# Patient Record
Sex: MALE | Race: ASIAN | Employment: OTHER | URBAN - METROPOLITAN AREA
[De-identification: names, ages, dates, MRNs, and addresses within clinical notes are randomized per-mention and may not be internally consistent; named-entity substitution may affect disease eponyms.]

---

## 2017-06-02 ENCOUNTER — OP HISTORICAL/CONVERTED ENCOUNTER (OUTPATIENT)
Dept: OTHER | Age: 52
End: 2017-06-02

## 2018-01-15 ENCOUNTER — OP HISTORICAL/CONVERTED ENCOUNTER (OUTPATIENT)
Dept: OTHER | Age: 53
End: 2018-01-15

## 2018-02-09 ENCOUNTER — OP HISTORICAL/CONVERTED ENCOUNTER (OUTPATIENT)
Dept: OTHER | Age: 53
End: 2018-02-09

## 2019-02-06 LAB — MICROALBUMIN UR TEST STR-MCNC: NORMAL MG/DL

## 2019-08-21 LAB — LDL-C, EXTERNAL: 104

## 2020-01-30 LAB
CREATININE, EXTERNAL: 0.98
HBA1C MFR BLD HPLC: 5.1 %

## 2020-02-10 ENCOUNTER — OP HISTORICAL/CONVERTED ENCOUNTER (OUTPATIENT)
Dept: OTHER | Age: 55
End: 2020-02-10

## 2020-05-27 ENCOUNTER — OFFICE VISIT (OUTPATIENT)
Dept: NEUROLOGY | Age: 55
End: 2020-05-27

## 2020-05-27 VITALS — TEMPERATURE: 99 F

## 2020-07-22 ENCOUNTER — CLINICAL SUPPORT (OUTPATIENT)
Dept: FAMILY MEDICINE CLINIC | Age: 55
End: 2020-07-22
Payer: MEDICARE

## 2020-07-22 ENCOUNTER — TELEPHONE (OUTPATIENT)
Dept: FAMILY MEDICINE CLINIC | Age: 55
End: 2020-07-22

## 2020-07-22 VITALS
WEIGHT: 147 LBS | DIASTOLIC BLOOD PRESSURE: 74 MMHG | OXYGEN SATURATION: 95 % | SYSTOLIC BLOOD PRESSURE: 121 MMHG | TEMPERATURE: 99.6 F | HEART RATE: 79 BPM

## 2020-07-22 DIAGNOSIS — F20.9 SCHIZOPHRENIA, UNSPECIFIED TYPE (HCC): Primary | ICD-10-CM

## 2020-07-22 PROCEDURE — 96372 THER/PROPH/DIAG INJ SC/IM: CPT | Performed by: FAMILY MEDICINE

## 2020-07-22 RX ORDER — HALOPERIDOL DECANOATE 100 MG/ML
100 INJECTION INTRAMUSCULAR
Qty: 1 ML | Refills: 0 | Status: SHIPPED | COMMUNITY
Start: 2020-07-22 | End: 2021-03-31 | Stop reason: SDUPTHER

## 2020-07-31 VITALS
WEIGHT: 149 LBS | TEMPERATURE: 98.9 F | BODY MASS INDEX: 24.83 KG/M2 | RESPIRATION RATE: 16 BRPM | OXYGEN SATURATION: 96 % | SYSTOLIC BLOOD PRESSURE: 117 MMHG | HEIGHT: 65 IN | HEART RATE: 85 BPM | DIASTOLIC BLOOD PRESSURE: 75 MMHG

## 2020-07-31 PROBLEM — R25.1 TREMOR: Status: ACTIVE | Noted: 2020-07-31

## 2020-07-31 PROBLEM — R41.3 MEMORY IMPAIRMENT: Status: ACTIVE | Noted: 2020-07-31

## 2020-07-31 PROBLEM — E11.9 TYPE 2 DIABETES MELLITUS WITHOUT COMPLICATION (HCC): Status: ACTIVE | Noted: 2020-07-31

## 2020-07-31 PROBLEM — F20.9 SCHIZOPHRENIA (HCC): Status: ACTIVE | Noted: 2020-07-31

## 2020-08-11 ENCOUNTER — OFFICE VISIT (OUTPATIENT)
Dept: NEUROLOGY | Age: 55
End: 2020-08-11
Payer: MEDICARE

## 2020-08-11 VITALS
HEART RATE: 72 BPM | DIASTOLIC BLOOD PRESSURE: 78 MMHG | WEIGHT: 145 LBS | SYSTOLIC BLOOD PRESSURE: 118 MMHG | BODY MASS INDEX: 22.76 KG/M2 | RESPIRATION RATE: 14 BRPM | TEMPERATURE: 98.2 F | HEIGHT: 67 IN | OXYGEN SATURATION: 98 %

## 2020-08-11 DIAGNOSIS — R25.9 ABNORMAL INVOLUNTARY MOVEMENT: Primary | ICD-10-CM

## 2020-08-11 DIAGNOSIS — R29.6 FALLS FREQUENTLY: ICD-10-CM

## 2020-08-11 DIAGNOSIS — R25.9 ABNORMAL INVOLUNTARY MOVEMENT: ICD-10-CM

## 2020-08-11 PROCEDURE — G8420 CALC BMI NORM PARAMETERS: HCPCS | Performed by: PSYCHIATRY & NEUROLOGY

## 2020-08-11 PROCEDURE — 95816 EEG AWAKE AND DROWSY: CPT | Performed by: PSYCHIATRY & NEUROLOGY

## 2020-08-11 PROCEDURE — 3017F COLORECTAL CA SCREEN DOC REV: CPT | Performed by: PSYCHIATRY & NEUROLOGY

## 2020-08-11 PROCEDURE — 99204 OFFICE O/P NEW MOD 45 MIN: CPT | Performed by: PSYCHIATRY & NEUROLOGY

## 2020-08-11 PROCEDURE — G8427 DOCREV CUR MEDS BY ELIG CLIN: HCPCS | Performed by: PSYCHIATRY & NEUROLOGY

## 2020-08-11 PROCEDURE — G8510 SCR DEP NEG, NO PLAN REQD: HCPCS | Performed by: PSYCHIATRY & NEUROLOGY

## 2020-08-11 RX ORDER — HALOPERIDOL 5 MG/1
1 TABLET ORAL DAILY
COMMUNITY
Start: 2020-05-27 | End: 2021-07-13 | Stop reason: ALTCHOICE

## 2020-08-11 RX ORDER — LITHIUM CARBONATE 300 MG
1 TABLET ORAL 2 TIMES DAILY
COMMUNITY
Start: 2020-05-27 | End: 2021-07-13 | Stop reason: SDUPTHER

## 2020-08-11 RX ORDER — BUPROPION HYDROCHLORIDE 150 MG/1
1 TABLET ORAL DAILY
COMMUNITY
Start: 2020-05-27 | End: 2021-09-10

## 2020-08-11 RX ORDER — TEMAZEPAM 30 MG/1
1 CAPSULE ORAL
COMMUNITY
Start: 2020-07-24 | End: 2021-09-10

## 2020-08-11 RX ORDER — BENZTROPINE MESYLATE 1 MG/1
1 TABLET ORAL 2 TIMES DAILY
Status: ON HOLD | COMMUNITY
Start: 2020-05-21 | End: 2021-09-10 | Stop reason: SDUPTHER

## 2020-08-11 NOTE — PROGRESS NOTES
Chief Complaint   Patient presents with    New Patient     abnl foot movement and body seems to lean to left. particularly noticable when walking     Fall     at least 1-2 per mo, no injuries     Brother here with pt today giving medical hx.

## 2020-08-11 NOTE — PROGRESS NOTES
[facility] and [address] at [facility] at [facility]1 [address], [address] [facility] [address] [facility] [address]  [phone] Office  [phone]           Referring: Johnnie Emerson DO      Chief Complaint   Patient presents with   Noreene Peat New Patient     abnl foot movement and body seems to lean to left. particularly noticable when walking     Fall     at least 1-2 per mo, no injuries     61-year-old right-handed gentleman who is accompanied by his brother today and his brother provides history for complaints of foot jerks and occasionally results in falling and his body seems to lean to the left. His brother says he noticed this  Sometime ago and is gotten a bit progressively worse. The patient will be walking and he will start having a jumping movement of his right foot and this will cause him to fall. He is fallen about 1-2 times per month. He also has started to lean to the left and that is gotten progressively worse. His brother thought maybe he was drinking too much orange juice or soda or it was sugar related so he started restricting the patient's diet in terms of the amount of sugar intake. That made no difference. He has not had any loss in consciousness. No recent illness. No fever. No chills. No complaints of chest pain or shortness of breath. Patient says that his brother was evaluated a couple years ago and was told there was no abnormality but it only sounds like he had an examination. It also sounds as though his symptoms have been progressive since that time. He has not had any change to his antipsychotic medications and psychiatric drugs and his brother notes he has been on the same medicine for about 30 years. He does not seem to drag a leg or an arm. He does not have his consciousness altered with these movements.   He had a head CT performed several years ago. He has not had any recent imaging. Review of the electronic medical records finds no documentation in our system was no imaging in our system. Past Medical History:   Diagnosis Date    Anxiety     Depression    He also carries psychiatric diagnoses of schizophrenia unknown type as well as bipolar    History reviewed. No pertinent surgical history. Current Outpatient Medications   Medication Sig Dispense Refill    haloperidol decanoate (HALDOL DECANOATE) 100 mg/mL injection 1 mL by IntraMUSCular route every twenty-eight (28) days. (Patient taking differently: 100 mg by IntraMUSCular route every fourteen (14) days. ) 1 mL 0    benztropine (COGENTIN) 1 mg tablet Take 1 mg by mouth two (2) times a day.  buPROPion XL (WELLBUTRIN XL) 150 mg tablet Take 1 Tab by mouth daily.  haloperidoL (HALDOL) 5 mg tablet Take 1 Tab by mouth daily.  lithium carbonate 300 mg tablet Take 1 Tab by mouth three (3) times daily.  temazepam (RESTORIL) 30 mg capsule Take 1 Cap by mouth nightly. He is also on lithium 300 mg 3 times daily    No Known Allergies    Social History     Tobacco Use    Smoking status: Former Smoker     Packs/day: 1.00     Last attempt to quit:      Years since quittin.6    Smokeless tobacco: Never Used   Substance Use Topics    Alcohol use: Not Currently    Drug use: Never       Family History   Problem Relation Age of Onset    Cancer Father         bone       Review of Systems  Pertinent positives and negatives as noted with remainder of comprehensive review negative      Examination  Visit Vitals  Temp 98.2 °F (36.8 °C)   Ht 5' 7\" (1.702 m)   Wt 65.8 kg (145 lb)   BMI 22.71 kg/m²     Pleasant, well appearing. Dress and grooming are appropriate. No scleral icterus is present. Oropharynx is clear. Supple neck without bruit appreciated. Heart regular. Pulses are symmetrical.  No edema in the lower extremities. He is awake and alert.   He has no spontaneous speech. He will answer some yes and no questions. He tells me the month is August.  He does not know the president. Follows commands in the for examination. He is hypophonic. He has intact cranial nerves II-XII. No nystagmus. He has no pronation or drift. He has a variable postural tremor but no rest tremor. He is with cogwheeling at the wrists bilaterally. Hand opening and closing is good. He resists fully in the upper and lower extremities in all muscle groups today testing. Reflexes are more brisk on the right than the left and he has nonsustained clonus at the right ankle. No ataxia. CT scan [facility] dated February 10, 2010 reviewed personally on disc is normal to my review    Impression/Plan  52-year-old gentleman with longstanding psychopathology with complaints of abnormal jerking movements in the lower extremity causing him to fall and what it actually sounds like is happening is if he steps he starts to have clonus and then that will throw him off balance. That at least is my interpretation from the story and getting from his brother. In addition he is leaning to the left and while he does attempt to straighten himself he continues to lean. He also has some parkinsonian features on his examination. The parkinsonian features are likely secondary to his neuroleptic use. This could cause some imbalance as well but certainly would not sway the reflexes. At this juncture we are going to start with an MRI of the brain Doppler EEG and an EMG of the lower extremities. If those are unrevealing I think we should start to investigate a spinal cord with a cervical and thoracic MRI. We will also let the EMG guide us in terms of anything peripheral but again he is hyperreflexic and this is going to be brain or cord. Return after his studies and if again they are unrevealing then he should have MRI of the cervical and thoracic spine.   We will also send CMP, lithium level, copper and ceruloplasmin. Essie Sal MD    This note was created using voice recognition software. Despite editing, there may be syntax errors. This note will not be viewable in [address]

## 2020-08-11 NOTE — LETTER
8/11/20 Patient: Crystal Jolley YOB: 1965 Date of Visit: 8/11/2020 Jai Malagon DO 
[address] [address] VIA In Basket Dear Jai Malagon DO, Thank you for referring Mr. Shirley Christopher to [facility] for evaluation. My notes for this consultation are attached. If you have questions, please do not hesitate to call me. I look forward to following your patient along with you. Sincerely, Lucas Mccormack MD

## 2020-08-11 NOTE — PATIENT INSTRUCTIONS
RESULT POLICY      If we have ordered testing for you, know that; \"NO NEWS IS GOOD NEWS! \"     It is our policy that we know longer call patients with results, nor do we  give test results over the phone. We schedule follow up appointments so that your results can be discussed in person. This allows you to address any questions you have regarding the results. If you choose to go to an imaging center outside of [facility], it is your responsibility to bring imaging report and disc to follow up appointment. If something of concern is revealed on your test, we will contact you to discuss the matter and if needed schedule a sooner follow up appointment. Additionally, results may be found by using the My Chart feature and one of our patient service representatives at the  can give you instructions on how to access this feature to utilize our electronic medical record system. Thank you for your understanding. [facility]   Statement to Patients  April 1, 2014      In an effort to ensure the large volume of patient prescription refills is processed in the most efficient and expeditious manner, we are asking our patients to assist us by calling your Pharmacy for all prescription refills, this will include also your  Mail Order Pharmacy. The pharmacy will contact our office electronically to continue the refill process. Please do not wait until the last minute to call your pharmacy. We need at least 48 hours (2days) to fill prescriptions. We also encourage you to call your pharmacy before going to  your prescription to make sure it is ready. With regard to controlled substance prescription refill requests (narcotic refills) that need to be picked up at our office, we ask your cooperation by providing us with at least 72 hours (3days) notice that you will need a refill.     We will not refill narcotic prescription refill requests after 4:00pm on any weekday, Monday through Thursday, or after 2:00pm on Fridays, or on the weekends. We encourage everyone to explore another way of getting your prescription refill request processed using [employer], our patient web portal through our electronic medical record system. [employer] is an efficient and effective way to communicate your medication request directly to the office and  downloadable as an lor on your smart phone . [employer] also features a review functionality that allows you to view your medication list as well as leave messages for your physician. Are you ready to get connected? If so please review the attatched instructions or speak to any of our staff to get you set up right away! Thank you so much for your cooperation. Should you have any questions please contact our Practice Administrator.

## 2020-08-13 LAB
ALBUMIN SERPL-MCNC: 4.3 G/DL (ref 3.8–4.9)
ALBUMIN/GLOB SERPL: 2 {RATIO} (ref 1.2–2.2)
ALP SERPL-CCNC: 68 IU/L (ref 39–117)
ALT SERPL-CCNC: 17 IU/L (ref 0–44)
AST SERPL-CCNC: 16 IU/L (ref 0–40)
BILIRUB SERPL-MCNC: 0.3 MG/DL (ref 0–1.2)
BUN SERPL-MCNC: 15 MG/DL (ref 6–24)
BUN/CREAT SERPL: 13 (ref 9–20)
CALCIUM SERPL-MCNC: 9.3 MG/DL (ref 8.7–10.2)
CERULOPLASMIN SERPL-MCNC: 23.6 MG/DL (ref 16–31)
CHLORIDE SERPL-SCNC: 105 MMOL/L (ref 96–106)
CO2 SERPL-SCNC: 24 MMOL/L (ref 20–29)
COPPER SERPL-MCNC: 99 UG/DL (ref 72–166)
CREAT SERPL-MCNC: 1.16 MG/DL (ref 0.76–1.27)
GLOBULIN SER CALC-MCNC: 2.1 G/DL (ref 1.5–4.5)
GLUCOSE SERPL-MCNC: 82 MG/DL (ref 65–99)
LITHIUM SERPL-SCNC: 1.1 MMOL/L (ref 0.6–1.2)
POTASSIUM SERPL-SCNC: 3.9 MMOL/L (ref 3.5–5.2)
PROT SERPL-MCNC: 6.4 G/DL (ref 6–8.5)
SODIUM SERPL-SCNC: 142 MMOL/L (ref 134–144)

## 2020-08-20 ENCOUNTER — OFFICE VISIT (OUTPATIENT)
Dept: FAMILY MEDICINE CLINIC | Age: 55
End: 2020-08-20

## 2020-08-20 ENCOUNTER — OFFICE VISIT (OUTPATIENT)
Dept: NEUROLOGY | Age: 55
End: 2020-08-20

## 2020-08-20 VITALS
DIASTOLIC BLOOD PRESSURE: 74 MMHG | OXYGEN SATURATION: 97 % | SYSTOLIC BLOOD PRESSURE: 112 MMHG | BODY MASS INDEX: 22.96 KG/M2 | HEART RATE: 80 BPM | RESPIRATION RATE: 18 BRPM | WEIGHT: 146.6 LBS | TEMPERATURE: 99 F

## 2020-08-20 DIAGNOSIS — R25.9 ABNORMAL INVOLUNTARY MOVEMENT: Primary | ICD-10-CM

## 2020-08-20 DIAGNOSIS — F20.9 SCHIZOPHRENIA, UNSPECIFIED TYPE (HCC): Primary | ICD-10-CM

## 2020-08-20 DIAGNOSIS — R53.1 WEAKNESS: ICD-10-CM

## 2020-08-20 NOTE — PROCEDURES
EMG/ NCS Report  [facility] - INPATIENT  [address] [facility], [address] [place], [address]   Ph: [phone]   FAX: [phone]  Test Date:  2020    Patient: QUANG NARVAEZ : 1965 Physician: Reilly Ventura MD   Sex: Male Height: ' \" Ref Phys: Melia Landau, MD   ID#: [id number] Weight:  lbs. Technician: Sadia Nicholas     Patient History / Exam:  Patient is coming for weakness and abnormal movement of both lower extremity when he is walking (-) numbness (-) lower back pain . Patient is coming for neuropathy evaluation. EMG & NCV Findings:  Evaluation of the left Fibular motor and the right Fibular motor nerves showed normal distal onset latency (L4.9, R4.6 ms), reduced amplitude (L1.5, R1.2 mV), decreased conduction velocity (B Fib-Ankle, L33, R34 m/s), and decreased conduction velocity (Poplt-B Fib, L34, R37 m/s). The left Fibular TA motor and the right Fibular TA motor nerves showed normal distal onset latency (L3.7, R3.1 ms), normal amplitude (L4.7, R6.3 mV), and normal conduction velocity (Poplit-Fib Head, L53, R45 m/s). The right median motor nerve showed normal distal onset latency (3.8 ms), normal amplitude (8.5 mV), and normal conduction velocity (Elbow-Wrist, 50 m/s). The left tibial motor nerve showed normal distal onset latency (5.2 ms), reduced amplitude (4.7 mV), and decreased conduction velocity (Knee-Ankle, 35 m/s). The right tibial motor nerve showed normal distal onset latency (5.9 ms), normal amplitude (9.3 mV), and decreased conduction velocity (Knee-Ankle, 35 m/s). The right ulnar motor nerve showed normal distal onset latency (3.0 ms), normal amplitude (8.5 mV), normal conduction velocity (B Elbow-Wrist, 50 m/s), and normal conduction velocity (A Elbow-B Elbow, 50 m/s).   The right median sensory, the right radial sensory, and the right ulnar sensory nerves showed normal distal peak latency (R3.6, R2.6, R3.4 ms) and normal amplitude (R17.0, R16.7, R11.1 µV). The left Sup Fibular sensory and the right Sup Fibular sensory nerves showed no response (Lower leg). The left sural sensory nerve showed no response (Calf) and no response (Site 2). The right sural sensory nerve showed no response (Calf). F Wave studies indicate that the left tibial F wave has prolonged latency (62.97 ms). The right tibial F wave has prolonged latency (65.80 ms). All F Wave left vs. right side latency differences were within normal limits. All H Reflex left vs. right side latency differences were within normal limits. Needle evaluation of the right extensor digitorum brevis and the left abductor hallucis muscles showed diminished recruitment. The right abductor hallucis, the right anterior tibialis, the left anterior tibialis, the left vastus lateralis, and the left gluteus medius muscles showed recruitment. The left extensor digitorum brevis muscle showed slightly increased spontaneous activity, recruitment, Incr Duration, and increased motor unit amplitude. All remaining muscles (as indicated in the following table) showed no evidence of electrical instability. 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         John Zurita MD  Diplomate, American Board of Psychiatry and Neurology  Diplomate, Neuromuscular Medicine  Diplomate, American Board of Electrodiagnostic Medicine  Director, [address] [place] [address] Accredited Laboratory with Exemplary Status          Nerve Conduction Studies  Anti Sensory Summary Table     Stim Site NR Peak (ms) Norm Peak (ms) P-T Amp (µV) Norm P-T Amp Site1 Site2 Dist (cm)   Right Median Anti Sensory (2nd Digit)  30.3°C   Wrist    3.6 <4 17.0 >13 Wrist 2nd Digit 14.0   Right Radial Anti Sensory (Base 1st Digit)  31.9°C   Wrist    2.6 <2.8 16.7 >11 Wrist Base 1st Digit 10.0   Left Sup Fibular Anti Sensory (Lat ankle)  30.2°C   Lower leg NR  <4.5  >5 Lower leg Lat ankle 10.0   Right Sup Fibular Anti Sensory (Lat ankle)  31.1°C   Lower leg NR  <4.5  >5 Lower leg Lat ankle 10.0   Left Sural Anti Sensory (Lat Mall)  30.5°C   Calf NR  <4.5  >4.0 Calf Lat Mall 14.0   Site 2 NR          Right Sural Anti Sensory (Lat Mall)  31.1°C   Calf NR  <4.5  >4.0 Calf Lat Mall 14.0   Right Ulnar Anti Sensory (5th Digit)  31.7°C   Wrist    3.4 <4.0 11.1 >9 Wrist 5th Digit 14.0     Motor Summary Table     Stim Site NR Onset (ms) Norm Onset (ms) O-P Amp (mV) Norm O-P Amp Amp (Prev) (%) Site1 Site2 Dist (cm) Jesús (m/s) Norm Jesús (m/s)   Left Fibular Motor (Ext Dig Brev)  33.7°C   Ankle    4.9 <6.5 1.5 >2.6 100.0 Ankle Ext Dig Brev 8.0     B Fib    14.1  1.5  100.0 B Fib Ankle 30.0 33 >38   Poplt    17.0  1.3  86.7 Poplt B Fib 10.0 34 >42   Right Fibular Motor (Ext Dig Brev)  29.7°C   Ankle    4.6 <6.5 1.2 >2.6 100.0 Ankle Ext Dig Brev 8.0     B Fib    13.5  1.2  100.0 B Fib Ankle 30.0 34 >38   Poplt    16.2  1.1  91.7 Poplt B Fib 10.0 37 >42   Left Fibular TA Motor (Tib Ant)  30.2°C   Fib Head    3.7 <4.0 4.7 >4.0 100.0 Fib Head Tib Ant 10.0     Poplit    5.6  4.8  102.1 Poplit Fib Head 10.0 53 >40   Right Fibular TA Motor (Tib Ant)  31°C   Fib Head    3.1 <4.0 6.3 >4.0 100.0 Fib Head Tib Ant 10.0     Poplit    5.3  6.3  100.0 Poplit Fib Head 10.0 45 >40   Right Median Motor (Abd Poll Brev)  32.1°C   Wrist    3.8 <4.5 8.5 >4.1 100.0 Wrist Abd Poll Brev 8.0     Elbow    8.4  8.3  97.6 Elbow Wrist 23.0 50 >49   Left Tibial Motor (Abd Jaimes Brev)  30.2°C   Ankle    5.2 <6.1 4.7 >5.3 100.0 Ankle Abd Jaimes Brev 8.0     Knee    15.0  3.2  68.1 Knee Ankle 34.0 35 >39   Right Tibial Motor (Abd Jaimes Brev)  30.1°C   Ankle    5.9 <6.1 9.3 >5.3 100.0 Ankle Abd Jaimes Brev 8.0     Knee    16.7  4.9  52.7 Knee Ankle 38.0 35 >39   Right Ulnar Motor (Abd Dig Minimi)  31.1°C   Wrist    3.0 <3.1 8.5 >7.0 100.0 Wrist Abd Dig Minimi 8.0  >50   B Elbow    6.8  8.0  94.1 B Elbow Wrist 19.0 50 >50   A Elbow    8.8  7.6  95.0 A Elbow B Elbow 10.0 50 >50     F Wave Studies     NR F-Lat (ms) Lat Norm (ms) L-R F-Lat (ms) L-R Lat Norm   Left Tibial (Mrkrs) (Abd Hallucis)  30.2°C      62.97 <56 2.83 <5.7   Right Tibial (Mrkrs) (Abd Hallucis)  31°C      65.80 <56 2.83 <5.7     H Reflex Studies     NR H-Lat (ms) L-R H-Lat (ms) L-R Lat Norm   Left Tibial (Gastroc)  30.2°C      37.11 1.44 <2.0   Right Tibial (Gastroc)  31°C      35.67 1.44 <2.0     EMG     Side Muscle Nerve Root Ins Act Fibs Psw Recrt Duration Amp Poly Comment   Right Ext Dig Brev Dp Br Peron L5, S1 Nml Nml Nml Reduced Nml Nml Nml    Right AbdHallucis MedPlantar S1-2 Nml Nml Nml Mod Nml Nml Nml    Right AntTibialis Dp Br Peron L4-5 Nml Nml Nml Mod Nml Nml Nml    Right MedGastroc Tibial S1-2 Nml Nml Nml Nml Nml Nml Nml    Right VastusLat Femoral L2-4 Nml Nml Nml Nml Nml Nml Nml    Left Ext Dig Brev Dp Br Peron L5, S1 Nml 1+ 1+ SM[facility] Incr Incr Nml    Left AbdHallucis MedPlantar S1-2 Nml Nml Nml Reduced Nml Nml Nml    Left AntTibialis Dp Br Peron L4-5 Nml Nml Nml Mod Nml Nml Nml    Left MedGastroc Tibial S1-2 Nml Nml Nml Nml Nml Nml Nml    Left VastusLat Femoral L2-4 Nml Nml Nml Mod Nml Nml Nml    Left GluteusMed SupGluteal L4-S1 Nml Nml Nml Mod Nml Nml Nml    Left Lower Lumb Parasp Rami L5,S1 Nml Nml Nml Nml Nml Nml Nml                Nerve Conduction Studies  Anti Sensory Left/Right Comparison     Stim Site L Lat (ms) R Lat (ms) L-R Lat (ms) L Amp (µV) R Amp (µV) L-R Amp (%) Site1 Site2 L Jesús (m/s) R Jesús (m/s) L-R Jesús (m/s)   Median Anti Sensory (2nd Digit)  30.3°C   Wrist  2.7   17.0  Wrist 2nd Digit  52    Radial Anti Sensory (Base 1st Digit)  31.9°C   Wrist  2.0   16.7  Wrist Base 1st Digit  50    Sup Fibular Anti Sensory (Lat ankle)  30.2°C   Lower leg       Lower leg Lat ankle      Sural Anti Sensory (Lat Mall)  30.5°C   Calf       Calf Lat Mall      Site 2              Ulnar Anti Sensory (5th Digit)  31.7°C   Wrist  2.4   11.1  Wrist 5th Digit  58      Motor Left/Right Comparison     Stim Site L Lat (ms) R Lat (ms) L-R Lat (ms) L Amp (mV) R Amp (mV) L-R Amp (%) Site1 Site2 L Jesús (m/s) R Jesús (m/s) L-R Jesús (m/s)   Fibular Motor (Ext Dig Brev)  33.7°C   Ankle 4.9 4.6 0.3 1.5 1.2 20.0 Ankle Ext Dig Brev      B Fib 14.1 13.5 0.6 1.5 1.2 20.0 B Fib Ankle 33 34 1   Poplt 17.0 16.2 0.8 1.3 1.1 15.4 Poplt B Fib 34 37 3   Fibular TA Motor (Tib Ant)  30.2°C   Fib Head 3.7 3.1 0.6 4.7 6.3 25.4 Fib Head Tib Ant      Poplit 5.6 5.3 0.3 4.8 6.3 23.8 Poplit Fib Head 53 45 8   Median Motor (Abd Poll Brev)  32.1°C   Wrist  3.8   8.5  Wrist Abd Poll Brev      Elbow  8.4   8.3  Elbow Wrist  50    Tibial Motor (Abd Jaimes Brev)  30.2°C   Ankle 5.2 5.9 0.7 4.7 9.3 49.5 Ankle Abd Jaimes Brev      Knee 15.0 16.7 1.7 3.2 4.9 34.7 Knee Ankle 35 35 0   Ulnar Motor (Abd Dig Minimi)  31.1°C   Wrist  3.0   8.5  Wrist Abd Dig Minimi      B Elbow  6.8   8.0  B Elbow Wrist  50    A Elbow  8.8   7.6  A Elbow B Elbow  50          Waveforms:

## 2020-08-21 NOTE — PROCEDURES
EEG:      Date:  08/20/20    Requesting Physician:  Cem Oden. MD Juliane    An EEG is requested in this 66-year-old with involuntary movements and confusion to evaluate for epileptiform abnormality. Medications:  Medications are listed as Cogentin, Wellbutrin, Haldol, Lithium and Restoril. This tracing is obtained during the awake state. During this state there is no definitive alpha rhythm. Instead, the background consists of diffuse alpha range frequencies. The tracing is reactive. Hyperventilation is not performed secondary to COVID-19 precautions. Intermittent photic stimulation little alters the tracing. Sleep is not attained. Interpretation: This EEG recorded during the awake state is abnormal secondary to mild disorganization of the background rhythms, indicative of a mild degree of bihemispheric dysfunction as is commonly seen with an encephalopathy, which may have contributions from toxic, metabolic, diffuse structural and/or pharmacologic effects, and clinical correlation is recommended. This pattern is also seen in chronic neurodegenerative disorders and clinical correlation is recommended.

## 2020-08-24 ENCOUNTER — HOSPITAL ENCOUNTER (OUTPATIENT)
Dept: MRI IMAGING | Age: 55
Discharge: HOME | End: 2020-08-24
Attending: PSYCHIATRY & NEUROLOGY
Payer: MEDICARE

## 2020-08-24 PROCEDURE — 74011250636 HC RX REV CODE- 250/636: Performed by: PSYCHIATRY & NEUROLOGY

## 2020-08-24 PROCEDURE — 70553 MRI BRAIN STEM W/O & W/DYE: CPT

## 2020-08-24 PROCEDURE — A9575 INJ GADOTERATE MEGLUMI 0.1ML: HCPCS | Performed by: PSYCHIATRY & NEUROLOGY

## 2020-08-24 RX ORDER — GADOTERATE MEGLUMINE 376.9 MG/ML
15 INJECTION INTRAVENOUS
Status: COMPLETED | OUTPATIENT
Start: 2020-08-24 | End: 2020-08-24

## 2020-08-24 RX ADMIN — GADOTERATE MEGLUMINE 15 ML: 376.9 INJECTION INTRAVENOUS at 11:41

## 2020-09-14 RX ORDER — HALOPERIDOL DECANOATE 100 MG/ML
100 INJECTION INTRAMUSCULAR
Status: DISCONTINUED | OUTPATIENT
Start: 2020-09-14 | End: 2021-09-10

## 2020-09-16 ENCOUNTER — CLINICAL SUPPORT (OUTPATIENT)
Dept: FAMILY MEDICINE CLINIC | Age: 55
End: 2020-09-16

## 2020-09-16 VITALS
WEIGHT: 150 LBS | SYSTOLIC BLOOD PRESSURE: 127 MMHG | OXYGEN SATURATION: 96 % | HEART RATE: 77 BPM | BODY MASS INDEX: 23.49 KG/M2 | RESPIRATION RATE: 18 BRPM | DIASTOLIC BLOOD PRESSURE: 77 MMHG

## 2020-09-16 DIAGNOSIS — F20.9 SCHIZOPHRENIA, UNSPECIFIED TYPE (HCC): Primary | ICD-10-CM

## 2020-09-21 ENCOUNTER — OFFICE VISIT (OUTPATIENT)
Dept: NEUROLOGY | Age: 55
End: 2020-09-21
Payer: MEDICARE

## 2020-09-21 VITALS
DIASTOLIC BLOOD PRESSURE: 76 MMHG | OXYGEN SATURATION: 99 % | HEIGHT: 67 IN | RESPIRATION RATE: 18 BRPM | SYSTOLIC BLOOD PRESSURE: 125 MMHG | WEIGHT: 148 LBS | HEART RATE: 51 BPM | TEMPERATURE: 97 F | BODY MASS INDEX: 23.23 KG/M2

## 2020-09-21 DIAGNOSIS — R26.9 ABNORMALITY OF GAIT: Primary | ICD-10-CM

## 2020-09-21 DIAGNOSIS — G60.9 IDIOPATHIC PERIPHERAL NEUROPATHY: ICD-10-CM

## 2020-09-21 PROCEDURE — G8420 CALC BMI NORM PARAMETERS: HCPCS | Performed by: NURSE PRACTITIONER

## 2020-09-21 PROCEDURE — G8432 DEP SCR NOT DOC, RNG: HCPCS | Performed by: NURSE PRACTITIONER

## 2020-09-21 PROCEDURE — 3017F COLORECTAL CA SCREEN DOC REV: CPT | Performed by: NURSE PRACTITIONER

## 2020-09-21 PROCEDURE — 99214 OFFICE O/P EST MOD 30 MIN: CPT | Performed by: NURSE PRACTITIONER

## 2020-09-21 PROCEDURE — G8427 DOCREV CUR MEDS BY ELIG CLIN: HCPCS | Performed by: NURSE PRACTITIONER

## 2020-09-21 NOTE — PROGRESS NOTES
[address] [facility],[address]  [address]   [address] [place] [address] [place] [address] [address]   [phone] Office   [phone] Fax           Date:  20     Name:  QUANG NARVAEZ  :  1965  MRN:  [id number]     PCP:  Mehdi Banda DO    Chief Complaint   Patient presents with    Results       HISTORY OF PRESENT ILLNESS: Follow up for falls, abnormal movement, and gait instability. He was seen initially by Dr. Little Quevedo. At that visit, he was reported to have abnormal jerking movement in the lower extremity which is causing him to fall. He was leaning off to the left a lot as well. These issues are unchanged at this point. For further evaluation, he was sent for a carotid Doppler, EEG, EMG of the lower extremities, MRI of the brain, and laboratory studies. The vast majority of this was normal or at least normal for his present medication use as the abnormalities noted on EEG could be secondary to his use of lithium and Haldol. On EMG, he does appear to have evidence of neuropathy. Recap from initial visit with Dr. Little Quevedo,  210:  78-year-old gentleman with longstanding psychopathology with complaints of abnormal jerking movements in the lower extremity causing him to fall and what it actually sounds like is happening is if he steps he starts to have clonus and then that will throw him off balance. That at least is my interpretation from the story and getting from his brother. In addition he is leaning to the left and while he does attempt to straighten himself he continues to lean. He also has some parkinsonian features on his examination. The parkinsonian features are likely secondary to his neuroleptic use. This could cause some imbalance as well but certainly would not sway the reflexes. At this juncture we are going to start with an MRI of the brain Doppler EEG and an EMG of the lower extremities.   If those are unrevealing I think we should start to investigate a spinal cord with a cervical and thoracic MRI. We will also let the EMG guide us in terms of anything peripheral but again he is hyperreflexic and this is going to be brain or cord. Return after his studies and if again they are unrevealing then he should have MRI of the cervical and thoracic spine. We will also send CMP, lithium level, copper and ceruloplasmin. BRAIN MRI WITH AND WITHOUT CONTRAST: 8/24/2020 11:41 AM     INDICATION: Abnormal involuntary movements, falls, leans left.     COMPARISON: None.     TECHNIQUE: Images were obtained before and after IV contrast in multiple planes  and sequences to emphasize T1, T2, and T2* information. Diffusion-weighted  images and ADC maps were also obtained. 15 cc IV Dotarem was administered.     FINDINGS: Scattered white matter signal abnormalities are predominantly  subcortical. They are concentrated in the frontal and parietal lobes, with a few  foci scattered in the temporal and occipital lobes, and left cerebellum. This  likely represents chronic small vessel ischemic disease. Demyelinating disease  is possible. No radiating, pericallosal, white matter signal abnormalities to  definitely suggest multiple sclerosis. No enhancement to suggest active  demyelinating disease.     The ventricles and sulci are appropriate for age. The main intracranial  flow-voids are normal. No restricted diffusion to suggest acute infarction. No  hemorrhage. Maxillary and ethmoid paranasal sinus mucosal disease is mild. IMPRESSION:   1. Scattered subcortical white matter signal abnormalities. Chronic small vessel  ischemic disease favored over demyelinating disease. 2. No radiating, pericallosal, white matter lesions to definitely suggest  demyelinating disease. No enhancement to suggest active demyelinating disease.     Carotid Doppler Interpretation Summary  1-15% bilateral ICA      Cerebrovascular Findings   Right Carotid   The right CCA is patent. There is minimal stenosis in the right ICA. There is intimal thickening present in the right ICA. The right ICA has heterogeneous plaque. The right ECA is patent. The right vertebral is antegrade. Left Carotid   The left CCA is patent. There is minimal stenosis in the left ICA. There is intimal thickening present in the left ICA. The left ECA is patent. The left vertebral is antegrade. 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 EEG Interpretation: This EEG recorded during the awake state is abnormal secondary to mild disorganization of the background rhythms, indicative of a mild degree of bihemispheric dysfunction as is commonly seen with an encephalopathy, which may have contributions from toxic, metabolic, diffuse structural and/or pharmacologic effects, and clinical correlation is recommended. This pattern is also seen in chronic neurodegenerative disorders and clinical correlation is recommended.     LITHIUM   Order: [id number]   Collected:  2020 13:34   View Full Report   Ref Range & Units  1mo ago   Lithium level  0.6 - 1.2 mmol/L  1.1     Comment:                                  Detection Limit = 0.1                             <0.1 indicates None Detected       Narrative     Performed at: [address]  [id number]   : Krystyna Pete MD, Phone:  [phone] COPPER   Order: [id number]   Collected:  8/11/2020 13:34   View Full Report   Ref Range & Units  1mo ago   Copper, serum  72 - 166 ug/dL  99     Comment:                                 Detection Limit = 5       Narrative     Test(s) 179218-Cujkqj, Serum   was developed and its performance characteristics determined   by [employer] [employer]. It has not been cleared or approved by the Food   and Drug Administration. Performed at: [address]  [id number]   : Alissa Avilez MD, Phone:  [phone]             CERULOPLASMIN   Order: [id number]   Collected:  8/11/2020 13:34   View Full Report   Ref Range & Units  1mo ago   Ceruloplasmin  16.0 - 31.0 mg/dL  23.6        Narrative     Performed at: [address]  [id number]   : Alissa Avilez MD, Phone:  [phone]             METABOLIC PANEL, COMPREHENSIVE   Order: [id number]   Collected:  8/11/2020 13:34   View Full Report   Ref Range & Units  1mo ago   Glucose  65 - 99 mg/dL  82     BUN  6 - 24 mg/dL  15     Creatinine  0.76 - 1.27 mg/dL  1.16     GFR est non-AA  >59 mL/min/1.73  70     GFR est AA  >59 mL/min/1.73  81     BUN/Creatinine ratio  9 - 20  13     Sodium  134 - 144 mmol/L  142     Potassium  3.5 - 5.2 mmol/L  3.9     Chloride  96 - 106 mmol/L  105     CO2  20 - 29 mmol/L  24     Calcium  8.7 - 10.2 mg/dL  9.3     Protein, total  6.0 - 8.5 g/dL  6.4     Albumin  3.8 - 4.9 g/dL  4.3     GLOBULIN, TOTAL  1.5 - 4.5 g/dL  2.1     A-G Ratio  1.2 - 2.2  2.0     Bilirubin, total  0.0 - 1.2 mg/dL  0.3     Alk. phosphatase  39 - 117 IU/L  68     AST (SGOT)  0 - 40 IU/L  16     ALT (SGPT)  0 - 44 IU/L  17        Narrative     Performed at: [address]  [id number]   : Alissa Avilez MD, Phone:  [phone]              Except as noted above, denies  fever, chills, cough. No CP or SOB.   No dysuria, loss of bowel or bladder control. No Weight loss. Appetite good. Sleeping well. No sweats. No edema. No bruising or bleeding. No nausea or vomit. No diarrhea. No frequency, urgency, No depressive sxs. No anxiety. Denies sore throat, nasal congestion, nasal discharge, epistaxis, tinnitus, hearing loss, back pain, muscle pain, or joint pain. Current Outpatient Medications   Medication Sig    benztropine (COGENTIN) 1 mg tablet Take 1 mg by mouth two (2) times a day.  buPROPion XL (WELLBUTRIN XL) 150 mg tablet Take 1 Tab by mouth daily.  haloperidoL (HALDOL) 5 mg tablet Take 1 Tab by mouth daily.  lithium carbonate 300 mg tablet Take 1 Tab by mouth three (3) times daily.  temazepam (RESTORIL) 30 mg capsule Take 1 Cap by mouth nightly.  haloperidol decanoate (HALDOL DECANOATE) 100 mg/mL injection 1 mL by IntraMUSCular route every twenty-eight (28) days. (Patient taking differently: 100 mg by IntraMUSCular route every fourteen (14) days.)     Current Facility-Administered Medications   Medication Dose Route Frequency    haloperidol decanoate (HALDOL DECANOATE) 100 mg/mL LA injection 100 mg  100 mg IntraMUSCular Q28D     No Known Allergies  Past Medical History:   Diagnosis Date    Anxiety     Depression     Schizoaffective disorder, bipolar type ([facility] [address]      History reviewed. No pertinent surgical history.   Social History     Socioeconomic History    Marital status: SINGLE     Spouse name: Not on file    Number of children: Not on file    Years of education: Not on file    Highest education level: Not on file   Occupational History    Not on file   Social Needs    Financial resource strain: Not on file    Food insecurity     Worry: Not on file     Inability: Not on file    Transportation needs     Medical: Not on file     Non-medical: Not on file   Tobacco Use    Smoking status: Former Smoker     Packs/day: 1.00     Last attempt to quit:      Years since quittin.7    Smokeless tobacco: Never Used   Substance and Sexual Activity    Alcohol use: Not Currently    Drug use: Never    Sexual activity: Not on file   Lifestyle    Physical activity     Days per week: Not on file     Minutes per session: Not on file    Stress: Not on file   Relationships    Social connections     Talks on phone: Not on file     Gets together: Not on file     Attends Latter day service: Not on file     Active member of club or organization: Not on file     Attends meetings of clubs or organizations: Not on file     Relationship status: Not on file    Intimate partner violence     Fear of current or ex partner: Not on file     Emotionally abused: Not on file     Physically abused: Not on file     Forced sexual activity: Not on file   Other Topics Concern    Not on file   Social History Narrative    Not on file     Family History   Problem Relation Age of Onset    Cancer Father         bone    Pancreatic Cancer Father     Dementia Mother        PHYSICAL EXAMINATION:    Visit Vitals  /76   Pulse (!) 51   Temp 97 °F (36.1 °C)   Resp 18   Ht 5' 7\" (1.702 m)   Wt 67.1 kg (148 lb)   SpO2 99%   BMI 23.18 kg/m²     General:  Well defined, nourished, and groomed individual in no acute distress. Neck: Supple, nontender, no bruits, no pain with resistance to active range of motion. Heart: Regular rate and rhythm, no murmurs, rub, or gallop. Normal S1S2. Lungs:  Clear to auscultation bilaterally with equal chest expansion, no cough, no wheeze  Musculoskeletal:  Extremities revealed no edema and had full range of motion of joints. Psych:  Good mood and bright affect    NEUROLOGICAL EXAMINATION:     Mental Status:   Alert and oriented to person, place, and time with recent and remote memory intact. Attention span and concentration are normal. Speech is fluent with a full fund of knowledge.       Cranial Nerves:  I: smell Not tested   II: visual fields Full to confrontation   II: pupils Equal, round, reactive to light   II: optic disc No papilledema   III,VII: ptosis none   III,IV,VI: extraocular muscles  Full ROM   V: mastication normal   V: facial light touch sensation  normal   VII: facial muscle function   symmetric   VIII: hearing symmetric   IX: soft palate elevation  normal   XI: trapezius strength  5/5   XI: sternocleidomastoid strength 5/5   XI: neck flexion strength  5/5   XII: tongue  midline     Motor Examination: Normal tone, bulk, and strength, 5/5 muscle strength throughout. No cogwheel rigidity or clonus present. Coordination:  Finger to nose and rapid arm movement testing was normal with only a trace intention tremor which is likely secondary to his neuroleptic use. Gait and Station:  Steady while walking without a bilateral arm swing. In looking at his posture, his left shoulder does appear lower both with sitting and standing. No pronator drift. No muscle wasting or fasiculations noted. Reflexes:  DTRs 2+ throughout. ASSESSMENT AND PLAN    ICD-10-CM ICD-9-CM    1. Abnormality of gait  R26.9 781.2 MRI CERV SPINE WO CONT      MRI [facility] SPINE WO CONT   2. Idiopathic peripheral neuropathy  G60.9 356.9 TSH 3RD GENERATION      T4, FREE      VITAMIN B12     Ongoing abnormality of gait with abnormal movements and falls. The only abnormal test thus far is EMG testing which demonstrates an idiopathic peripheral neuropathy. While this certainly could cause him to have some sensory ataxia, it certainly would not cause a change in his posture. I question whether or not he may actually have a decrease in curvature of the spine such as what would be seen in scoliosis. To further evaluate for underlying causes of the neuropathy, he will be sent for thyroid function testing and B12 level. Random blood glucose level was only 82 so I think underlying diabetic issues are unlikely.   To ensure there is no cord abnormality or stenosis, he will be sent for MRI of the cervical and thoracic spine. Certainly his use of neuroleptic medication, specifically the lithium could cause some gait abnormality. They will discuss use of lithium with their psychiatrist.    Andrew Lui.  France Harris

## 2020-09-30 ENCOUNTER — OFFICE VISIT (OUTPATIENT)
Dept: FAMILY MEDICINE CLINIC | Age: 55
End: 2020-09-30

## 2020-09-30 VITALS
HEART RATE: 85 BPM | DIASTOLIC BLOOD PRESSURE: 74 MMHG | OXYGEN SATURATION: 95 % | BODY MASS INDEX: 23.23 KG/M2 | SYSTOLIC BLOOD PRESSURE: 117 MMHG | WEIGHT: 148 LBS | TEMPERATURE: 96.6 F | HEIGHT: 67 IN

## 2020-09-30 DIAGNOSIS — F20.9 SCHIZOPHRENIA, UNSPECIFIED TYPE (HCC): Primary | ICD-10-CM

## 2020-09-30 RX ORDER — HALOPERIDOL DECANOATE 100 MG/ML
100 INJECTION INTRAMUSCULAR
Status: CANCELLED | OUTPATIENT
Start: 2020-09-30

## 2020-10-02 ENCOUNTER — HOSPITAL ENCOUNTER (OUTPATIENT)
Dept: MRI IMAGING | Age: 55
Discharge: HOME | End: 2020-10-02
Attending: NURSE PRACTITIONER
Payer: MEDICARE

## 2020-10-02 VITALS — WEIGHT: 145 LBS | HEIGHT: 65 IN | BODY MASS INDEX: 24.16 KG/M2

## 2020-10-02 DIAGNOSIS — R26.9 ABNORMALITY OF GAIT: ICD-10-CM

## 2020-10-02 PROCEDURE — 72141 MRI NECK SPINE W/O DYE: CPT

## 2020-10-02 PROCEDURE — 72146 MRI CHEST SPINE W/O DYE: CPT

## 2020-10-06 LAB
T4 FREE SERPL-MCNC: 1.21 NG/DL (ref 0.82–1.77)
TSH SERPL DL<=0.005 MIU/L-ACNC: 2.21 UIU/ML (ref 0.45–4.5)
VIT B12 SERPL-MCNC: 314 PG/ML (ref 232–1245)

## 2020-10-20 ENCOUNTER — OFFICE VISIT (OUTPATIENT)
Dept: NEUROLOGY | Age: 55
End: 2020-10-20
Payer: MEDICARE

## 2020-10-20 VITALS
HEIGHT: 65 IN | HEART RATE: 70 BPM | OXYGEN SATURATION: 98 % | SYSTOLIC BLOOD PRESSURE: 100 MMHG | TEMPERATURE: 98.1 F | RESPIRATION RATE: 16 BRPM | WEIGHT: 148 LBS | BODY MASS INDEX: 24.66 KG/M2 | DIASTOLIC BLOOD PRESSURE: 70 MMHG

## 2020-10-20 DIAGNOSIS — G60.9 IDIOPATHIC PERIPHERAL NEUROPATHY: ICD-10-CM

## 2020-10-20 DIAGNOSIS — R26.9 ABNORMALITY OF GAIT: Primary | ICD-10-CM

## 2020-10-20 DIAGNOSIS — R25.9 ABNORMAL INVOLUNTARY MOVEMENT: ICD-10-CM

## 2020-10-20 PROCEDURE — 3017F COLORECTAL CA SCREEN DOC REV: CPT | Performed by: NURSE PRACTITIONER

## 2020-10-20 PROCEDURE — 99214 OFFICE O/P EST MOD 30 MIN: CPT | Performed by: NURSE PRACTITIONER

## 2020-10-20 PROCEDURE — G8420 CALC BMI NORM PARAMETERS: HCPCS | Performed by: NURSE PRACTITIONER

## 2020-10-20 PROCEDURE — G8432 DEP SCR NOT DOC, RNG: HCPCS | Performed by: NURSE PRACTITIONER

## 2020-10-20 PROCEDURE — G8427 DOCREV CUR MEDS BY ELIG CLIN: HCPCS | Performed by: NURSE PRACTITIONER

## 2020-10-20 NOTE — PROGRESS NOTES
Chief Complaint   Patient presents with    Follow-up     Discuss MRI and blood test results.      Visit Vitals  /70 (BP 1 Location: Right arm, BP Patient Position: Sitting)   Pulse 70   Temp 98.1 °F (36.7 °C)   Resp 16   Ht 5' 5\" (1.651 m)   Wt 67.1 kg (148 lb)   SpO2 98%   BMI 24.63 kg/m²

## 2020-10-20 NOTE — PROGRESS NOTES
[address] [facility],[address]  [address]   [place] [address] [place] [address] [address]   [phone] Office   [phone] Fax           Date:  10/20/20     Name:  QUANG ANRVAEZ  :  1965  MRN:  [id number]     PCP:  Theodore Dumont DO    Chief Complaint   Patient presents with    Follow-up     Discuss MRI and blood test results. HISTORY OF PRESENT ILLNESS: Follow up for falls, abnormal movement, and gait instability. He was seen initially by Dr. Kiley Vogel. At that visit, he was reported to have abnormal jerking movement in the lower extremity which is causing him to fall. He was leaning off to the left a lot as well. These issues are unchanged at this point. For further evaluation, he was sent for a carotid Doppler, EEG, EMG of the lower extremities, MRI of the brain, and laboratory studies. The vast majority of this was normal or at least normal for his present medication use as the abnormalities noted on EEG could be secondary to his use of lithium and Haldol. On EMG, he does appear to have evidence of neuropathy. Due to ongoing issues with jerking movements in the lower extremity and mild gait instability, he was sent for additional evaluation with MRI of the cervical spine and MRI of the thoracic spine. The thoracic spine MRI was normal.  He does have some degenerative and congenital issues noted on MRI of the cervical spine with some canal stenosis without compression of the spinal cord. Recap from initial visit:  Ongoing abnormality of gait with abnormal movements and falls. The only abnormal test thus far is EMG testing which demonstrates an idiopathic peripheral neuropathy. While this certainly could cause him to have some sensory ataxia, it certainly would not cause a change in his posture. I question whether or not he may actually have a decrease in curvature of the spine such as what would be seen in scoliosis.   To further evaluate for underlying causes of the neuropathy, he will be sent for thyroid function testing and B12 level. Random blood glucose level was only 82 so I think underlying diabetic issues are unlikely. To ensure there is no cord abnormality or stenosis, he will be sent for MRI of the cervical and thoracic spine. Certainly his use of neuroleptic medication, specifically the lithium could cause some gait abnormality. They will discuss use of lithium with their psychiatrist.    Elizabeth Fletcher: MRI CERV SPINE WO CONT: October 2, 2020     INDICATION: . Unspecified abnormalities of gait and mobility     COMPARISON: None     TECHNIQUE: MR imaging of the cervical spine was performed using the following  sequences: sagittal T1, T2, STIR;  axial T2, T1.      CONTRAST:  None.     FINDINGS:     Grade 1 retrolisthesis of C3 on C4, C4 on C5, and C5 on C6. The vertebral bodies  of C4, C5, and C6 are lower than expected, although there is no definite  deformity or abnormal signal to suggest acute fracture. Possibly congenital  deformity. There is partial fusion of C6 and C7. Altogether, these cause  exaggerated slightly cervical lordosis centered about C5-6.     Marrow signal is normal.     The craniocervical junction is intact. The course, caliber, and signal intensity  of the spinal cord are normal.       The paraspinal soft tissues are within normal limits.     C2-C3: No herniation or stenosis.     C3-C4: Small central disc protrusion which causes minimal indentation but no  compression of the spinal cord. No neural foraminal narrowing.     C4-C5: Disc osteophyte complex centered about the right foraminal/paracentral  region which causes mild canal narrowing, indenting the thecal sac but without  cord compression.  There is moderate to severe right-sided neural foraminal  narrowing, only mild narrowing on the left.     C5-C6: Mild disc osteophyte complex centered about the left  foraminal/paracentral region which causes mild left-sided neural foraminal  narrowing but no spinal canal narrowing.     C6-C7: There is partial fusion of C6 and C7. No associated spinal canal or  neural foraminal narrowing. C7-T1: No herniation or stenosis.     IMPRESSION:     1. There is grade 1 retrolisthesis of C3 on C4, C4 on C5, and C5 on C6, as well  as partial fusion of C6 and C7. The heights of the vertebral bodies of C4, C5,  and C6 are less than expected although there is no evidence of acute deformity. Altogether, there is slightly exaggerated cervical lordosis centered about C5-6. These findings may be congenital, or very chronic in nature. 2. Disc osteophyte complexes at C4-5 and C5-6 which causes moderate to severe  right neural foraminal narrowing at C4-5, and mild narrowing at left C4-5 and  left C5-6.  3. Other degenerative findings as detailed above. EXAM: MRI [facility] SPINE WO CONT: October 2, 2020     INDICATION: . Unspecified abnormalities of gait and mobility     COMPARISON: None     TECHNIQUE: MR imaging of the thoracic spine was performed using the following  sequences: sagittal T1, T2, stir; axial T1, T2.      CONTRAST: None.     FINDINGS:     There is normal alignment of the thoracic spine. Vertebral body heights are  maintained. Marrow signal is normal.     The course, caliber, and signal intensity of the spinal cord are normal.      Incidentally noted bilateral thyroid nodules, the largest measuring 1.1 cm on  the left.      There is no herniation or stenosis.     IMPRESSION: Normal examination of the thoracic spine.     BRAIN MRI WITH AND WITHOUT CONTRAST: 8/24/2020 11:41 AM     INDICATION: Abnormal involuntary movements, falls, leans left.     COMPARISON: None.     TECHNIQUE: Images were obtained before and after IV contrast in multiple planes  and sequences to emphasize T1, T2, and T2* information. Diffusion-weighted  images and ADC maps were also obtained.  15 cc IV Dotarem was administered.     FINDINGS: Scattered white matter signal abnormalities are predominantly  subcortical. They are concentrated in the frontal and parietal lobes, with a few  foci scattered in the temporal and occipital lobes, and left cerebellum. This  likely represents chronic small vessel ischemic disease. Demyelinating disease  is possible. No radiating, pericallosal, white matter signal abnormalities to  definitely suggest multiple sclerosis. No enhancement to suggest active  demyelinating disease.     The ventricles and sulci are appropriate for age. The main intracranial  flow-voids are normal. No restricted diffusion to suggest acute infarction. No  hemorrhage. Maxillary and ethmoid paranasal sinus mucosal disease is mild. IMPRESSION:   1. Scattered subcortical white matter signal abnormalities. Chronic small vessel  ischemic disease favored over demyelinating disease. 2. No radiating, pericallosal, white matter lesions to definitely suggest  demyelinating disease. No enhancement to suggest active demyelinating disease. Carotid Doppler Interpretation Summary  1-15% bilateral ICA      Cerebrovascular Findings   Right Carotid   The right CCA is patent. There is minimal stenosis in the right ICA. There is intimal thickening present in the right ICA. The right ICA has heterogeneous plaque. The right ECA is patent. The right vertebral is antegrade. Left Carotid   The left CCA is patent. There is minimal stenosis in the left ICA. There is intimal thickening present in the left ICA. The left ECA is patent. The left vertebral is antegrade.      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 EEG Interpretation: This EEG recorded during the awake state is abnormal secondary to mild disorganization of the background rhythms, indicative of a mild degree of bihemispheric dysfunction as is commonly seen with an encephalopathy, which may have contributions from toxic, metabolic, diffuse structural and/or pharmacologic effects, and clinical correlation is recommended. This pattern is also seen in chronic neurodegenerative disorders and clinical correlation is recommended.     VITAMIN B12   Order: [id number]   Collected:  10/5/2020 09:46   View Full Report   Ref Range & Units  2wk ago   Vitamin B12  232 - 1,245 pg/mL  314        Narrative     Performed at: [address]  [id number]   : Lexy Payne MD, Phone:  [phone]             TSH 3RD GENERATION   Order: [id number]   Collected:  10/5/2020 09:46   View Full Report   Ref Range & Units  2wk ago   TSH  0.450 - 4.500 uIU/mL  2.210        Narrative     Performed at: [address]  [id number]   : Lexy Payne MD, Phone:  [phone]             T4, FREE   Order: [id number]   Collected:  10/5/2020 09:46   View Full Report   Ref Range & Units  2wk ago   T4, Free  0.82 - 1.77 ng/dL  1.21        Narrative     Performed at: [address]  [id number]   : Lexy Payne MD, Phone:  [phone]              LITHIUM   Order: [id number]   Collected:  8/11/2020 13:34   View Full Report   Ref Range & Units  1mo ago   Lithium level  0.6 - 1.2 mmol/L  1.1     Comment:                                  Detection Limit = 0.1                             <0.1 indicates None Detected       Narrative     Performed at: [address]  [id number]   : Jammie Rome MD, Phone:  [phone]             COPPER   Order: [id number]   Collected:  8/11/2020 13:34   View Full Report   Ref Range & Units  1mo ago   Copper, serum  72 - 166 ug/dL  99     Comment:                                 Detection Limit = 5       Narrative     Test(s) 757401-[id number], Serum   was developed and its performance characteristics determined   by [facility]. It has not been cleared or approved by the Food   and Drug Administration. Performed at: [address]  [id number]   : Jammie Rome MD, Phone:  [phone]             CERULOPLASMIN   Order: [id number]   Collected:  8/11/2020 13:34   View Full Report   Ref Range & Units  1mo ago   Ceruloplasmin  16.0 - 31.0 mg/dL  23.6        Narrative     Performed at: [address]  [id number]   : Jammie Rome MD, Phone:  [phone]             METABOLIC PANEL, COMPREHENSIVE   Order: [id number]   Collected:  8/11/2020 13:34   View Full Report   Ref Range & Units  1mo ago   Glucose  65 - 99 mg/dL  82     BUN  6 - 24 mg/dL  15     Creatinine  0.76 - 1.27 mg/dL  1.16     GFR est non-AA  >59 mL/min/1.73  70     GFR est AA  >59 mL/min/1.73  81     BUN/Creatinine ratio  9 - 20  13     Sodium  134 - 144 mmol/L  142     Potassium  3.5 - 5.2 mmol/L  3.9     Chloride  96 - 106 mmol/L  105     CO2  20 - 29 mmol/L  24     Calcium  8.7 - 10.2 mg/dL  9.3     Protein, total  6.0 - 8.5 g/dL  6.4     Albumin  3.8 - 4.9 g/dL  4.3     GLOBULIN, TOTAL  1.5 - 4.5 g/dL  2.1     A-G Ratio  1.2 - 2.2  2.0     Bilirubin, total  0.0 - 1.2 mg/dL  0.3     Alk.  phosphatase  39 - 117 IU/L  68     AST (SGOT)  0 - 40 IU/L  16     ALT (SGPT)  0 - 44 IU/L  17        Narrative     Performed at: [address]  [id number]   : Cruz Lock Fanny Craven MD, Phone:  [phone]              Except as noted above, denies  fever, chills, cough. No CP or SOB. No dysuria, loss of bowel or bladder control. No Weight loss. Appetite good. Sleeping well. No sweats. No edema. No bruising or bleeding. No nausea or vomit. No diarrhea. No frequency, urgency, No depressive sxs. No anxiety. Denies sore throat, nasal congestion, nasal discharge, epistaxis, tinnitus, hearing loss, back pain, muscle pain, or joint pain. Current Outpatient Medications   Medication Sig    benztropine (COGENTIN) 1 mg tablet Take 1 mg by mouth two (2) times a day.  buPROPion XL (WELLBUTRIN XL) 150 mg tablet Take 1 Tab by mouth daily.  haloperidoL (HALDOL) 5 mg tablet Take 1 Tab by mouth daily.  lithium carbonate 300 mg tablet Take 1 Tab by mouth three (3) times daily.  temazepam (RESTORIL) 30 mg capsule Take 1 Cap by mouth nightly.  haloperidol decanoate (HALDOL DECANOATE) 100 mg/mL injection 1 mL by IntraMUSCular route every twenty-eight (28) days. (Patient taking differently: 100 mg by IntraMUSCular route every fourteen (14) days.)     Current Facility-Administered Medications   Medication Dose Route Frequency    haloperidol decanoate (HALDOL DECANOATE) 100 mg/mL LA injection 100 mg  100 mg IntraMUSCular Q28D     No Known Allergies  Past Medical History:   Diagnosis Date    Anxiety     Depression     Schizoaffective disorder, bipolar type ([facility] [address]      History reviewed. No pertinent surgical history.   Social History     Socioeconomic History    Marital status: SINGLE     Spouse name: Not on file    Number of children: Not on file    Years of education: Not on file    Highest education level: Not on file   Occupational History    Not on file   Social Needs    Financial resource strain: Not on file    Food insecurity     Worry: Not on file     Inability: Not on file    Transportation needs     Medical: Not on file     Non-medical: Not on file   Tobacco Use    Smoking status: Former Smoker     Packs/day: 1.00     Last attempt to quit:      Years since quittin.8    Smokeless tobacco: Never Used   Substance and Sexual Activity    Alcohol use: Not Currently    Drug use: Never    Sexual activity: Not on file   Lifestyle    Physical activity     Days per week: Not on file     Minutes per session: Not on file    Stress: Not on file   Relationships    Social connections     Talks on phone: Not on file     Gets together: Not on file     Attends Shinto service: Not on file     Active member of club or organization: Not on file     Attends meetings of clubs or organizations: Not on file     Relationship status: Not on file    Intimate partner violence     Fear of current or ex partner: Not on file     Emotionally abused: Not on file     Physically abused: Not on file     Forced sexual activity: Not on file   Other Topics Concern    Not on file   Social History Narrative    Not on file     Family History   Problem Relation Age of Onset    Cancer Father         bone    Pancreatic Cancer Father     Dementia Mother        PHYSICAL EXAMINATION:    Visit Vitals  /70 (BP 1 Location: Right arm, BP Patient Position: Sitting)   Pulse 70   Temp 98.1 °F (36.7 °C)   Resp 16   Ht 5' 5\" (1.651 m)   Wt 67.1 kg (148 lb)   SpO2 98%   BMI 24.63 kg/m²     General:  Well defined, nourished, and groomed individual in no acute distress. Neck: Supple, nontender, no bruits, no pain with resistance to active range of motion. Heart: Regular rate and rhythm, no murmurs, rub, or gallop. Normal S1S2. Lungs:  Clear to auscultation bilaterally with equal chest expansion, no cough, no wheeze  Musculoskeletal:  Extremities revealed no edema and had full range of motion of joints. Psych:  Good mood and bright affect    NEUROLOGICAL EXAMINATION:     Mental Status:   Alert and oriented to person, place, and time with recent and remote memory intact.   Attention span and concentration are normal. Speech is fluent with a full fund of knowledge. Cranial Nerves:  I: smell Not tested   II: visual fields Full to confrontation   II: pupils Equal, round, reactive to light   II: optic disc No papilledema   III,VII: ptosis none   III,IV,VI: extraocular muscles  Full ROM   V: mastication normal   V: facial light touch sensation  normal   VII: facial muscle function   symmetric   VIII: hearing symmetric   IX: soft palate elevation  normal   XI: trapezius strength  5/5   XI: sternocleidomastoid strength 5/5   XI: neck flexion strength  5/5   XII: tongue  midline     Motor Examination: Normal tone, bulk, and strength, 5/5 muscle strength throughout. No cogwheel rigidity or clonus present. Coordination:  Finger to nose and rapid arm movement testing was normal with only a trace intention tremor which is likely secondary to his neuroleptic use. Gait and Station:  Steady while walking without a bilateral arm swing. In looking at his posture, his left shoulder does appear lower both with sitting and standing. No pronator drift. No muscle wasting or fasiculations noted. Reflexes:  DTRs 2+ throughout. ASSESSMENT AND PLAN    ICD-10-CM ICD-9-CM    1. Abnormality of gait  R26.9 781.2    2. Idiopathic peripheral neuropathy  G60.9 356.9    3. Abnormal involuntary movement  R25.9 781.0      At this point, his neurological work-up has been largely normal without any objective findings to explain abnormality of his gait or involuntary movement. Recommended that he continue to follow-up with psychiatry and discussed lithium use with them as this could be partly responsible for some of the gait issues. I will plan to see him back sometime in December after he is seeing psychiatry. With or without any adjustments to the lithium, if the abnormal gait is still occurring, he may actually benefit from physical therapy. Leticia Owusu

## 2020-11-12 ENCOUNTER — TELEPHONE (OUTPATIENT)
Dept: FAMILY MEDICINE CLINIC | Age: 55
End: 2020-11-12

## 2020-11-12 NOTE — TELEPHONE ENCOUNTER
Patients brother left a message inquiring if we received patients lab results from the neurologist in October.  Please call him back at [phone]

## 2020-11-16 NOTE — TELEPHONE ENCOUNTER
Spoke with pts brother. Let him know that we have not received any records from the Neuro. States that he will call them.

## 2020-12-09 ENCOUNTER — CLINICAL SUPPORT (OUTPATIENT)
Dept: FAMILY MEDICINE CLINIC | Age: 55
End: 2020-12-09
Payer: MEDICARE

## 2020-12-09 VITALS
RESPIRATION RATE: 18 BRPM | DIASTOLIC BLOOD PRESSURE: 78 MMHG | BODY MASS INDEX: 24.63 KG/M2 | HEART RATE: 71 BPM | SYSTOLIC BLOOD PRESSURE: 119 MMHG | OXYGEN SATURATION: 97 % | TEMPERATURE: 97.9 F | WEIGHT: 148 LBS

## 2020-12-09 DIAGNOSIS — F20.89 OTHER SCHIZOPHRENIA (HCC): Primary | ICD-10-CM

## 2020-12-09 PROCEDURE — 96372 THER/PROPH/DIAG INJ SC/IM: CPT | Performed by: FAMILY MEDICINE

## 2020-12-09 RX ORDER — HALOPERIDOL DECANOATE 100 MG/ML
100 INJECTION INTRAMUSCULAR ONCE
Status: COMPLETED | OUTPATIENT
Start: 2020-12-09 | End: 2020-12-09

## 2020-12-09 RX ADMIN — HALOPERIDOL DECANOATE 100 MG: 100 INJECTION INTRAMUSCULAR at 15:43

## 2020-12-23 ENCOUNTER — CLINICAL SUPPORT (OUTPATIENT)
Dept: FAMILY MEDICINE CLINIC | Age: 55
End: 2020-12-23
Payer: MEDICARE

## 2020-12-23 VITALS
SYSTOLIC BLOOD PRESSURE: 125 MMHG | HEART RATE: 82 BPM | TEMPERATURE: 97.8 F | DIASTOLIC BLOOD PRESSURE: 84 MMHG | WEIGHT: 149.6 LBS | HEIGHT: 65 IN | BODY MASS INDEX: 24.93 KG/M2 | OXYGEN SATURATION: 97 %

## 2020-12-23 DIAGNOSIS — F20.9 SCHIZOPHRENIA, UNSPECIFIED TYPE (HCC): Primary | ICD-10-CM

## 2020-12-23 PROCEDURE — 96372 THER/PROPH/DIAG INJ SC/IM: CPT | Performed by: FAMILY MEDICINE

## 2020-12-23 RX ORDER — HALOPERIDOL DECANOATE 100 MG/ML
100 INJECTION INTRAMUSCULAR ONCE
Status: COMPLETED | OUTPATIENT
Start: 2020-12-23 | End: 2020-12-23

## 2020-12-23 RX ADMIN — HALOPERIDOL DECANOATE 100 MG: 100 INJECTION INTRAMUSCULAR at 15:38

## 2021-01-06 ENCOUNTER — CLINICAL SUPPORT (OUTPATIENT)
Dept: FAMILY MEDICINE CLINIC | Age: 56
End: 2021-01-06
Payer: MEDICARE

## 2021-01-06 VITALS
HEART RATE: 71 BPM | WEIGHT: 148 LBS | SYSTOLIC BLOOD PRESSURE: 121 MMHG | OXYGEN SATURATION: 98 % | TEMPERATURE: 97.9 F | DIASTOLIC BLOOD PRESSURE: 79 MMHG | BODY MASS INDEX: 24.63 KG/M2 | RESPIRATION RATE: 18 BRPM

## 2021-01-06 DIAGNOSIS — F25.9 SCHIZO AFFECTIVE SCHIZOPHRENIA (HCC): Primary | ICD-10-CM

## 2021-01-06 PROCEDURE — 96372 THER/PROPH/DIAG INJ SC/IM: CPT | Performed by: NURSE PRACTITIONER

## 2021-01-06 RX ORDER — HALOPERIDOL DECANOATE 100 MG/ML
100 INJECTION INTRAMUSCULAR
Status: DISCONTINUED | OUTPATIENT
Start: 2021-01-06 | End: 2021-09-10

## 2021-01-06 RX ADMIN — HALOPERIDOL DECANOATE 100 MG: 100 INJECTION INTRAMUSCULAR at 15:07

## 2021-01-20 ENCOUNTER — CLINICAL SUPPORT (OUTPATIENT)
Dept: FAMILY MEDICINE CLINIC | Age: 56
End: 2021-01-20
Payer: MEDICARE

## 2021-01-20 VITALS
HEART RATE: 78 BPM | BODY MASS INDEX: 24.46 KG/M2 | DIASTOLIC BLOOD PRESSURE: 78 MMHG | OXYGEN SATURATION: 96 % | HEIGHT: 65 IN | TEMPERATURE: 98.4 F | SYSTOLIC BLOOD PRESSURE: 116 MMHG | WEIGHT: 146.8 LBS

## 2021-01-20 DIAGNOSIS — F20.9 SCHIZOPHRENIA, UNSPECIFIED TYPE (HCC): Primary | ICD-10-CM

## 2021-01-20 PROCEDURE — 96372 THER/PROPH/DIAG INJ SC/IM: CPT | Performed by: FAMILY MEDICINE

## 2021-01-20 RX ORDER — HALOPERIDOL DECANOATE 100 MG/ML
100 INJECTION INTRAMUSCULAR ONCE
Status: COMPLETED | OUTPATIENT
Start: 2021-01-20 | End: 2021-01-20

## 2021-01-20 RX ADMIN — HALOPERIDOL DECANOATE 100 MG: 100 INJECTION INTRAMUSCULAR at 15:06

## 2021-02-03 ENCOUNTER — CLINICAL SUPPORT (OUTPATIENT)
Dept: FAMILY MEDICINE CLINIC | Age: 56
End: 2021-02-03
Payer: MEDICARE

## 2021-02-03 VITALS
TEMPERATURE: 98.6 F | BODY MASS INDEX: 24.66 KG/M2 | WEIGHT: 148 LBS | RESPIRATION RATE: 18 BRPM | HEART RATE: 74 BPM | OXYGEN SATURATION: 96 % | HEIGHT: 65 IN | DIASTOLIC BLOOD PRESSURE: 76 MMHG | SYSTOLIC BLOOD PRESSURE: 109 MMHG

## 2021-02-03 DIAGNOSIS — F20.9 SCHIZOPHRENIA, UNSPECIFIED TYPE (HCC): Primary | ICD-10-CM

## 2021-02-03 RX ORDER — HALOPERIDOL DECANOATE 100 MG/ML
100 INJECTION INTRAMUSCULAR
Status: DISCONTINUED | OUTPATIENT
Start: 2021-02-03 | End: 2021-09-10

## 2021-02-15 RX ORDER — HALOPERIDOL DECANOATE 100 MG/ML
100 INJECTION INTRAMUSCULAR
Status: DISCONTINUED | OUTPATIENT
Start: 2021-02-15 | End: 2021-09-10

## 2021-02-17 ENCOUNTER — CLINICAL SUPPORT (OUTPATIENT)
Dept: FAMILY MEDICINE CLINIC | Age: 56
End: 2021-02-17
Payer: MEDICARE

## 2021-02-17 VITALS
HEIGHT: 65 IN | RESPIRATION RATE: 18 BRPM | BODY MASS INDEX: 24.83 KG/M2 | TEMPERATURE: 97.8 F | OXYGEN SATURATION: 96 % | WEIGHT: 149 LBS | SYSTOLIC BLOOD PRESSURE: 114 MMHG | DIASTOLIC BLOOD PRESSURE: 72 MMHG | HEART RATE: 80 BPM

## 2021-02-17 DIAGNOSIS — F20.9 SCHIZOPHRENIA, UNSPECIFIED TYPE (HCC): Primary | ICD-10-CM

## 2021-02-17 PROCEDURE — 96372 THER/PROPH/DIAG INJ SC/IM: CPT | Performed by: FAMILY MEDICINE

## 2021-02-17 RX ORDER — HALOPERIDOL DECANOATE 100 MG/ML
100 INJECTION INTRAMUSCULAR
Status: DISCONTINUED | OUTPATIENT
Start: 2021-02-17 | End: 2021-09-10

## 2021-02-17 RX ADMIN — HALOPERIDOL DECANOATE 100 MG: 100 INJECTION INTRAMUSCULAR at 16:36

## 2021-03-03 ENCOUNTER — CLINICAL SUPPORT (OUTPATIENT)
Dept: FAMILY MEDICINE CLINIC | Age: 56
End: 2021-03-03
Payer: MEDICARE

## 2021-03-03 VITALS
HEIGHT: 65 IN | DIASTOLIC BLOOD PRESSURE: 77 MMHG | SYSTOLIC BLOOD PRESSURE: 121 MMHG | OXYGEN SATURATION: 93 % | BODY MASS INDEX: 24.66 KG/M2 | WEIGHT: 148 LBS | HEART RATE: 81 BPM | TEMPERATURE: 98.7 F

## 2021-03-03 DIAGNOSIS — F20.9 SCHIZOPHRENIA, UNSPECIFIED TYPE (HCC): Primary | ICD-10-CM

## 2021-03-03 PROCEDURE — 96372 THER/PROPH/DIAG INJ SC/IM: CPT | Performed by: FAMILY MEDICINE

## 2021-03-03 RX ORDER — HALOPERIDOL DECANOATE 100 MG/ML
100 INJECTION INTRAMUSCULAR
Status: DISCONTINUED | OUTPATIENT
Start: 2021-03-03 | End: 2021-09-10

## 2021-03-03 RX ADMIN — HALOPERIDOL DECANOATE 100 MG: 100 INJECTION INTRAMUSCULAR at 15:34

## 2021-03-17 ENCOUNTER — CLINICAL SUPPORT (OUTPATIENT)
Dept: FAMILY MEDICINE CLINIC | Age: 56
End: 2021-03-17
Payer: MEDICARE

## 2021-03-17 VITALS
RESPIRATION RATE: 18 BRPM | BODY MASS INDEX: 25.16 KG/M2 | WEIGHT: 151 LBS | DIASTOLIC BLOOD PRESSURE: 82 MMHG | SYSTOLIC BLOOD PRESSURE: 127 MMHG | HEIGHT: 65 IN | TEMPERATURE: 97.9 F | HEART RATE: 81 BPM | OXYGEN SATURATION: 96 %

## 2021-03-17 DIAGNOSIS — F20.9 SCHIZOPHRENIA, UNSPECIFIED TYPE (HCC): Primary | ICD-10-CM

## 2021-03-17 PROCEDURE — 96372 THER/PROPH/DIAG INJ SC/IM: CPT | Performed by: NURSE PRACTITIONER

## 2021-03-17 RX ORDER — HALOPERIDOL DECANOATE 100 MG/ML
100 INJECTION INTRAMUSCULAR
Status: DISCONTINUED | OUTPATIENT
Start: 2021-03-17 | End: 2021-09-10

## 2021-03-18 RX ADMIN — HALOPERIDOL DECANOATE 100 MG: 100 INJECTION INTRAMUSCULAR at 09:01

## 2021-03-19 RX ADMIN — HALOPERIDOL DECANOATE 100 MG: 100 INJECTION INTRAMUSCULAR at 07:49

## 2021-03-31 ENCOUNTER — CLINICAL SUPPORT (OUTPATIENT)
Dept: FAMILY MEDICINE CLINIC | Age: 56
End: 2021-03-31
Payer: MEDICARE

## 2021-03-31 VITALS
OXYGEN SATURATION: 96 % | SYSTOLIC BLOOD PRESSURE: 111 MMHG | WEIGHT: 150.6 LBS | HEART RATE: 79 BPM | BODY MASS INDEX: 25.09 KG/M2 | HEIGHT: 65 IN | TEMPERATURE: 98.8 F | DIASTOLIC BLOOD PRESSURE: 73 MMHG

## 2021-03-31 DIAGNOSIS — F20.9 SCHIZOPHRENIA, UNSPECIFIED TYPE (HCC): Primary | ICD-10-CM

## 2021-03-31 PROCEDURE — 96372 THER/PROPH/DIAG INJ SC/IM: CPT | Performed by: FAMILY MEDICINE

## 2021-03-31 RX ORDER — HALOPERIDOL DECANOATE 100 MG/ML
100 INJECTION INTRAMUSCULAR ONCE
Status: COMPLETED | OUTPATIENT
Start: 2021-03-31 | End: 2021-03-31

## 2021-03-31 RX ORDER — HALOPERIDOL DECANOATE 100 MG/ML
100 INJECTION INTRAMUSCULAR
Qty: 1 ML | Refills: 0 | Status: ON HOLD | OUTPATIENT
Start: 2021-03-31 | End: 2021-09-05

## 2021-03-31 RX ADMIN — HALOPERIDOL DECANOATE 100 MG: 100 INJECTION INTRAMUSCULAR at 15:59

## 2021-04-14 ENCOUNTER — CLINICAL SUPPORT (OUTPATIENT)
Dept: FAMILY MEDICINE CLINIC | Age: 56
End: 2021-04-14

## 2021-04-14 VITALS
RESPIRATION RATE: 18 BRPM | DIASTOLIC BLOOD PRESSURE: 76 MMHG | TEMPERATURE: 98.4 F | OXYGEN SATURATION: 97 % | HEIGHT: 65 IN | BODY MASS INDEX: 24.99 KG/M2 | HEART RATE: 75 BPM | SYSTOLIC BLOOD PRESSURE: 121 MMHG | WEIGHT: 150 LBS

## 2021-04-14 DIAGNOSIS — F20.9 SCHIZOPHRENIA, UNSPECIFIED TYPE (HCC): Primary | ICD-10-CM

## 2021-04-28 ENCOUNTER — CLINICAL SUPPORT (OUTPATIENT)
Dept: FAMILY MEDICINE CLINIC | Age: 56
End: 2021-04-28
Payer: MEDICARE

## 2021-04-28 VITALS
HEIGHT: 65 IN | DIASTOLIC BLOOD PRESSURE: 74 MMHG | TEMPERATURE: 97.1 F | RESPIRATION RATE: 16 BRPM | WEIGHT: 147 LBS | HEART RATE: 77 BPM | OXYGEN SATURATION: 97 % | SYSTOLIC BLOOD PRESSURE: 110 MMHG | BODY MASS INDEX: 24.49 KG/M2

## 2021-04-28 DIAGNOSIS — F25.9 SCHIZO AFFECTIVE SCHIZOPHRENIA (HCC): Primary | ICD-10-CM

## 2021-04-28 PROCEDURE — 96372 THER/PROPH/DIAG INJ SC/IM: CPT | Performed by: FAMILY MEDICINE

## 2021-04-28 RX ADMIN — HALOPERIDOL DECANOATE 100 MG: 100 INJECTION INTRAMUSCULAR at 15:17

## 2021-04-28 NOTE — PROGRESS NOTES
Chief Complaint   Patient presents with    Injection     Visit Vitals  /74 (BP 1 Location: Left upper arm, BP Patient Position: Sitting, BP Cuff Size: Adult)   Pulse 77   Temp 97.1 °F (36.2 °C) (Skin)   Resp 16   Ht 5' 5\" (1.651 m)   Wt 147 lb (66.7 kg)   SpO2 97%   BMI 24.46 kg/m²     Patient came to clinic for Haldol injection, patient tolerated injection well.

## 2021-05-12 ENCOUNTER — CLINICAL SUPPORT (OUTPATIENT)
Dept: FAMILY MEDICINE CLINIC | Age: 56
End: 2021-05-12
Payer: MEDICARE

## 2021-05-12 VITALS
BODY MASS INDEX: 25.33 KG/M2 | DIASTOLIC BLOOD PRESSURE: 80 MMHG | RESPIRATION RATE: 16 BRPM | SYSTOLIC BLOOD PRESSURE: 128 MMHG | HEART RATE: 83 BPM | HEIGHT: 65 IN | WEIGHT: 152 LBS | OXYGEN SATURATION: 97 % | TEMPERATURE: 98.7 F

## 2021-05-12 DIAGNOSIS — F20.9 SCHIZOPHRENIA, UNSPECIFIED TYPE (HCC): Primary | ICD-10-CM

## 2021-05-12 DIAGNOSIS — R41.3 MEMORY IMPAIRMENT: ICD-10-CM

## 2021-05-12 DIAGNOSIS — E11.9 TYPE 2 DIABETES MELLITUS WITHOUT COMPLICATION, WITHOUT LONG-TERM CURRENT USE OF INSULIN (HCC): ICD-10-CM

## 2021-05-12 DIAGNOSIS — R25.1 TREMOR: ICD-10-CM

## 2021-05-12 PROCEDURE — 99213 OFFICE O/P EST LOW 20 MIN: CPT | Performed by: FAMILY MEDICINE

## 2021-05-12 PROCEDURE — 96372 THER/PROPH/DIAG INJ SC/IM: CPT | Performed by: FAMILY MEDICINE

## 2021-05-12 RX ADMIN — HALOPERIDOL DECANOATE 100 MG: 100 INJECTION INTRAMUSCULAR at 12:18

## 2021-05-12 RX ADMIN — HALOPERIDOL DECANOATE 100 MG: 100 INJECTION INTRAMUSCULAR at 14:53

## 2021-05-12 NOTE — PATIENT INSTRUCTIONS
General Health and concerns:  HEART HEALTHY DIET:  A heart healthy diet is one that is low in cholesterol (less than 300 mg daily), fat (less than 80 g daily) . You should also minimize carbohydrates / sugars (less amounts of breads, pastas, potato and potato products and sugary foods/snacks, cookies, cakes, etc) . Try to eat whole wheat/multigrain breads and pastas and eat more vegetables. Cook with olive oil (or no oil) and grill, bake, broil or boil foods. Less red meat and more chicken , fish and lean cuts of beef (limited). 2629-5671 calories per day is sufficient [id number] is acceptable for weight loss. EXERCISE:  You should do exercise 3-5 days per week (minimum) to include increasing your heart rate for 30 to 45 minutes. At least a pace of a brisk walk should do that. This build up your heart and lung endurance and muscles and helps many function of the body. OTHER:        Routine Health maintenance: You need to get a yearly follow up/physical exam to review, discuss age and gender appropriate exams, labs, vaccines and screening tests. This includes cardiovascular health risk, cancer screens and other clive related topics. Medications-Take all medications as directed. Please do not stop unless you talk to your doctor or health care provider first. Report any problems immediately. Referrals: if you have been given a referral, please call the office if you do not hear from provider in one week. You may make the appointment yourself. Please keep all appointments with specialists and ask them to send their notes, thoughts, recommendations to us , as your PCP. Imaging/Labs:  Be sure to get these images in a timely manner. IF your test must be scheduled, let us know if you need help getting this done and if you do not hear from that provider in a week , call us or them.   BE SURE to call the office if you do not hear regarding the results in one week after the test is performed Image or lab). It is our intention to inform you of the results ALWAYS, even if normal you should get a notification (Call, portal message). PLEASE wilfrido if you do not get the results. PLEASE follow all recommendations and call/come in /ask questions if you do not understand of if problems develop after or in between visits. Failure to comply with recommended health care advise could result in serious health consequences. Thank you for choosing our practice and please let us know how we can help you feel better and stay well!

## 2021-05-12 NOTE — PROGRESS NOTES
Visit Vitals  /80 (BP 1 Location: Left upper arm, BP Patient Position: Sitting, BP Cuff Size: Adult)   Pulse 83   Temp 98.7 °F (37.1 °C) (Skin)   Resp 16   Ht 5' 5\" (1.651 m)   Wt 152 lb (68.9 kg)   SpO2 97%   BMI 25.29 kg/m²     Chief Complaint   Patient presents with    Injection     Haldol injection     1. Have you been to the ER, urgent care clinic since your last visit? Hospitalized since your last visit? No    2. Have you seen or consulted any other health care providers outside of the [address] [place] [address] since your last visit? Include any pap smears or colon screening. No      Patient came to clinic for Haldol injection, patient stumbled in exam room and went to his knees, PCP immediately notified, PCP examined patient, all vitals within normal limits, following examination PCP instructed me to give patient injection, following injection PCP monitored patient.

## 2021-05-12 NOTE — PROGRESS NOTES
IDENTIFYING INFORMATION:  Shirley Christopher , 60 y.o., male  [address] [place], [place] [address]         CHIEF COMPLAINT:     Chief Complaint   Patient presents with    Injection     Haldol injection         HISTORY OF PRESENT ILLNESS:    Shirley Christopher is a 60 y.o. male  has a past medical history of Anxiety, Depression, and Schizoaffective disorder, bipolar type ([address] .  he comes in for his injection of haloperidol. He gets every 2 weeks. Sometimes he is almost catatonic or catatonic like when he comes in he does not member say much. Nursing staff got me definite bulimia and he kind of fell forward onto his knees while moving to his sleep. He did not hit his head or hands. There is no erythema or bruising noted anywhere. His vital signs are stable. He does talk to me and answers questions appropriately makes good eye contact and seems to be his normal self. He denies any pain anywhere. He says he did not hurt himself. He says he was reaching for the seat and it was not as close as he thought. He has no visual disturbances per se. He still feels weak generally but has been going on for. He is even seen a neurologist. He continues to see psychiatry. He asked me if it is time to get some lab work. He denies any headache, neck pain, blurry or double vision no altered vision. He has no vertigo or tinnitus. He has no chest pain or palpitations. PAST MEDICAL HISTORY:     Past Medical History:   Diagnosis Date    Anxiety     Depression     Schizoaffective disorder, bipolar type ([address]        MEDICATIONS:     Current Outpatient Medications on File Prior to Visit   Medication Sig Dispense Refill    haloperidol decanoate (HALDOL DECANOATE) 100 mg/mL injection 1 mL by IntraMUSCular route every twenty-eight (28) days. 1 mL 0    benztropine (COGENTIN) 1 mg tablet Take 1 mg by mouth two (2) times a day.  buPROPion XL (WELLBUTRIN XL) 150 mg tablet Take 1 Tab by mouth daily.       haloperidoL (HALDOL) 5 mg tablet Take 1 Tab by mouth daily.  lithium carbonate 300 mg tablet Take 1 Tab by mouth three (3) times daily.  temazepam (RESTORIL) 30 mg capsule Take 1 Cap by mouth nightly. Current Facility-Administered Medications on File Prior to Visit   Medication Dose Route Frequency Provider Last Rate Last Admin    haloperidol decanoate (HALDOL DECANOATE) 100 mg/mL LA injection 100 mg  100 mg IntraMUSCular Q28D Jose Actis, NP   100 mg at 21 1453    haloperidol decanoate (HALDOL DECANOATE) 100 mg/mL LA injection 100 mg  100 mg IntraMUSCular Q28D Heddy Olivares, DO   100 mg at 21 1517    haloperidol decanoate (HALDOL DECANOATE) 100 mg/mL LA injection 100 mg  100 mg IntraMUSCular Q28D Heddy Olivares, DO   100 mg at 21 1636    haloperidol decanoate (HALDOL DECANOATE) 100 mg/mL LA injection 100 mg  100 mg IntraMUSCular Q28D Heddy Olivares, DO        haloperidol decanoate (HALDOL DECANOATE) 100 mg/mL LA injection 100 mg  100 mg IntraMUSCular Q28D Heddy Olivares, DO   100 mg at 21 1218    haloperidol decanoate (HALDOL DECANOATE) 100 mg/mL LA injection 100 mg  100 mg IntraMUSCular Q28D Jose Actis, NP   100 mg at 21 1507    haloperidol decanoate (HALDOL DECANOATE) 100 mg/mL LA injection 100 mg  100 mg IntraMUSCular Q28D Heddy Olivares, DO           ALLERGIES:    No Known Allergies      SOCIAL HISTORY:     Social History     Tobacco Use    Smoking status: Former Smoker     Packs/day: 1.00     Quit date:      Years since quittin.3    Smokeless tobacco: Never Used   Substance Use Topics    Alcohol use: Not Currently    Drug use: Never       SURGICAL HISTORY:    No past surgical history on file.      FAMILY HISTORY:    Family History   Problem Relation Age of Onset    Cancer Father         bone    Pancreatic Cancer Father     Dementia Mother          REVIEW OF SYSTEMS:    I personally collected this information from all available source present (patient/others in room and records available) Harini Double    Review of Systems   Constitutional: Negative for chills, diaphoresis, fever and weight loss. HENT: Negative for congestion, ear pain, hearing loss, nosebleeds, sinus pain, sore throat and tinnitus. Eyes: Negative for blurred vision, double vision, photophobia and pain. Respiratory: Negative for cough, sputum production and shortness of breath. Cardiovascular: Negative for chest pain, palpitations, orthopnea, claudication, leg swelling and PND. Gastrointestinal: Negative for abdominal pain, blood in stool, constipation, diarrhea, heartburn, melena, nausea and vomiting. Genitourinary: Negative for dysuria, frequency and urgency. Musculoskeletal: Positive for falls and myalgias. Negative for back pain, joint pain and neck pain. Skin: Negative for itching and rash. Neurological: Positive for weakness. Negative for dizziness, tingling, tremors, sensory change, focal weakness and headaches. Psychiatric/Behavioral: Positive for depression, hallucinations and memory loss. Negative for suicidal ideas. The patient is nervous/anxious. The patient does not have insomnia. PHYSICAL EXAMINATION:    Vital Signs:    Visit Vitals  /80 (BP 1 Location: Left upper arm, BP Patient Position: Sitting, BP Cuff Size: Adult)   Pulse 83   Temp 98.7 °F (37.1 °C) (Skin)   Resp 16   Ht 5' 5\" (1.651 m)   Wt 152 lb (68.9 kg)   SpO2 97%   BMI 25.29 kg/m²         Wt Readings from Last 3 Encounters:   05/12/21 152 lb (68.9 kg)   04/28/21 147 lb (66.7 kg)   04/14/21 150 lb (68 kg)     BP Readings from Last 3 Encounters:   05/12/21 128/80   04/28/21 110/74   04/14/21 121/76         Physical Exam  Vitals signs reviewed. Constitutional:       General: He is not in acute distress. Appearance: He is not ill-appearing. Eyes:      General: No scleral icterus. Extraocular Movements: Extraocular movements intact.       Conjunctiva/sclera: Conjunctivae normal.      Pupils: Pupils are equal, round, and reactive to light. Neck:      Musculoskeletal: No neck rigidity or muscular tenderness. Vascular: No carotid bruit. Cardiovascular:      Rate and Rhythm: Normal rate and regular rhythm. Pulses: Normal pulses. Heart sounds: No murmur. No friction rub. No gallop. Pulmonary:      Effort: Pulmonary effort is normal.      Breath sounds: Normal breath sounds. No wheezing, rhonchi or rales. Abdominal:      General: There is no distension. Palpations: Abdomen is soft. There is no mass. Tenderness: There is no abdominal tenderness. There is no guarding. Musculoskeletal:         General: No swelling, tenderness, deformity or signs of injury. Right lower leg: No edema. Left lower leg: No edema. Lymphadenopathy:      Cervical: No cervical adenopathy. Skin:     General: Skin is warm and dry. Capillary Refill: Capillary refill takes less than 2 seconds. Coloration: Skin is not jaundiced. Findings: No bruising, erythema or rash. Neurological:      General: No focal deficit present. Mental Status: He is alert and oriented to person, place, and time. Mental status is at baseline. Cranial Nerves: No cranial nerve deficit. Sensory: No sensory deficit. Motor: No weakness. Coordination: Coordination normal.      Gait: Gait normal.      Deep Tendon Reflexes: Reflexes normal.      Comments: Upper and lower motor strength are symmetrical plus 5 out of 5 bilaterally. Little bit of a tremor in his hands bilaterally left is worse. No rigidity or atrophy. Gait is short based and narrow but he is stable on his feet. He can stand up and go less than 10 seconds. Actually less than 5 seconds. He is able to walk turned a corner exit exam room turned left in and down the forbes and then turned right without any difficulty. Psychiatric:         Mood and Affect: Mood normal.         Behavior: Behavior normal.         Thought Content:  Thought content normal. Judgment: Judgment normal.           ASSESSMENT/PLAN:      Discussion (regarding today's visit with Shirley RYAN Christopher); Patient fell today during regular nurse visit. I examined him and discussed his medical problems per his request.  He seems to be okay note no injuries. Nursing staff indicates he just went down to his knees. After he shot a second prep up  He will continue psychiatry care. Lab work is ordered. Follow-up x-ray as indicated. ICD-10-CM ICD-9-CM    1. Schizophrenia, unspecified type ([facility]  F20.9 295.90    2. Type 2 diabetes mellitus without complication, without long-term current use of insulin ([facility])  E05.5 480.72 METABOLIC PANEL, COMPREHENSIVE      LIPID PANEL      HEMOGLOBIN A1C WITH EAG   3. Tremor  R25.1 781.0 CBC WITH AUTOMATED DIFF      TSH 3RD GENERATION   4. Memory impairment  R41.3 780.93 CBC WITH AUTOMATED DIFF      TSH 3RD GENERATION       WE reviewed each diagnosis listed for today's visit including medications, treatment, testing such as labs, imagine, referrals and when to call regarding results and appointments. Reminded patient to keep any and all appointments with specialists, labs, imaging. Reminded patient to make sure we get copies of any specialists care, labs and imaging. Reminded patient to call of come by the office if there are any concerns, questions , comments or problems. The patient verbalized understanding of the care plan and all questions were answered to the patient's satisfaction prior to leaving the office. The patient was told that failure to comply with recommended testing could result in abnormal health consequences. The patient was instructed to have yearly routine health maintenance including but not limited to age appropriate vaccines, testing, screening exams. ALL questions were answered to his satisfaction before leaving the office. The patient actively participated in medical decision making.         FOLLOW UP:     Patient knows to keep any and all future visits scheduled unless told otherwise. Patient knows to call, come back if any concerns, questions, comments or problems arise. Follow-up and Dispositions    · Return in about 3 months (around 8/12/2021) for follow up with DR Cristy Marshall for diabetes, schizophrenia and tremor. Follow-up and Disposition History                         This visit may have been completed , in part, with voice recognition software as well as typing and may have syntax errors despite editing.       Addy Lopez, DO

## 2021-05-20 ENCOUNTER — TELEPHONE (OUTPATIENT)
Dept: FAMILY MEDICINE CLINIC | Age: 56
End: 2021-05-20

## 2021-05-20 DIAGNOSIS — F20.9 SCHIZOPHRENIA, UNSPECIFIED TYPE (HCC): Primary | ICD-10-CM

## 2021-05-20 NOTE — TELEPHONE ENCOUNTER
Pts brother left message. States that you had ordered lab work for pt, but there was no [place] lab done. Asking if you can add that to his orders? I don't see that any orders were put in.

## 2021-05-20 NOTE — TELEPHONE ENCOUNTER
Apparently with the upgrade yesterday it shows different. You did order some blood work when he was here for his Haldol injection on 5/12/21. (Have to scroll down to under the \"Unknown date\". But there is not a Lithium order done.

## 2021-05-21 NOTE — TELEPHONE ENCOUNTER
Identifying Data:  60 y.o. , [facility] T Candi , male     HISTORICAL DATA (REVIEWED TODAY):  ALLERGIES-    No Known Allergies    MEDICATION AS OF LAST RECONCILIATION (NOT INCLUDING CHANGES MADE TODAY):    Current Outpatient Medications on File Prior to Visit   Medication Sig Dispense Refill    haloperidol decanoate (HALDOL DECANOATE) 100 mg/mL injection 1 mL by IntraMUSCular route every twenty-eight (28) days. 1 mL 0    benztropine (COGENTIN) 1 mg tablet Take 1 mg by mouth two (2) times a day.  buPROPion XL (WELLBUTRIN XL) 150 mg tablet Take 1 Tab by mouth daily.  haloperidoL (HALDOL) 5 mg tablet Take 1 Tab by mouth daily.  lithium carbonate 300 mg tablet Take 1 Tab by mouth three (3) times daily.  temazepam (RESTORIL) 30 mg capsule Take 1 Cap by mouth nightly.        Current Facility-Administered Medications on File Prior to Visit   Medication Dose Route Frequency Provider Last Rate Last Admin    haloperidol decanoate (HALDOL DECANOATE) 100 mg/mL LA injection 100 mg  100 mg IntraMUSCular Q28D Dinh Charles, NP   100 mg at 05/12/21 1453    haloperidol decanoate (HALDOL DECANOATE) 100 mg/mL LA injection 100 mg  100 mg IntraMUSCular Q28D Aaliyah Goes, DO   100 mg at 04/28/21 1517    haloperidol decanoate (HALDOL DECANOATE) 100 mg/mL LA injection 100 mg  100 mg IntraMUSCular Q28D Aaliyah Goes, DO   100 mg at 02/17/21 1636    haloperidol decanoate (HALDOL DECANOATE) 100 mg/mL LA injection 100 mg  100 mg IntraMUSCular Q28D Aaliyah Goes, DO        haloperidol decanoate (HALDOL DECANOATE) 100 mg/mL LA injection 100 mg  100 mg IntraMUSCular Q28D Aaliyah Goes, DO   100 mg at 05/12/21 1218    haloperidol decanoate (HALDOL DECANOATE) 100 mg/mL LA injection 100 mg  100 mg IntraMUSCular Q28D Tao Tripp, NP   100 mg at 01/06/21 1507    haloperidol decanoate (HALDOL DECANOATE) 100 mg/mL LA injection 100 mg  100 mg IntraMUSCular Q28D Aaliyah Goes, DO           PAST MEDICAL HISTORY:    Patient Active Problem List Diagnosis Code    Schizophrenia ([facility] F20.9    Tremor R25.1    Type 2 diabetes mellitus without complication ([facility] M34.4    Memory impairment R41.3       ASSESSMENT AND PLAN:      ICD-10-CM ICD-9-CM    1. Schizophrenia, unspecified type ([facility]  F20.9 295.90 LITHIUM             Edgar Luna DO

## 2021-05-25 LAB
ALBUMIN SERPL-MCNC: 4.1 G/DL (ref 3.8–4.9)
ALBUMIN/GLOB SERPL: 2 {RATIO} (ref 1.2–2.2)
ALP SERPL-CCNC: 70 IU/L (ref 48–121)
ALT SERPL-CCNC: 13 IU/L (ref 0–44)
AST SERPL-CCNC: 15 IU/L (ref 0–40)
BASOPHILS # BLD AUTO: 0 X10E3/UL (ref 0–0.2)
BASOPHILS NFR BLD AUTO: 1 %
BILIRUB SERPL-MCNC: 0.3 MG/DL (ref 0–1.2)
BUN SERPL-MCNC: 15 MG/DL (ref 6–24)
BUN/CREAT SERPL: 15 (ref 9–20)
CALCIUM SERPL-MCNC: 9.2 MG/DL (ref 8.7–10.2)
CHLORIDE SERPL-SCNC: 108 MMOL/L (ref 96–106)
CHOLEST SERPL-MCNC: 146 MG/DL (ref 100–199)
CO2 SERPL-SCNC: 22 MMOL/L (ref 20–29)
CREAT SERPL-MCNC: 1 MG/DL (ref 0.76–1.27)
EOSINOPHIL # BLD AUTO: 0.2 X10E3/UL (ref 0–0.4)
EOSINOPHIL NFR BLD AUTO: 3 %
ERYTHROCYTE [DISTWIDTH] IN BLOOD BY AUTOMATED COUNT: 12.8 % (ref 11.6–15.4)
EST. AVERAGE GLUCOSE BLD GHB EST-MCNC: 103 MG/DL
GLOBULIN SER CALC-MCNC: 2.1 G/DL (ref 1.5–4.5)
GLUCOSE SERPL-MCNC: 138 MG/DL (ref 65–99)
HBA1C MFR BLD: 5.2 % (ref 4.8–5.6)
HCT VFR BLD AUTO: 40.8 % (ref 37.5–51)
HDLC SERPL-MCNC: 44 MG/DL
HGB BLD-MCNC: 13.6 G/DL (ref 13–17.7)
IMM GRANULOCYTES # BLD AUTO: 0 X10E3/UL (ref 0–0.1)
IMM GRANULOCYTES NFR BLD AUTO: 1 %
LDLC SERPL CALC-MCNC: 87 MG/DL (ref 0–99)
LITHIUM SERPL-SCNC: 1.1 MMOL/L (ref 0.6–1.2)
LYMPHOCYTES # BLD AUTO: 1.1 X10E3/UL (ref 0.7–3.1)
LYMPHOCYTES NFR BLD AUTO: 21 %
MCH RBC QN AUTO: 30.1 PG (ref 26.6–33)
MCHC RBC AUTO-ENTMCNC: 33.3 G/DL (ref 31.5–35.7)
MCV RBC AUTO: 90 FL (ref 79–97)
MONOCYTES # BLD AUTO: 0.5 X10E3/UL (ref 0.1–0.9)
MONOCYTES NFR BLD AUTO: 8 %
NEUTROPHILS # BLD AUTO: 3.6 X10E3/UL (ref 1.4–7)
NEUTROPHILS NFR BLD AUTO: 66 %
PLATELET # BLD AUTO: 208 X10E3/UL (ref 150–450)
POTASSIUM SERPL-SCNC: 4.3 MMOL/L (ref 3.5–5.2)
PROT SERPL-MCNC: 6.2 G/DL (ref 6–8.5)
RBC # BLD AUTO: 4.52 X10E6/UL (ref 4.14–5.8)
SODIUM SERPL-SCNC: 142 MMOL/L (ref 134–144)
TRIGL SERPL-MCNC: 75 MG/DL (ref 0–149)
TSH SERPL DL<=0.005 MIU/L-ACNC: 1.44 UIU/ML (ref 0.45–4.5)
VLDLC SERPL CALC-MCNC: 15 MG/DL (ref 5–40)
WBC # BLD AUTO: 5.4 X10E3/UL (ref 3.4–10.8)

## 2021-05-26 ENCOUNTER — CLINICAL SUPPORT (OUTPATIENT)
Dept: FAMILY MEDICINE CLINIC | Age: 56
End: 2021-05-26
Payer: MEDICARE

## 2021-05-26 VITALS
OXYGEN SATURATION: 96 % | TEMPERATURE: 98 F | HEART RATE: 80 BPM | DIASTOLIC BLOOD PRESSURE: 78 MMHG | WEIGHT: 151 LBS | RESPIRATION RATE: 18 BRPM | HEIGHT: 65 IN | SYSTOLIC BLOOD PRESSURE: 123 MMHG | BODY MASS INDEX: 25.16 KG/M2

## 2021-05-26 DIAGNOSIS — F20.9 SCHIZOPHRENIA, UNSPECIFIED TYPE (HCC): Primary | ICD-10-CM

## 2021-05-26 RX ORDER — HALOPERIDOL DECANOATE 100 MG/ML
100 INJECTION INTRAMUSCULAR
Status: DISCONTINUED | OUTPATIENT
Start: 2021-05-26 | End: 2021-09-10

## 2021-05-26 RX ADMIN — HALOPERIDOL DECANOATE 100 MG: 100 INJECTION INTRAMUSCULAR at 15:33

## 2021-05-26 NOTE — PROGRESS NOTES
Chief Complaint   Patient presents with    Immunization/Injection     haldol injection      1. Have you been to the ER, urgent care clinic since your last visit? Hospitalized since your last visit? No    2. Have you seen or consulted any other health care providers outside of the [address] [place] [address] since your last visit? Include any pap smears or colon screening.  No     Visit Vitals  /78 (BP 1 Location: Right arm, BP Patient Position: Sitting, BP Cuff Size: Adult)   Pulse 80   Temp 98 °F (36.7 °C) (Temporal)   Resp 18   Ht 5' 5\" (1.651 m)   Wt 151 lb (68.5 kg)   SpO2 96%   BMI 25.13 kg/m²

## 2021-05-28 ENCOUNTER — OFFICE VISIT (OUTPATIENT)
Dept: FAMILY MEDICINE CLINIC | Age: 56
End: 2021-05-28
Payer: MEDICARE

## 2021-05-28 VITALS
OXYGEN SATURATION: 97 % | HEART RATE: 67 BPM | DIASTOLIC BLOOD PRESSURE: 72 MMHG | WEIGHT: 148 LBS | SYSTOLIC BLOOD PRESSURE: 102 MMHG | BODY MASS INDEX: 23.23 KG/M2 | HEIGHT: 67 IN | TEMPERATURE: 99.1 F | RESPIRATION RATE: 16 BRPM

## 2021-05-28 DIAGNOSIS — E11.9 TYPE 2 DIABETES MELLITUS WITHOUT COMPLICATION, WITHOUT LONG-TERM CURRENT USE OF INSULIN (HCC): ICD-10-CM

## 2021-05-28 DIAGNOSIS — M10.9 ACUTE GOUT OF FOOT, UNSPECIFIED CAUSE, UNSPECIFIED LATERALITY: Primary | ICD-10-CM

## 2021-05-28 DIAGNOSIS — F20.9 SCHIZOPHRENIA, UNSPECIFIED TYPE (HCC): ICD-10-CM

## 2021-05-28 PROCEDURE — 3044F HG A1C LEVEL LT 7.0%: CPT | Performed by: NURSE PRACTITIONER

## 2021-05-28 PROCEDURE — 2022F DILAT RTA XM EVC RTNOPTHY: CPT | Performed by: NURSE PRACTITIONER

## 2021-05-28 PROCEDURE — G8420 CALC BMI NORM PARAMETERS: HCPCS | Performed by: NURSE PRACTITIONER

## 2021-05-28 PROCEDURE — 3017F COLORECTAL CA SCREEN DOC REV: CPT | Performed by: NURSE PRACTITIONER

## 2021-05-28 PROCEDURE — G8432 DEP SCR NOT DOC, RNG: HCPCS | Performed by: NURSE PRACTITIONER

## 2021-05-28 PROCEDURE — 99213 OFFICE O/P EST LOW 20 MIN: CPT | Performed by: NURSE PRACTITIONER

## 2021-05-28 PROCEDURE — G8427 DOCREV CUR MEDS BY ELIG CLIN: HCPCS | Performed by: NURSE PRACTITIONER

## 2021-05-28 RX ORDER — RISPERIDONE 3 MG/1
3 TABLET, FILM COATED ORAL 2 TIMES DAILY
COMMUNITY
End: 2021-07-13 | Stop reason: SDUPTHER

## 2021-05-28 RX ORDER — FLUOXETINE HYDROCHLORIDE 20 MG/1
40 CAPSULE ORAL DAILY
COMMUNITY
End: 2021-09-10

## 2021-05-28 RX ORDER — PREDNISONE 5 MG/1
5 TABLET ORAL 2 TIMES DAILY
Qty: 20 TABLET | Refills: 0 | Status: SHIPPED | OUTPATIENT
Start: 2021-05-28 | End: 2021-07-13 | Stop reason: ALTCHOICE

## 2021-05-28 RX ORDER — INDOMETHACIN 75 MG/1
75 CAPSULE, EXTENDED RELEASE ORAL DAILY
Qty: 30 CAPSULE | Refills: 2 | Status: SHIPPED | OUTPATIENT
Start: 2021-05-28 | End: 2021-07-13 | Stop reason: ALTCHOICE

## 2021-05-28 RX ORDER — COLCHICINE 0.6 MG/1
TABLET ORAL
Qty: 3 TABLET | Refills: 0 | Status: SHIPPED | OUTPATIENT
Start: 2021-05-28 | End: 2021-07-13 | Stop reason: ALTCHOICE

## 2021-05-28 NOTE — PROGRESS NOTES
Shirley Christopher (: 1965) is a 60 y.o. male, established patient, here for evaluation of the following chief complaint(s): Foot Swelling       ASSESSMENT/PLAN:  Below is the assessment and plan developed based on review of pertinent history, physical exam, labs, studies, and medications. 1. Acute gout of foot, unspecified cause, unspecified laterality  -     indomethacin SR (INDOCIN SR) 75 mg SR capsule; Take 1 Capsule by mouth daily for 90 days. , Normal, Disp-30 Capsule, R-2  -     predniSONE (DELTASONE) 5 mg tablet; Take 1 Tablet by mouth two (2) times a day., Normal, Disp-20 Tablet, R-0  -     colchicine 0.6 mg tablet; Take 2 tablets (1.2 mg) now and then 1 tablet (0.6 mg) in 1 hour by mouth, Normal, Disp-3 Tablet, R-0  -     URIC ACID  2. Schizophrenia, unspecified type ([facility]  3. Type 2 diabetes mellitus without complication, without long-term current use of insulin ([facility])      No follow-ups on file. SUBJECTIVE/OBJECTIVE:  HPI  Foot Swelling (x 2 days - no known injury - seems to be slightly better today ) caretaker reports difficulty walking and tenderness with exertion. Does report increased in dietary salty foods over the past few days. No history of gout prior to this episode. No weakness noted. Review of Systems   All other systems reviewed and are negative. Visit Vitals  /72 (BP 1 Location: Left arm, BP Patient Position: Sitting, BP Cuff Size: Adult)   Pulse 67   Temp 99.1 °F (37.3 °C) (Temporal)   Resp 16   Ht 5' 7\" (1.702 m)   Wt 148 lb (67.1 kg)   SpO2 97%   BMI 23.18 kg/m²         Physical Exam  Vitals and nursing note reviewed. Exam conducted with a chaperone present (Caretaker present during visit). Constitutional:       Appearance: Normal appearance. He is normal weight. Cardiovascular:      Pulses: Normal pulses. Musculoskeletal:         General: Swelling and tenderness present. Right lower leg: Edema present. Left lower leg: Edema present.    Skin:     General: Skin is warm and dry. Neurological:      Mental Status: He is alert. Mental status is at baseline. Psychiatric:         Behavior: Behavior normal.       On this date 05/28/2021 I have spent 21 minutes reviewing previous notes, test results and face to face with the patient discussing the diagnosis and importance of compliance with the treatment plan as well as documenting on the day of the visit. An electronic signature was used to authenticate this note.   -- Mima Osorio NP

## 2021-05-28 NOTE — PROGRESS NOTES
Chief Complaint   Patient presents with    Foot Swelling     x 2 days - no known injury - seems to be slightly better today      1. Have you been to the ER, urgent care clinic since your last visit? Hospitalized since your last visit? No    2. Have you seen or consulted any other health care providers outside of the [address] [place] [address] since your last visit? Include any pap smears or colon screening. No  Dr. Rubi Else were completed.      Visit Vitals  /72 (BP 1 Location: Left arm, BP Patient Position: Sitting, BP Cuff Size: Adult)   Pulse 67   Temp 99.1 °F (37.3 °C) (Temporal)   Resp 16   Ht 5' 7\" (1.702 m)   Wt 148 lb (67.1 kg)   SpO2 97%   BMI 23.18 kg/m²

## 2021-05-29 LAB — URATE SERPL-MCNC: 6.5 MG/DL (ref 3.8–8.4)

## 2021-05-29 NOTE — PROGRESS NOTES
Uric acid okay continue with medication the proximal prescribed on the visit to help alleviate the swelling of the feet

## 2021-06-02 NOTE — PROGRESS NOTES
Called  and spoke to patient's brother / care giver. Patient is doing better. He will continue his medication as prescribed and follow up as needed.

## 2021-06-03 NOTE — PROGRESS NOTES
Count is normal without anemia or infection. The sugar is 138. Cholesterol is great. A1c is 5.2%. That is good.   Thyroid is normal.

## 2021-06-09 ENCOUNTER — APPOINTMENT (OUTPATIENT)
Dept: GENERAL RADIOLOGY | Age: 56
DRG: 556 | End: 2021-06-09
Attending: EMERGENCY MEDICINE
Payer: MEDICARE

## 2021-06-09 ENCOUNTER — APPOINTMENT (OUTPATIENT)
Dept: CT IMAGING | Age: 56
DRG: 556 | End: 2021-06-09
Attending: EMERGENCY MEDICINE
Payer: MEDICARE

## 2021-06-09 ENCOUNTER — HOSPITAL ENCOUNTER (INPATIENT)
Age: 56
LOS: 6 days | Discharge: HOME HEALTH | DRG: 556 | End: 2021-06-16
Attending: EMERGENCY MEDICINE | Admitting: HOSPITALIST
Payer: MEDICARE

## 2021-06-09 DIAGNOSIS — R26.9 GAIT DISTURBANCE: Primary | ICD-10-CM

## 2021-06-09 DIAGNOSIS — Z86.59 HISTORY OF SCHIZOPHRENIA: ICD-10-CM

## 2021-06-09 PROCEDURE — 85025 COMPLETE CBC W/AUTO DIFF WBC: CPT

## 2021-06-09 PROCEDURE — 80143 DRUG ASSAY ACETAMINOPHEN: CPT

## 2021-06-09 PROCEDURE — 86140 C-REACTIVE PROTEIN: CPT

## 2021-06-09 PROCEDURE — 84550 ASSAY OF BLOOD/URIC ACID: CPT

## 2021-06-09 PROCEDURE — 82550 ASSAY OF CK (CPK): CPT

## 2021-06-09 PROCEDURE — 71045 X-RAY EXAM CHEST 1 VIEW: CPT

## 2021-06-09 PROCEDURE — 36415 COLL VENOUS BLD VENIPUNCTURE: CPT

## 2021-06-09 PROCEDURE — 93005 ELECTROCARDIOGRAM TRACING: CPT

## 2021-06-09 PROCEDURE — 99282 EMERGENCY DEPT VISIT SF MDM: CPT

## 2021-06-09 PROCEDURE — 80179 DRUG ASSAY SALICYLATE: CPT

## 2021-06-09 PROCEDURE — 83735 ASSAY OF MAGNESIUM: CPT

## 2021-06-09 PROCEDURE — 80053 COMPREHEN METABOLIC PANEL: CPT

## 2021-06-09 PROCEDURE — 84484 ASSAY OF TROPONIN QUANT: CPT

## 2021-06-09 PROCEDURE — 70450 CT HEAD/BRAIN W/O DYE: CPT

## 2021-06-10 PROBLEM — R26.9 GAIT DISTURBANCE: Status: ACTIVE | Noted: 2021-06-10

## 2021-06-10 LAB
25(OH)D3 SERPL-MCNC: 17.7 NG/ML (ref 30–100)
ALBUMIN SERPL-MCNC: 3.2 G/DL (ref 3.5–5)
ALBUMIN SERPL-MCNC: 3.7 G/DL (ref 3.5–5)
ALBUMIN/GLOB SERPL: 1.2 {RATIO} (ref 1.1–2.2)
ALP SERPL-CCNC: 74 U/L (ref 45–117)
ALT SERPL-CCNC: 24 U/L (ref 12–78)
AMMONIA PLAS-SCNC: 25 UMOL/L
ANION GAP SERPL CALC-SCNC: 5 MMOL/L (ref 5–15)
ANION GAP SERPL CALC-SCNC: 6 MMOL/L (ref 5–15)
APAP SERPL-MCNC: <10 UG/ML (ref 10–30)
AST SERPL W P-5'-P-CCNC: 15 U/L (ref 15–37)
ATRIAL RATE: 63 BPM
BASOPHILS # BLD: 0.1 K/UL (ref 0–0.1)
BASOPHILS NFR BLD: 1 % (ref 0–1)
BILIRUB SERPL-MCNC: 0.2 MG/DL (ref 0.2–1)
BUN SERPL-MCNC: 16 MG/DL (ref 6–20)
BUN SERPL-MCNC: 21 MG/DL (ref 6–20)
BUN/CREAT SERPL: 15 (ref 12–20)
BUN/CREAT SERPL: 19 (ref 12–20)
CA-I BLD-MCNC: 1.14 MMOL/L (ref 0.95–1.35)
CA-I BLD-MCNC: 8.5 MG/DL (ref 8.5–10.1)
CA-I BLD-MCNC: 8.6 MG/DL (ref 8.5–10.1)
CALCULATED P AXIS, ECG09: 41 DEGREES
CALCULATED R AXIS, ECG10: -4 DEGREES
CHLORIDE SERPL-SCNC: 107 MMOL/L (ref 97–108)
CHLORIDE SERPL-SCNC: 109 MMOL/L (ref 97–108)
CK SERPL-CCNC: 368 U/L (ref 39–308)
CO2 SERPL-SCNC: 24 MMOL/L (ref 21–32)
CO2 SERPL-SCNC: 25 MMOL/L (ref 21–32)
CREAT SERPL-MCNC: 1.04 MG/DL (ref 0.7–1.3)
CREAT SERPL-MCNC: 1.1 MG/DL (ref 0.7–1.3)
CRP SERPL-MCNC: <0.29 MG/DL (ref 0–0.6)
DATE LAST DOSE: ABNORMAL
DATE LAST DOSE: ABNORMAL
DATE LAST DOSE: NORMAL
DIAGNOSIS, 93000: NORMAL
DIFFERENTIAL METHOD BLD: ABNORMAL
EOSINOPHIL # BLD: 0.2 K/UL (ref 0–0.4)
EOSINOPHIL NFR BLD: 3 % (ref 0–7)
ERYTHROCYTE [DISTWIDTH] IN BLOOD BY AUTOMATED COUNT: 12.7 % (ref 11.5–14.5)
GLOBULIN SER CALC-MCNC: 3.1 G/DL (ref 2–4)
GLUCOSE SERPL-MCNC: 101 MG/DL (ref 65–100)
GLUCOSE SERPL-MCNC: 94 MG/DL (ref 65–100)
HCT VFR BLD AUTO: 42 % (ref 36.6–50.3)
HGB BLD-MCNC: 13.6 G/DL (ref 12.1–17)
IMM GRANULOCYTES # BLD AUTO: 0.1 K/UL (ref 0–0.04)
IMM GRANULOCYTES NFR BLD AUTO: 2 % (ref 0–0.5)
LITHIUM SERPL-SCNC: 1.2 MMOL/L (ref 0.6–1.2)
LYMPHOCYTES # BLD: 1.5 K/UL (ref 0.8–3.5)
LYMPHOCYTES NFR BLD: 16 % (ref 12–49)
MAGNESIUM SERPL-MCNC: 2 MG/DL (ref 1.6–2.4)
MAGNESIUM SERPL-MCNC: 2 MG/DL (ref 1.6–2.4)
MCH RBC QN AUTO: 30 PG (ref 26–34)
MCHC RBC AUTO-ENTMCNC: 32.4 G/DL (ref 30–36.5)
MCV RBC AUTO: 92.5 FL (ref 80–99)
MONOCYTES # BLD: 0.9 K/UL (ref 0–1)
MONOCYTES NFR BLD: 10 % (ref 5–13)
NEUTS SEG # BLD: 6.2 K/UL (ref 1.8–8)
NEUTS SEG NFR BLD: 68 % (ref 32–75)
NRBC # BLD: 0 K/UL (ref 0–0.01)
NRBC BLD-RTO: 0 PER 100 WBC
P-R INTERVAL, ECG05: 196 MS
PHOSPHATE SERPL-MCNC: 3.2 MG/DL (ref 2.6–4.7)
PLATELET # BLD AUTO: 224 K/UL (ref 150–400)
PMV BLD AUTO: 10.9 FL (ref 8.9–12.9)
POTASSIUM SERPL-SCNC: 3.6 MMOL/L (ref 3.5–5.1)
POTASSIUM SERPL-SCNC: 3.8 MMOL/L (ref 3.5–5.1)
PROT SERPL-MCNC: 6.8 G/DL (ref 6.4–8.2)
Q-T INTERVAL, ECG07: 432 MS
QRS DURATION, ECG06: 106 MS
QTC CALCULATION (BEZET), ECG08: 442 MS
RBC # BLD AUTO: 4.54 M/UL (ref 4.1–5.7)
REPORTED DOSE,DOSE: ABNORMAL UNITS
REPORTED DOSE,DOSE: ABNORMAL UNITS
REPORTED DOSE,DOSE: NORMAL UNITS
SALICYLATES SERPL-MCNC: <1.7 MG/DL (ref 2.8–20)
SODIUM SERPL-SCNC: 138 MMOL/L (ref 136–145)
SODIUM SERPL-SCNC: 138 MMOL/L (ref 136–145)
TROPONIN I SERPL-MCNC: <0.05 NG/ML
TSH SERPL DL<=0.05 MIU/L-ACNC: 3.4 UIU/ML (ref 0.36–3.74)
URATE SERPL-MCNC: 7.5 MG/DL (ref 3.5–7.2)
VENTRICULAR RATE, ECG03: 63 BPM
WBC # BLD AUTO: 8.9 K/UL (ref 4.1–11.1)

## 2021-06-10 PROCEDURE — 65270000029 HC RM PRIVATE

## 2021-06-10 PROCEDURE — 82140 ASSAY OF AMMONIA: CPT

## 2021-06-10 PROCEDURE — 80178 ASSAY OF LITHIUM: CPT

## 2021-06-10 PROCEDURE — 94760 N-INVAS EAR/PLS OXIMETRY 1: CPT

## 2021-06-10 PROCEDURE — 74011250636 HC RX REV CODE- 250/636: Performed by: HOSPITALIST

## 2021-06-10 PROCEDURE — 83735 ASSAY OF MAGNESIUM: CPT

## 2021-06-10 PROCEDURE — 80069 RENAL FUNCTION PANEL: CPT

## 2021-06-10 PROCEDURE — 80179 DRUG ASSAY SALICYLATE: CPT

## 2021-06-10 PROCEDURE — 82330 ASSAY OF CALCIUM: CPT

## 2021-06-10 PROCEDURE — 80143 DRUG ASSAY ACETAMINOPHEN: CPT

## 2021-06-10 PROCEDURE — 82306 VITAMIN D 25 HYDROXY: CPT

## 2021-06-10 PROCEDURE — 36415 COLL VENOUS BLD VENIPUNCTURE: CPT

## 2021-06-10 PROCEDURE — 74011250637 HC RX REV CODE- 250/637: Performed by: HOSPITALIST

## 2021-06-10 PROCEDURE — 84443 ASSAY THYROID STIM HORMONE: CPT

## 2021-06-10 RX ORDER — BUPROPION HYDROCHLORIDE 150 MG/1
150 TABLET ORAL EVERY EVENING
COMMUNITY
End: 2021-07-13 | Stop reason: SDUPTHER

## 2021-06-10 RX ORDER — BUPROPION HYDROCHLORIDE 150 MG/1
150 TABLET ORAL EVERY EVENING
Status: DISCONTINUED | OUTPATIENT
Start: 2021-06-10 | End: 2021-06-10

## 2021-06-10 RX ORDER — FLUOXETINE 10 MG/1
20 CAPSULE ORAL DAILY
Status: DISCONTINUED | OUTPATIENT
Start: 2021-06-10 | End: 2021-06-10

## 2021-06-10 RX ORDER — SODIUM CHLORIDE 9 MG/ML
100 INJECTION, SOLUTION INTRAVENOUS CONTINUOUS
Status: DISCONTINUED | OUTPATIENT
Start: 2021-06-10 | End: 2021-06-16 | Stop reason: HOSPADM

## 2021-06-10 RX ORDER — RISPERIDONE 3 MG/1
3 TABLET, FILM COATED ORAL 2 TIMES DAILY
COMMUNITY
End: 2021-06-16

## 2021-06-10 RX ORDER — FLUOXETINE HYDROCHLORIDE 20 MG/1
20 CAPSULE ORAL
Status: DISCONTINUED | OUTPATIENT
Start: 2021-06-10 | End: 2021-06-12

## 2021-06-10 RX ORDER — FLUOXETINE HYDROCHLORIDE 20 MG/1
40 CAPSULE ORAL EVERY MORNING
Status: DISCONTINUED | OUTPATIENT
Start: 2021-06-10 | End: 2021-06-12

## 2021-06-10 RX ORDER — ONDANSETRON 4 MG/1
4 TABLET, ORALLY DISINTEGRATING ORAL
Status: DISCONTINUED | OUTPATIENT
Start: 2021-06-10 | End: 2021-06-16 | Stop reason: HOSPADM

## 2021-06-10 RX ORDER — BENZTROPINE MESYLATE 1 MG/1
1 TABLET ORAL 2 TIMES DAILY
Status: DISCONTINUED | OUTPATIENT
Start: 2021-06-10 | End: 2021-06-16 | Stop reason: HOSPADM

## 2021-06-10 RX ORDER — SODIUM CHLORIDE 0.9 % (FLUSH) 0.9 %
5-40 SYRINGE (ML) INJECTION AS NEEDED
Status: DISCONTINUED | OUTPATIENT
Start: 2021-06-10 | End: 2021-06-16 | Stop reason: HOSPADM

## 2021-06-10 RX ORDER — BENZTROPINE MESYLATE 1 MG/1
1 TABLET ORAL 2 TIMES DAILY
Status: DISCONTINUED | OUTPATIENT
Start: 2021-06-10 | End: 2021-06-10

## 2021-06-10 RX ORDER — ACETAMINOPHEN 650 MG/1
650 SUPPOSITORY RECTAL
Status: DISCONTINUED | OUTPATIENT
Start: 2021-06-10 | End: 2021-06-16 | Stop reason: HOSPADM

## 2021-06-10 RX ORDER — RISPERIDONE 1 MG/1
3 TABLET, FILM COATED ORAL 2 TIMES DAILY
Status: DISCONTINUED | OUTPATIENT
Start: 2021-06-10 | End: 2021-06-10

## 2021-06-10 RX ORDER — BUPROPION HYDROCHLORIDE 150 MG/1
150 TABLET ORAL EVERY EVENING
Status: DISCONTINUED | OUTPATIENT
Start: 2021-06-10 | End: 2021-06-16 | Stop reason: HOSPADM

## 2021-06-10 RX ORDER — LITHIUM CARBONATE 300 MG
300 TABLET ORAL 3 TIMES DAILY
Status: DISCONTINUED | OUTPATIENT
Start: 2021-06-10 | End: 2021-06-10

## 2021-06-10 RX ORDER — ACETAMINOPHEN 325 MG/1
650 TABLET ORAL
Status: DISCONTINUED | OUTPATIENT
Start: 2021-06-10 | End: 2021-06-16 | Stop reason: HOSPADM

## 2021-06-10 RX ORDER — LITHIUM CARBONATE 300 MG
300 TABLET ORAL 3 TIMES DAILY
COMMUNITY
End: 2021-06-16

## 2021-06-10 RX ORDER — ONDANSETRON 2 MG/ML
4 INJECTION INTRAMUSCULAR; INTRAVENOUS
Status: DISCONTINUED | OUTPATIENT
Start: 2021-06-10 | End: 2021-06-16 | Stop reason: HOSPADM

## 2021-06-10 RX ORDER — SODIUM CHLORIDE 0.9 % (FLUSH) 0.9 %
5-40 SYRINGE (ML) INJECTION EVERY 8 HOURS
Status: DISCONTINUED | OUTPATIENT
Start: 2021-06-10 | End: 2021-06-13

## 2021-06-10 RX ORDER — LITHIUM CARBONATE 300 MG
300 TABLET ORAL 3 TIMES DAILY
Status: DISCONTINUED | OUTPATIENT
Start: 2021-06-10 | End: 2021-06-12

## 2021-06-10 RX ORDER — BENZTROPINE MESYLATE 1 MG/1
1 TABLET ORAL 2 TIMES DAILY
COMMUNITY
End: 2021-07-13 | Stop reason: SDUPTHER

## 2021-06-10 RX ORDER — FLUOXETINE HYDROCHLORIDE 20 MG/1
20 CAPSULE ORAL 3 TIMES DAILY
COMMUNITY
End: 2021-07-13 | Stop reason: ALTCHOICE

## 2021-06-10 RX ADMIN — SODIUM CHLORIDE 100 ML/HR: 9 INJECTION, SOLUTION INTRAVENOUS at 08:30

## 2021-06-10 RX ADMIN — Medication 10 ML: at 14:50

## 2021-06-10 RX ADMIN — Medication 10 ML: at 08:30

## 2021-06-10 RX ADMIN — RISPERIDONE 3 MG: 2 TABLET ORAL at 21:47

## 2021-06-10 RX ADMIN — BUPROPION HYDROCHLORIDE 150 MG: 150 TABLET, FILM COATED, EXTENDED RELEASE ORAL at 18:56

## 2021-06-10 RX ADMIN — BENZTROPINE MESYLATE 1 MG: 1 TABLET ORAL at 08:38

## 2021-06-10 RX ADMIN — LITHIUM CARBONATE 300 MG: 300 TABLET ORAL at 18:56

## 2021-06-10 RX ADMIN — Medication 10 ML: at 21:45

## 2021-06-10 RX ADMIN — FLUOXETINE 40 MG: 20 CAPSULE ORAL at 08:38

## 2021-06-10 RX ADMIN — LITHIUM CARBONATE 300 MG: 300 TABLET ORAL at 08:38

## 2021-06-10 RX ADMIN — RISPERIDONE 3 MG: 2 TABLET ORAL at 08:38

## 2021-06-10 RX ADMIN — FLUOXETINE 20 MG: 20 CAPSULE ORAL at 21:46

## 2021-06-10 RX ADMIN — BENZTROPINE MESYLATE 1 MG: 1 TABLET ORAL at 21:44

## 2021-06-10 NOTE — H&P
History and Physical              Subjective :   Chief Complaint : Difficulty ambulation    Source of information : Father at bedside. Patient unable to provide information due to intellectual deficits    History of present illness:   57-year-old male with history of schizophrenia and intellectual disability under supervised care of father is brought to the emergency room complaining of difficulty ambulation with muscle twitching some tremors of the extremities. Father states that he was doing fairly well until 2 weeks ago, recently developed swelling of both feet with erythema. Was evaluated outpatient and given indomethacin and prednisone. The swelling is improved but then he started having the tremors and twitchings of the muscles, he was unable to ambulate. Previously he was ambulating fine at home also climbing up to the stairs, but now he was unable to keep steady pace. Has significant tremors of both upper and lower extremities with stiffness. Denies any fever, chills. His appetite is fair eating well. No nausea or vomiting. He did not notice any trouble with breathing. Evaluation in the emergency room also found with patient not able to ambulate steadily, admitted for further evaluation. Past Medical History:   Diagnosis Date    Intellectual disability     Schizophrenia ([facility].)      History reviewed. No pertinent surgical history. Family History   Problem Relation Age of Onset    No Known Problems Mother     No Known Problems Father       Social History     Tobacco Use    Smoking status: Never Smoker    Smokeless tobacco: Never Used   Substance Use Topics    Alcohol use: Never       Prior to Admission medications    Medication Sig Start Date End Date Taking? Authorizing Provider   benztropine (COGENTIN) 1 mg tablet Take 1 mg by mouth two (2) times a day. Yes Provider, Historical   buPROPion XL (WELLBUTRIN XL) 150 mg tablet Take 150 mg by mouth every evening.    Yes Provider, Historical FLUoxetine (PROzac) 20 mg capsule Take 20 mg by mouth three (3) times daily. Yes Provider, Historical   risperiDONE (RisperDAL) 3 mg tablet Take 3 mg by mouth two (2) times a day. Yes Provider, Historical   lithium carbonate 300 mg tablet Take 300 mg by mouth three (3) times daily. Yes Provider, Historical     No Known Allergies          Review of Systems: As in history of present illness for the father, patient unable to provide any information. Vitals:     Patient Vitals for the past 12 hrs:   Temp Pulse Resp BP SpO2   06/09/21 2225 98.4 °F (36.9 °C) 70 18 127/80 97 %       Physical Exam:   General : Looks comfortable, no respiratory distress noted. HEENT : PERRLA, normal oral mucosa, atraumatic normocephalic, Normal ear and nose. Neck : Supple, no JVD, no masses noted, no carotid bruit. Lungs : Breath sounds with good air entry bilaterally, no wheezes or rales, no accessory muscle use. CVS : Rhythm rate regular, S1+, S2+, no murmur or gallop. Abdomen : Soft, nontender, no organomegaly, bowel sounds active. Extremities : No edema noted,  pedal pulses palpable. Musculoskeletal : Fair range of motion, noticed to have some stiffness in both upper and lower extremities, muscle twitching's are noted. He was not able to walk steadily from bed to the door, felt like he is going to fall. Skin : Moist, warm, no pathological rash. Lymphatic : No cervical lymphadenopathy. Neurological : Awake, alert, oriented to  place person. He has a staggering gait. Was wobbly when trying to walk. Notices significant tremors of both upper and lower extremities worse in the upper extremities. Psychiatric : He is childish and pleasant. No abnormal behavior noted.       Data Review:   Recent Results (from the past 24 hour(s))   CBC WITH AUTOMATED DIFF    Collection Time: 06/09/21 11:00 PM   Result Value Ref Range    WBC 8.9 4.1 - 11.1 K/uL    RBC 4.54 4.10 - 5.70 M/uL    HGB 13.6 12.1 - 17.0 g/dL    HCT 42.0 36.6 - 50.3 %    MCV 92.5 80.0 - 99.0 FL    MCH 30.0 26.0 - 34.0 PG    MCHC 32.4 30.0 - 36.5 g/dL    RDW 12.7 11.5 - 14.5 %    PLATELET 372 523 - 420 K/uL    MPV 10.9 8.9 - 12.9 FL    NRBC 0.0 0.0  WBC    ABSOLUTE NRBC 0.00 0.00 - 0.01 K/uL    NEUTROPHILS 68 32 - 75 %    LYMPHOCYTES 16 12 - 49 %    MONOCYTES 10 5 - 13 %    EOSINOPHILS 3 0 - 7 %    BASOPHILS 1 0 - 1 %    IMMATURE GRANULOCYTES 2 (H) 0 - 0.5 %    ABS. NEUTROPHILS 6.2 1.8 - 8.0 K/UL    ABS. LYMPHOCYTES 1.5 0.8 - 3.5 K/UL    ABS. MONOCYTES 0.9 0.0 - 1.0 K/UL    ABS. EOSINOPHILS 0.2 0.0 - 0.4 K/UL    ABS. BASOPHILS 0.1 0.0 - 0.1 K/UL    ABS. IMM. GRANS. 0.1 (H) 0.00 - 0.04 K/UL    DF AUTOMATED     METABOLIC PANEL, COMPREHENSIVE    Collection Time: 06/09/21 11:00 PM   Result Value Ref Range    Sodium 138 136 - 145 mmol/L    Potassium 3.6 3.5 - 5.1 mmol/L    Chloride 107 97 - 108 mmol/L    CO2 25 21 - 32 mmol/L    Anion gap 6 5 - 15 mmol/L    Glucose 94 65 - 100 mg/dL    BUN 21 (H) 6 - 20 mg/dL    Creatinine 1.10 0.70 - 1.30 mg/dL    BUN/Creatinine ratio 19 12 - 20      GFR est AA >60 >60 ml/min/1.73m2    GFR est non-AA >60 >60 ml/min/1.73m2    Calcium 8.5 8.5 - 10.1 mg/dL    Bilirubin, total 0.2 0.2 - 1.0 mg/dL    AST (SGOT) 15 15 - 37 U/L    ALT (SGPT) 24 12 - 78 U/L    Alk.  phosphatase 74 45 - 117 U/L    Protein, total 6.8 6.4 - 8.2 g/dL    Albumin 3.7 3.5 - 5.0 g/dL    Globulin 3.1 2.0 - 4.0 g/dL    A-G Ratio 1.2 1.1 - 2.2     TROPONIN I    Collection Time: 06/09/21 11:00 PM   Result Value Ref Range    Troponin-I, Qt. <0.05 <0.05 ng/mL   MAGNESIUM    Collection Time: 06/09/21 11:00 PM   Result Value Ref Range    Magnesium 2.0 1.6 - 2.4 mg/dL   URIC ACID    Collection Time: 06/09/21 11:00 PM   Result Value Ref Range    Uric acid 7.5 (H) 3.5 - 7.2 mg/dL   CK    Collection Time: 06/09/21 11:00 PM   Result Value Ref Range     (H) 39 - 308 U/L   C REACTIVE PROTEIN, QT    Collection Time: 06/09/21 11:00 PM   Result Value Ref Range    C-Reactive protein <0.29 0.00 - 0.60 mg/dL   ACETAMINOPHEN    Collection Time: 06/10/21 12:28 AM   Result Value Ref Range    Acetaminophen level <10 (L) 10 - 30 ug/mL    Reported dose date Not provided      Reported dose: Not provided Units   SALICYLATE    Collection Time: 06/10/21 12:28 AM   Result Value Ref Range    Salicylate level <8.0 (L) 2.8 - 20.0 mg/dL    Reported dose date Not provided      Reported dose: Not provided Units       Radiologic Studies :   CT Results  (Last 48 hours)               06/09/21 2314  CT HEAD WO CONT Final result    Impression:  Mild senescent changes consistent with the patient's age. Stable. No acute or active intracranial process. Narrative:  PROCEDURE: CT HEAD WO CONT       HISTORY:Weakness       COMPARISON:Head CT 10 February 2020       Department policy stipulates all CT scans at this facility are performed using   dose reduction optimization techniques as appropriate to the performed exam,   including the following: Automated exposure control, adjustments of the mA   and/or KVP according to the patient size, and the use of iterative   reconstruction technique. TECHNIQUE: Without IV contrast       Bones/extracranial soft tissues:  Within normal limits   Extra-axial spaces:  No hemorrhage, mass or focal fluid collection. Ventricles/sulci:  There is mild dilatation of the ventricles. Sulci are   prominent. Brain parenchyma:   No mass effect. There is good gray-white differentiation. CXR Results  (Last 48 hours)               06/09/21 2245  XR CHEST PORT Final result    Impression:  No evidence of an acute cardiopulmonary process. Narrative:  XR CHEST PORT       Comparison:  None available       Single view: The lungs are clear. No pneumothorax or pleural effusion apparent. The heart size is normal. Calcified mediastinal lymph nodes are noted. There is   no evidence of acute cardiac decompensation.                    Assessment and Plan : New onset gait disturbance with muscle stiffness and tremors: Etiology unclear. A possibility of medication side effects versus progression of his condition from schizophrenia. But this acute onset is bothering the father and also both having evaluation. Will consult neurology and psychiatry for further management    Schizoaffective disorder: On multiple medications. I took liberty decrease her Prozac to once a day instead of 3 times a day. I think he may need to be adjusted in lithium dose also ordered for levels. Will follow with psychiatry recommendations    Recent history of pedal swelling and redness, improving, no signs of inflammation noted on evaluation. Mild elevation of the creatinine kinase suggestive of some mild musculoskeletal issues is a possibility. Will follow up on this. Admitted to medical floor, full CODE STATUS, patient cannot make any decisions with understanding, his father who is the power of  will make decisions. Home medications is reviewed by the patient's father who gave the information. CC : Davon Parikh, DO  Signed By: Lali Monsalve MD     [place] 10, 2021      This dictation was done by dragon, computer voice recognition software. Often unanticipated grammatical, syntax, [place] phones and other interpretive errors are inadvertently transcribed. Please excuse errors that have escaped final proofreading.

## 2021-06-10 NOTE — PROGRESS NOTES
6/10/21. CM attempted to interview pt. Pt responsive & understandable but unable to recall any information & possible some confusion. Pt stated he lives with  His brother, but could not recall name/number. Per nsg pt lives with his father. . Number listed called ( [phone]) vm full - unable to leave message. CM to f/u.

## 2021-06-10 NOTE — INTERDISCIPLINARY ROUNDS
Two person skin assessment done by Sophia Amaral RN and Bella Bourgeois LPN. Pts. Skin is clean, dry and intact. Patient has large bruise on left upper arm.

## 2021-06-10 NOTE — ROUTINE PROCESS
TRANSFER - OUT REPORT:    Verbal report given to Trudi(name) on [facility] Candi  being transferred to [facility](unit) for routine progression of care       Report consisted of patients Situation, Background, Assessment and   Recommendations(SBAR). Information from the following report(s) SBAR, ED Summary, [facility] and Recent Results was reviewed with the receiving nurse. Lines:   Peripheral IV 06/10/21 Anterior; Left Forearm (Active)        Opportunity for questions and clarification was provided.       Patient transported with:   Registered Nurse  Tech

## 2021-06-10 NOTE — PROGRESS NOTES
Hospitalist Progress Note               Daily Progress Note: 6/10/2021  60 m hx schizophrenia, psychosis, intellectual disability, anxiety & depressionpresents with gait difficulty. Tremors chronic, weakness relatively acute. Seen by neuro, doesn't feel central cns, suspect psych rx adverse effect. Psych cx'd. Subjective:   Seen at bedside in ED. He appears very tremulous, remains alert, denies pain. Problem List:  Problem List as of 6/10/2021 Date Reviewed: 6/10/2021        Codes Class Noted - Resolved    Gait disturbance ICD-10-CM: R26.9  ICD-9-CM: 781.2  6/10/2021 - Present              Medications reviewed  Current Facility-Administered Medications   Medication Dose Route Frequency    sodium chloride (NS) flush 5-40 mL  5-40 mL IntraVENous Q8H    sodium chloride (NS) flush 5-40 mL  5-40 mL IntraVENous PRN    acetaminophen (TYLENOL) tablet 650 mg  650 mg Oral Q6H PRN    Or    acetaminophen (TYLENOL) suppository 650 mg  650 mg Rectal Q6H PRN    ondansetron (ZOFRAN ODT) tablet 4 mg  4 mg Oral Q6H PRN    Or    ondansetron (ZOFRAN) injection 4 mg  4 mg IntraVENous Q6H PRN    0.9% sodium chloride infusion  100 mL/hr IntraVENous CONTINUOUS    benztropine (COGENTIN) tablet 1 mg  1 mg Oral BID    buPROPion XL (WELLBUTRIN XL) tablet 150 mg  150 mg Oral QPM    FLUoxetine (PROzac) capsule 40 mg  40 mg Oral QAM    FLUoxetine (PROzac) capsule 20 mg  20 mg Oral QHS    lithium carbonate tablet 300 mg  300 mg Oral TID    risperiDONE (RisperDAL) tablet 3 mg  3 mg Oral BID     Current Outpatient Medications   Medication Sig    benztropine (COGENTIN) 1 mg tablet Take 1 mg by mouth two (2) times a day.  buPROPion XL (WELLBUTRIN XL) 150 mg tablet Take 150 mg by mouth every evening.  FLUoxetine (PROzac) 20 mg capsule Take 20 mg by mouth three (3) times daily.  risperiDONE (RisperDAL) 3 mg tablet Take 3 mg by mouth two (2) times a day.     lithium carbonate 300 mg tablet Take 300 mg by mouth three (3) times daily. Review of Systems:   Review of Systems   Constitutional: Negative for chills, fever and malaise/fatigue. HENT: Negative. Eyes: Negative. Respiratory: Negative. Cardiovascular: Negative. Musculoskeletal: Negative for back pain, falls, joint pain, myalgias and neck pain. Skin: Negative. Neurological: Positive for tremors and weakness. Negative for dizziness, sensory change, speech change and focal weakness. Psychiatric/Behavioral: The patient is nervous/anxious. Objective:   Physical Exam:     Visit Vitals  /83 (BP Patient Position: At rest)   Pulse 68   Temp 98.2 °F (36.8 °C)   Resp 18   Ht 5' 5\" (1.651 m)   Wt 68 kg (150 lb)   SpO2 96%   BMI 24.96 kg/m²      O2 Device: None    Temp (24hrs), Av.3 °F (36.8 °C), Min:98.2 °F (36.8 °C), Max:98.4 °F (36.9 °C)    No intake/output data recorded. No intake/output data recorded. Data Review:       Recent Days:  Recent Labs     21  2300   WBC 8.9   HGB 13.6   HCT 42.0        Recent Labs     06/10/21  0845 06/10/21  0725 21  2300   NA  --  138 138   K  --  3.8 3.6   CL  --  109* 107   CO2  --  24 25   GLU  --  101* 94   BUN  --  16 21*   CREA  --  1.04 1.10   CA  --  8.6 8.5   MG 2.0  --  2.0   PHOS  --  3.2  --    ALB  --  3.2* 3.7   TBILI  --   --  0.2   ALT  --   --  24     No results for input(s): PH, PCO2, PO2, HCO3, FIO2 in the last 72 hours.     24 Hour Results:  Recent Results (from the past 24 hour(s))   EKG, 12 LEAD, INITIAL    Collection Time: 21 10:45 PM   Result Value Ref Range    Ventricular Rate 63 BPM    Atrial Rate 63 BPM    P-R Interval 196 ms    QRS Duration 106 ms    Q-T Interval 432 ms    QTC Calculation (Bezet) 442 ms    Calculated P Axis 41 degrees    Calculated R Axis -4 degrees    Diagnosis       Normal sinus rhythm  Nonspecific T wave abnormality  Abnormal ECG  No previous ECGs available  Confirmed by SACHA GARCIA ([id number]) on 6/10/2021 9:04:23 AM     CBC WITH AUTOMATED DIFF    Collection Time: 06/09/21 11:00 PM   Result Value Ref Range    WBC 8.9 4.1 - 11.1 K/uL    RBC 4.54 4.10 - 5.70 M/uL    HGB 13.6 12.1 - 17.0 g/dL    HCT 42.0 36.6 - 50.3 %    MCV 92.5 80.0 - 99.0 FL    MCH 30.0 26.0 - 34.0 PG    MCHC 32.4 30.0 - 36.5 g/dL    RDW 12.7 11.5 - 14.5 %    PLATELET 053 813 - 041 K/uL    MPV 10.9 8.9 - 12.9 FL    NRBC 0.0 0.0  WBC    ABSOLUTE NRBC 0.00 0.00 - 0.01 K/uL    NEUTROPHILS 68 32 - 75 %    LYMPHOCYTES 16 12 - 49 %    MONOCYTES 10 5 - 13 %    EOSINOPHILS 3 0 - 7 %    BASOPHILS 1 0 - 1 %    IMMATURE GRANULOCYTES 2 (H) 0 - 0.5 %    ABS. NEUTROPHILS 6.2 1.8 - 8.0 K/UL    ABS. LYMPHOCYTES 1.5 0.8 - 3.5 K/UL    ABS. MONOCYTES 0.9 0.0 - 1.0 K/UL    ABS. EOSINOPHILS 0.2 0.0 - 0.4 K/UL    ABS. BASOPHILS 0.1 0.0 - 0.1 K/UL    ABS. IMM. GRANS. 0.1 (H) 0.00 - 0.04 K/UL    DF AUTOMATED     METABOLIC PANEL, COMPREHENSIVE    Collection Time: 06/09/21 11:00 PM   Result Value Ref Range    Sodium 138 136 - 145 mmol/L    Potassium 3.6 3.5 - 5.1 mmol/L    Chloride 107 97 - 108 mmol/L    CO2 25 21 - 32 mmol/L    Anion gap 6 5 - 15 mmol/L    Glucose 94 65 - 100 mg/dL    BUN 21 (H) 6 - 20 mg/dL    Creatinine 1.10 0.70 - 1.30 mg/dL    BUN/Creatinine ratio 19 12 - 20      GFR est AA >60 >60 ml/min/1.73m2    GFR est non-AA >60 >60 ml/min/1.73m2    Calcium 8.5 8.5 - 10.1 mg/dL    Bilirubin, total 0.2 0.2 - 1.0 mg/dL    AST (SGOT) 15 15 - 37 U/L    ALT (SGPT) 24 12 - 78 U/L    Alk.  phosphatase 74 45 - 117 U/L    Protein, total 6.8 6.4 - 8.2 g/dL    Albumin 3.7 3.5 - 5.0 g/dL    Globulin 3.1 2.0 - 4.0 g/dL    A-G Ratio 1.2 1.1 - 2.2     TROPONIN I    Collection Time: 06/09/21 11:00 PM   Result Value Ref Range    Troponin-I, Qt. <0.05 <0.05 ng/mL   MAGNESIUM    Collection Time: 06/09/21 11:00 PM   Result Value Ref Range    Magnesium 2.0 1.6 - 2.4 mg/dL   URIC ACID    Collection Time: 06/09/21 11:00 PM   Result Value Ref Range    Uric acid 7.5 (H) 3.5 - 7.2 mg/dL   CK    Collection Time: 06/09/21 11:00 PM   Result Value Ref Range     (H) 39 - 308 U/L   C REACTIVE PROTEIN, QT    Collection Time: 06/09/21 11:00 PM   Result Value Ref Range    C-Reactive protein <0.29 0.00 - 0.60 mg/dL   ACETAMINOPHEN    Collection Time: 06/10/21 12:28 AM   Result Value Ref Range    Acetaminophen level <10 (L) 10 - 30 ug/mL    Reported dose date Not provided      Reported dose: Not provided Units   SALICYLATE    Collection Time: 06/10/21 12:28 AM   Result Value Ref Range    Salicylate level <5.7 (L) 2.8 - 20.0 mg/dL    Reported dose date Not provided      Reported dose: Not provided Units   LITHIUM    Collection Time: 06/10/21 12:28 AM   Result Value Ref Range    Lithium level 1.20 0.60 - 1.20 mmol/L    Reported dose date Not provided      Reported dose: Not provided Units   RENAL FUNCTION PANEL    Collection Time: 06/10/21  7:25 AM   Result Value Ref Range    Sodium 138 136 - 145 mmol/L    Potassium 3.8 3.5 - 5.1 mmol/L    Chloride 109 (H) 97 - 108 mmol/L    CO2 24 21 - 32 mmol/L    Anion gap 5 5 - 15 mmol/L    Glucose 101 (H) 65 - 100 mg/dL    BUN 16 6 - 20 mg/dL    Creatinine 1.04 0.70 - 1.30 mg/dL    BUN/Creatinine ratio 15 12 - 20      GFR est AA >60 >60 ml/min/1.73m2    GFR est non-AA >60 >60 ml/min/1.73m2    Calcium 8.6 8.5 - 10.1 mg/dL    Phosphorus 3.2 2.6 - 4.7 mg/dL    Albumin 3.2 (L) 3.5 - 5.0 g/dL   TSH 3RD GENERATION    Collection Time: 06/10/21  7:25 AM   Result Value Ref Range    TSH 3.40 0.36 - 3.74 uIU/mL   MAGNESIUM    Collection Time: 06/10/21  8:45 AM   Result Value Ref Range    Magnesium 2.0 1.6 - 2.4 mg/dL   CALCIUM, IONIZED    Collection Time: 06/10/21  8:45 AM   Result Value Ref Range    Calcium, ionized 1.14 0.95 - 1.35 mmol/L   AMMONIA    Collection Time: 06/10/21  8:45 AM   Result Value Ref Range    Ammonia 25 <32 umol/L           Assessment/     Patient Active Problem List   Diagnosis Code    Gait disturbance R26.9       Presents with difficulty walking/tremulousness(chronic) over the past several days-denies pain. Hx schizophrenia/psychosis, poor historian,  on multiple psych rx, lithium included with is high normal. Neuro appreciated and impression is that psychiatric meds and not to primary cns lesion contributing. Serotonin syndrome a possibility though not meeting clinical criteria. Plan:  Psychiatry consulted for further recs regarding potential side effects and/ot potential options. Hold lithium. Ativan prn. Neuro appreciated. Care Plan discussed with: patient, nurse, consultant. Total time spent with patient:30minutes. This dictation was done by dragon, computer voice recognition software. Often unanticipated grammatical, syntax, phones and other interpretive errors are inadvertently transcribed. Please excuse errors that have escaped final proofreading.     Oneyda Rdz MD

## 2021-06-10 NOTE — PROGRESS NOTES
6/10/21. CM attempted again to contact pt's brother ( Alvarez Christopher @ [phone]) no answer & vm remains full - unable to leave message.

## 2021-06-10 NOTE — PROGRESS NOTES
NEUROLOGY  PROGRESS NOTE    Admission History/Pertinent Events  Denise Christopher is a 60y.o. year old male who presented on 6/9/2021. Patient has a past medical history of Schizophrenia, Psychosis, Intellectual Disability, Anxiety/Depression who presented with difficulty with ambulation. Per chart review, patient reportedly has not been able to ambulate too well in the last few days prior to presentation to the ED. Patient reportedly developed leg pain 10 days prior to presentation and had some associated swelling. Patient saw his PCP who reportedly prescribed indomethacin and prednisone. His symptoms have reportedly not improved but in fact worsened since starting the medications. Emergent CT head was negative for any acute findings. Patient's lithium level was on the high end of normal.      ASSESSMENT/PLAN      Impression  No medical serum abnormality suggest an etiology for patient's generalized tremulousness. Stated previously, this may be potential side effect of patient's antidopaminergic medications or related to underlying psychiatric pathology. CTH WO  NAICA    Labs  Normal/WNL: Ammonia, Ionized Calcium, Magnesium       Plan      Gait Difficulties + Bilateral Lower Extremity Swelling + Tremulousness, Likely Related to Psychotropic Medications  -PT/OT/ST as needed  -Management of metabolic/infectious derangements to referring teams  -Psychiatry consulted to manage patient's psychotropic medications   -If tremulousness continues and psychiatry does not feel it is a side effect of patient's underlying psychiatric pathology or antidopaminergic medications then will need to consider holding serotonergic medications        SUBJECTIVE   No significant changes on patient's neurological examination. Physical/Neurological Exam  Patient awake, alert; following central and peripheral commands   No expressive or receptive aphasia;  No dysarthria   Pupils react to light bilaterally; EOM Intact   No visual field deficits on gross exam   Intact to light touch on face bilaterally   No facial droop   Motor: 5 -/5 throughout  High-frequency low amplitude kinetic and postural tremors in the bilateral lower extremities and bilateral upper extremities  Sensation to light touch intact grossly throughout   Reflexes: 1+ in bilateral ankles, 2+ throughout elsewhere  Toes equivocal bilaterally      OBJECTIVE  Vital Signs  Temp:  [98.4 °F (36.9 °C)]   Pulse (Heart Rate):  [57-93]   BP: (120-136)/(80-83)   Resp Rate:  [16-18]   O2 Sat (%):  [96 %-98 %]   Weight:  [68 kg (150 lb)]     MEDICATIONS    Current Facility-Administered Medications:     sodium chloride (NS) flush 5-40 mL, 5-40 mL, IntraVENous, Q8H, Vanessa Grace MD    sodium chloride (NS) flush 5-40 mL, 5-40 mL, IntraVENous, PRN, Lashell Ying MD    acetaminophen (TYLENOL) tablet 650 mg, 650 mg, Oral, Q6H PRN **OR** acetaminophen (TYLENOL) suppository 650 mg, 650 mg, Rectal, Q6H PRN, Lashell Ying MD    ondansetron (ZOFRAN ODT) tablet 4 mg, 4 mg, Oral, Q6H PRN **OR** ondansetron (ZOFRAN) injection 4 mg, 4 mg, IntraVENous, Q6H PRN, Lashell Ying MD    0.9% sodium chloride infusion, 100 mL/hr, IntraVENous, CONTINUOUS, Lashell Ying MD    benztropine (COGENTIN) tablet 1 mg, 1 mg, Oral, BID, Lashell Ying MD    buPROPion XL (WELLBUTRIN XL) tablet 150 mg, 150 mg, Oral, QPM, Lashell Ying MD    FLUoxetine (PROzac) capsule 40 mg, 40 mg, Oral, QAM, Lashell Ying MD    FLUoxetine (PROzac) capsule 20 mg, 20 mg, Oral, QHS, Lashell Ying MD    lithium carbonate tablet 300 mg, 300 mg, Oral, TID, Lashell Ying MD    risperiDONE (RisperDAL) tablet 3 mg, 3 mg, Oral, BID, Lashell Ying MD    Current Outpatient Medications:     benztropine (COGENTIN) 1 mg tablet, Take 1 mg by mouth two (2) times a day., Disp: , Rfl:     buPROPion XL (WELLBUTRIN XL) 150 mg tablet, Take 150 mg by mouth every evening., Disp: , Rfl:     FLUoxetine (PROzac) 20 mg capsule, Take 20 mg by mouth three (3) times daily. , Disp: , Rfl:     risperiDONE (RisperDAL) 3 mg tablet, Take 3 mg by mouth two (2) times a day., Disp: , Rfl:     lithium carbonate 300 mg tablet, Take 300 mg by mouth three (3) times daily. , Disp: , Rfl:       Labs: I've reviewed the labs for today     This document has been prepared by the [employer] voice recognition system, typographical errors may have occurred.  Attempts have been made to correct errors, however inadvertent errors may persist.

## 2021-06-10 NOTE — CONSULTS
Neurology Consult Note    HPI  Mr. Abi Christopher is a 60 y.o.  male with a history significant for Schizophrenia, Psychosis, Intellectual Disability, Anxiety/Depression who presented with difficulty with ambulation. Per chart review, patient reportedly has not been able to ambulate too well in the last few days prior to presentation to the ED. Patient reportedly developed leg pain 10 days prior to presentation and had some associated swelling. Patient saw his PCP who reportedly prescribed indomethacin and prednisone. His symptoms have reportedly not improved but in fact worsened since starting the medications. Emergent CT head was negative for any acute findings. Patient's lithium level was on the high end of normal.    Patient reports that he just had difficulty with his walking recently but does not really have any weakness or any numbness or tingling in his lower extremities. He reports that he does not have any pain but feels that there is some swelling especially in his bilateral knees and lower extremities. He has no other complaints at the current time. He reports he has no prior history of strokes, seizures or neoplastic disorders that he is aware of. Normally he ambulates without assistance or device. Leslie Rick  Mr. Abi Christopher is a 60 y.o.  male with the above history presented with worsening difficulty with ambulation and edema in the lower extremity along with some stiffness and tremulousness for 10 days prior to presentation. Emergent CT head was negative for any acute findings. Patient's lithium level was on the higher end of normal.  Patient CK was within acceptable limits. Etiology of patient's lower extremity swelling unlikely to be of a neurologic etiology. Patient has intact reflexes lower extremity and intact strength along with intact sensation. No evidence of upper motor neuron findings to suggest a spinal cord lesion.   No encephalopathy on examination or fevers or stiff neck to suggest meningeal irritation. Patient seems to have tremulousness at posture in the lower extremities and the bilateral upper extremities which may be a side effect of patient's lithium or potentially from antidopaminergic medications. Do not feel patient has a primary CNS lesion as to the etiology of symptoms at the current time. No further neuroimaging or neurophysiologic testing is necessary. RECOMMENDATIONS      Gait Difficulties + Bilateral Lower Extremity Swelling + Tremulousness, Likely Related to Psychotropic Medications  -PT/OT/ST as needed  -Management of metabolic/infectious derangements to referring teams  -Psychiatry consulted to manage patient's psychotropic medications  -F/U Magnesium, Ammonia, Ionized Calcium       Review of Systems  A 14 point review was done and pertinent positives & negative findings are listed as part of the history of present illness, all other systems were reviewed and are negative. Physical/Neurological Exam  Cardiovascular: tachycardic at times  Pulmonary: no increased work of breathing  Gastrointestinal/Abdomen: soft w/hypoactive bowel sounds   Skin: warm and dry      Patient awake, alert; following central and peripheral commands   No expressive or receptive aphasia; No dysarthria   Pupils react to light bilaterally; EOM Intact   No visual field deficits on gross exam   Intact to light touch on face bilaterally   No facial droop   No gross hearing loss   Tongue is midline   Motor: 5 -/5 throughout  High-frequency low amplitude kinetic and postural tremors in the bilateral lower extremities and bilateral upper extremities  Normal tone throughout   Sensation to light touch intact grossly throughout   Reflexes: 1+ in bilateral ankles, 2+ throughout elsewhere  Toes equivocal bilaterally    No Brudzinski's or Kernig signs      Past Medical History:   Diagnosis Date    Intellectual disability     Schizophrenia ([facility] [address]       History reviewed.  No pertinent surgical history. No Known Allergies  Family History   Problem Relation Age of Onset    No Known Problems Mother     No Known Problems Father         Social Connections:     Frequency of Communication with Friends and Family:     Frequency of Social Gatherings with Friends and Family:     Attends Shinto Services:     Active Member of Clubs or Organizations:     Attends Club or Organization Meetings:     Marital Status:          Medications  Current Outpatient Medications   Medication Instructions    benztropine (COGENTIN) 1 mg, Oral, 2 TIMES DAILY    buPROPion XL (WELLBUTRIN XL) 150 mg, Oral, EVERY EVENING    FLUoxetine (PROZAC) 20 mg, Oral, 3 TIMES DAILY    lithium carbonate 300 mg, Oral, 3 TIMES DAILY    risperiDONE (RISPERDAL) 3 mg, Oral, 2 TIMES DAILY       Current Facility-Administered Medications   Medication Dose Route Frequency    sodium chloride (NS) flush 5-40 mL  5-40 mL IntraVENous Q8H    sodium chloride (NS) flush 5-40 mL  5-40 mL IntraVENous PRN    acetaminophen (TYLENOL) tablet 650 mg  650 mg Oral Q6H PRN    Or    acetaminophen (TYLENOL) suppository 650 mg  650 mg Rectal Q6H PRN    ondansetron (ZOFRAN ODT) tablet 4 mg  4 mg Oral Q6H PRN    Or    ondansetron (ZOFRAN) injection 4 mg  4 mg IntraVENous Q6H PRN    0.9% sodium chloride infusion  100 mL/hr IntraVENous CONTINUOUS    benztropine (COGENTIN) tablet 1 mg  1 mg Oral BID    buPROPion XL (WELLBUTRIN XL) tablet 150 mg  150 mg Oral QPM    FLUoxetine (PROzac) capsule 40 mg  40 mg Oral QAM    FLUoxetine (PROzac) capsule 20 mg  20 mg Oral QHS    lithium carbonate tablet 300 mg  300 mg Oral TID    risperiDONE (RisperDAL) tablet 3 mg  3 mg Oral BID     Current Outpatient Medications   Medication Sig    benztropine (COGENTIN) 1 mg tablet Take 1 mg by mouth two (2) times a day.  buPROPion XL (WELLBUTRIN XL) 150 mg tablet Take 150 mg by mouth every evening.     FLUoxetine (PROzac) 20 mg capsule Take 20 mg by mouth three (3) times daily.  risperiDONE (RisperDAL) 3 mg tablet Take 3 mg by mouth two (2) times a day.  lithium carbonate 300 mg tablet Take 300 mg by mouth three (3) times daily. Objective  Temp:  [98.4 °F (36.9 °C)]   Pulse (Heart Rate):  [57-93]   BP: (120-136)/(80-83)   Resp Rate:  [16-18]   O2 Sat (%):  [96 %-98 %]   Weight:  [68 kg (150 lb)]   No intake or output data in the 24 hours ending 06/10/21 0713  Wt Readings from Last 3 Encounters:   06/09/21 68 kg (150 lb)        Labs  Recent Results (from the past 24 hour(s))   CBC WITH AUTOMATED DIFF    Collection Time: 06/09/21 11:00 PM   Result Value Ref Range    WBC 8.9 4.1 - 11.1 K/uL    RBC 4.54 4.10 - 5.70 M/uL    HGB 13.6 12.1 - 17.0 g/dL    HCT 42.0 36.6 - 50.3 %    MCV 92.5 80.0 - 99.0 FL    MCH 30.0 26.0 - 34.0 PG    MCHC 32.4 30.0 - 36.5 g/dL    RDW 12.7 11.5 - 14.5 %    PLATELET 481 004 - 235 K/uL    MPV 10.9 8.9 - 12.9 FL    NRBC 0.0 0.0  WBC    ABSOLUTE NRBC 0.00 0.00 - 0.01 K/uL    NEUTROPHILS 68 32 - 75 %    LYMPHOCYTES 16 12 - 49 %    MONOCYTES 10 5 - 13 %    EOSINOPHILS 3 0 - 7 %    BASOPHILS 1 0 - 1 %    IMMATURE GRANULOCYTES 2 (H) 0 - 0.5 %    ABS. NEUTROPHILS 6.2 1.8 - 8.0 K/UL    ABS. LYMPHOCYTES 1.5 0.8 - 3.5 K/UL    ABS. MONOCYTES 0.9 0.0 - 1.0 K/UL    ABS. EOSINOPHILS 0.2 0.0 - 0.4 K/UL    ABS. BASOPHILS 0.1 0.0 - 0.1 K/UL    ABS. IMM.  GRANS. 0.1 (H) 0.00 - 0.04 K/UL    DF AUTOMATED     METABOLIC PANEL, COMPREHENSIVE    Collection Time: 06/09/21 11:00 PM   Result Value Ref Range    Sodium 138 136 - 145 mmol/L    Potassium 3.6 3.5 - 5.1 mmol/L    Chloride 107 97 - 108 mmol/L    CO2 25 21 - 32 mmol/L    Anion gap 6 5 - 15 mmol/L    Glucose 94 65 - 100 mg/dL    BUN 21 (H) 6 - 20 mg/dL    Creatinine 1.10 0.70 - 1.30 mg/dL    BUN/Creatinine ratio 19 12 - 20      GFR est AA >60 >60 ml/min/1.73m2    GFR est non-AA >60 >60 ml/min/1.73m2    Calcium 8.5 8.5 - 10.1 mg/dL    Bilirubin, total 0.2 0.2 - 1.0 mg/dL    AST (SGOT) 15 15 - 37 U/L    ALT (SGPT) 24 12 - 78 U/L    Alk. phosphatase 74 45 - 117 U/L    Protein, total 6.8 6.4 - 8.2 g/dL    Albumin 3.7 3.5 - 5.0 g/dL    Globulin 3.1 2.0 - 4.0 g/dL    A-G Ratio 1.2 1.1 - 2.2     TROPONIN I    Collection Time: 06/09/21 11:00 PM   Result Value Ref Range    Troponin-I, Qt. <0.05 <0.05 ng/mL   MAGNESIUM    Collection Time: 06/09/21 11:00 PM   Result Value Ref Range    Magnesium 2.0 1.6 - 2.4 mg/dL   URIC ACID    Collection Time: 06/09/21 11:00 PM   Result Value Ref Range    Uric acid 7.5 (H) 3.5 - 7.2 mg/dL   CK    Collection Time: 06/09/21 11:00 PM   Result Value Ref Range     (H) 39 - 308 U/L   C REACTIVE PROTEIN, QT    Collection Time: 06/09/21 11:00 PM   Result Value Ref Range    C-Reactive protein <0.29 0.00 - 0.60 mg/dL   ACETAMINOPHEN    Collection Time: 06/10/21 12:28 AM   Result Value Ref Range    Acetaminophen level <10 (L) 10 - 30 ug/mL    Reported dose date Not provided      Reported dose: Not provided Units   SALICYLATE    Collection Time: 06/10/21 12:28 AM   Result Value Ref Range    Salicylate level <9.1 (L) 2.8 - 20.0 mg/dL    Reported dose date Not provided      Reported dose: Not provided Units   LITHIUM    Collection Time: 06/10/21 12:28 AM   Result Value Ref Range    Lithium level 1.20 0.60 - 1.20 mmol/L    Reported dose date Not provided      Reported dose: Not provided Units          Significant Diagnostic Studies  All images independently visualized    CT HEAD WO CONT   Final Result   Mild senescent changes consistent with the patient's age. Stable. No acute or active intracranial process. XR CHEST PORT   Final Result   No evidence of an acute cardiopulmonary process. This document has been prepared by the Dragon voice recognition system, typographical errors may have occurred.  Attempts have been made to correct errors, however inadvertent errors may persist.

## 2021-06-10 NOTE — ED PROVIDER NOTES
EMERGENCY DEPARTMENT HISTORY AND PHYSICAL EXAM      Date: 6/9/2021  Patient Name: Abi Christopher      History of Presenting Illness     Chief Complaint   Patient presents with    Leg Swelling       History Provided By: Patient's Father    HPI: Abi Christopher, 60 y.o. male with a past medical history significant Psychosis, depression, schizophrenia presents to the ED with cc of not able to walk on his leg over past few days has been worsened today. According to the father, patient developed some leg pain about 10 days ago. He was seen by his primary care doctor. He was placed on indomethacin and prednisone. Ever since patient's been taking the medicine his condition is worsening. Patient denies any recent URI symptoms. No congestion. No injury. No shortness of breath or chest pain. No abdominal pain. No headache. No other complaints. There are no other complaints, changes, or physical findings at this time. PCP: Sonny Camara DO        Past History     Past Medical History:  No past medical history on file. Past Surgical History:  No past surgical history on file. Family History:  No family history on file. Social History:  Social History     Tobacco Use    Smoking status: Not on file   Substance Use Topics    Alcohol use: Not on file    Drug use: Not on file       Allergies:  No Known Allergies      Review of Systems     Review of Systems   Constitutional: Negative. HENT: Negative. Eyes: Negative. Respiratory: Negative. Cardiovascular: Positive for leg swelling. Gastrointestinal: Negative. Endocrine: Negative. Genitourinary: Negative. Musculoskeletal: Negative. Skin: Negative. Allergic/Immunologic: Negative. Neurological: Positive for weakness. Hematological: Negative. Psychiatric/Behavioral: Positive for behavioral problems. All other systems reviewed and are negative. Physical Exam     Physical Exam  Vitals and nursing note reviewed.    Constitutional: General: He is in acute distress. Appearance: Normal appearance. He is normal weight. He is not ill-appearing, toxic-appearing or diaphoretic. HENT:      Head: Normocephalic and atraumatic. Nose: Nose normal. No congestion or rhinorrhea. Eyes:      General: No scleral icterus. Right eye: No discharge. Left eye: No discharge. Extraocular Movements: Extraocular movements intact. Conjunctiva/sclera: Conjunctivae normal.      Pupils: Pupils are equal, round, and reactive to light. Cardiovascular:      Rate and Rhythm: Normal rate and regular rhythm. Pulses: Normal pulses. Heart sounds: Normal heart sounds. Pulmonary:      Effort: Pulmonary effort is normal. No respiratory distress. Breath sounds: Normal breath sounds. No stridor. No wheezing, rhonchi or rales. Chest:      Chest wall: No tenderness. Abdominal:      General: Abdomen is flat. Bowel sounds are normal. There is no distension. Palpations: Abdomen is soft. Tenderness: There is no abdominal tenderness. There is no right CVA tenderness, left CVA tenderness, guarding or rebound. Musculoskeletal:         General: No swelling, tenderness, deformity or signs of injury. Normal range of motion. Cervical back: Normal range of motion and neck supple. No rigidity or tenderness. Right lower leg: No edema. Left lower leg: No edema. Skin:     General: Skin is warm and dry. Coloration: Skin is not jaundiced or pale. Findings: No bruising, erythema, lesion or rash. Neurological:      General: No focal deficit present. Mental Status: He is alert. Mental status is at baseline. Cranial Nerves: No cranial nerve deficit. Sensory: No sensory deficit. Comments: Patient is having hard time walking on his legs. At this point am not sure if that is due to the pain or any other pathology. Patient does not tell me why he cannot walk on his legs.    Psychiatric: Comments: Is very agitated. He is anxious. Positive depression. Lab and Diagnostic Study Results     Labs -     Recent Results (from the past 12 hour(s))   CBC WITH AUTOMATED DIFF    Collection Time: 06/09/21 11:00 PM   Result Value Ref Range    WBC 8.9 4.1 - 11.1 K/uL    RBC 4.54 4.10 - 5.70 M/uL    HGB 13.6 12.1 - 17.0 g/dL    HCT 42.0 36.6 - 50.3 %    MCV 92.5 80.0 - 99.0 FL    MCH 30.0 26.0 - 34.0 PG    MCHC 32.4 30.0 - 36.5 g/dL    RDW 12.7 11.5 - 14.5 %    PLATELET 507 963 - 550 K/uL    MPV 10.9 8.9 - 12.9 FL    NRBC 0.0 0.0  WBC    ABSOLUTE NRBC 0.00 0.00 - 0.01 K/uL    NEUTROPHILS 68 32 - 75 %    LYMPHOCYTES 16 12 - 49 %    MONOCYTES 10 5 - 13 %    EOSINOPHILS 3 0 - 7 %    BASOPHILS 1 0 - 1 %    IMMATURE GRANULOCYTES 2 (H) 0 - 0.5 %    ABS. NEUTROPHILS 6.2 1.8 - 8.0 K/UL    ABS. LYMPHOCYTES 1.5 0.8 - 3.5 K/UL    ABS. MONOCYTES 0.9 0.0 - 1.0 K/UL    ABS. EOSINOPHILS 0.2 0.0 - 0.4 K/UL    ABS. BASOPHILS 0.1 0.0 - 0.1 K/UL    ABS. IMM. GRANS. 0.1 (H) 0.00 - 0.04 K/UL    DF AUTOMATED     METABOLIC PANEL, COMPREHENSIVE    Collection Time: 06/09/21 11:00 PM   Result Value Ref Range    Sodium 138 136 - 145 mmol/L    Potassium 3.6 3.5 - 5.1 mmol/L    Chloride 107 97 - 108 mmol/L    CO2 25 21 - 32 mmol/L    Anion gap 6 5 - 15 mmol/L    Glucose 94 65 - 100 mg/dL    BUN 21 (H) 6 - 20 mg/dL    Creatinine 1.10 0.70 - 1.30 mg/dL    BUN/Creatinine ratio 19 12 - 20      GFR est AA >60 >60 ml/min/1.73m2    GFR est non-AA >60 >60 ml/min/1.73m2    Calcium 8.5 8.5 - 10.1 mg/dL    Bilirubin, total 0.2 0.2 - 1.0 mg/dL    AST (SGOT) 15 15 - 37 U/L    ALT (SGPT) 24 12 - 78 U/L    Alk.  phosphatase 74 45 - 117 U/L    Protein, total 6.8 6.4 - 8.2 g/dL    Albumin 3.7 3.5 - 5.0 g/dL    Globulin 3.1 2.0 - 4.0 g/dL    A-G Ratio 1.2 1.1 - 2.2     TROPONIN I    Collection Time: 06/09/21 11:00 PM   Result Value Ref Range    Troponin-I, Qt. <0.05 <0.05 ng/mL   MAGNESIUM    Collection Time: 06/09/21 11:00 PM   Result Value Ref Range    Magnesium 2.0 1.6 - 2.4 mg/dL   URIC ACID    Collection Time: 06/09/21 11:00 PM   Result Value Ref Range    Uric acid 7.5 (H) 3.5 - 7.2 mg/dL       Radiologic Studies -   [unfilled]  CT Results  (Last 48 hours)               06/09/21 2314  CT HEAD WO CONT Final result    Impression:  Mild senescent changes consistent with the patient's age. Stable. No acute or active intracranial process. Narrative:  PROCEDURE: CT HEAD WO CONT       HISTORY:Weakness       COMPARISON:Head CT 10 February 2020       Department policy stipulates all CT scans at this facility are performed using   dose reduction optimization techniques as appropriate to the performed exam,   including the following: Automated exposure control, adjustments of the mA   and/or KVP according to the patient size, and the use of iterative   reconstruction technique. TECHNIQUE: Without IV contrast       Bones/extracranial soft tissues:  Within normal limits   Extra-axial spaces:  No hemorrhage, mass or focal fluid collection. Ventricles/sulci:  There is mild dilatation of the ventricles. Sulci are   prominent. Brain parenchyma:   No mass effect. There is good gray-white differentiation. CXR Results  (Last 48 hours)               06/09/21 2245  XR CHEST PORT Final result    Impression:  No evidence of an acute cardiopulmonary process. Narrative:  XR CHEST PORT       Comparison:  None available       Single view: The lungs are clear. No pneumothorax or pleural effusion apparent. The heart size is normal. Calcified mediastinal lymph nodes are noted. There is   no evidence of acute cardiac decompensation. Medical Decision Making and ED Course   - I am the first and primary provider for this patient AND AM THE PRIMARY PROVIDER OF RECORD. - I reviewed the vital signs, available nursing notes, past medical history, past surgical history, family history and social history.     - Initial assessment performed. The patients presenting problems have been discussed, and the staff are in agreement with the care plan formulated and outlined with them. I have encouraged them to ask questions as they arise throughout their visit. Vital Signs-Reviewed the patient's vital signs. Patient Vitals for the past 12 hrs:   Temp Pulse Resp BP SpO2   06/09/21 2225 98.4 °F (36.9 °C) 70 18 127/80 97 %       EKG interpretation: (Preliminary): EKG was done at 10:45 PM.  Normal sinus rhythm. Rate of 63. Nonspecific T wave abnormality. Normal axis. No STEMI. No acute ST-T elevation. No old EKG available for comparison. Records Reviewed: Nursing Notes        ED Course:              Provider Notes (Medical Decision Making):     MDM  Number of Diagnoses or Management Options  Gait disturbance: new, needed workup  History of schizophrenia: new, needed workup  Diagnosis management comments: Differential diagnosis include bilateral leg pain, osteoarthritis, arthritis, weakness, Guillain-Barré syndrome, intracranial pathology, schizophrenia, psychiatric disorder. Amount and/or Complexity of Data Reviewed  Clinical lab tests: ordered and reviewed  Tests in the radiology section of CPT®: ordered and reviewed  Tests in the medicine section of CPT®: reviewed and ordered  Discuss the patient with other providers: yes (Case discussed with admitting physician, Da Montez. Will admit patient to her service.)  Independent visualization of images, tracings, or specimens: yes (EKG was done at 10:45 PM.  Normal sinus rhythm. Rate of 63. Nonspecific T wave abnormality. Normal axis. No STEMI. No acute ST-T elevation. No old EKG available for comparison.)    Risk of Complications, Morbidity, and/or Mortality  Presenting problems: high  Diagnostic procedures: high  Management options: high  General comments: Patient remained stable throughout the course of treatment.   Labs were essentially unremarkable except for mild elevation of uric acid. However I do not believe that is because of patient not able to walk very well. CAT scan of the head was negative. Chest x-ray was negative. Troponin was negative. EKG did not show any changes. Case discussed with admitting physician. The decision was made to admit the patient for further evaluation management. Case also discussed with patient and family. They understood and agreed with her management. Patient Progress  Patient progress: stable             Consultations:       Consultations:         Procedures and Critical Care           NOTES   :  I have spent critical care time involved in lab review, consultations with specialist, family decision- making, bedside attention and documentation. This time excludes time spent in any separate billed procedures. During this entire length of time I was immediately available to the patient . Yuliana Wray, DO        Disposition     Disposition: Condition stable    Admitted        Diagnosis     Clinical Impression:   1. Gait disturbance    2. History of schizophrenia        Attestations:    Yuliana Wray, DO    Please note that this dictation was completed with [employer], the computer voice recognition software. Quite often unanticipated grammatical, syntax, homophones, and other interpretive errors are inadvertently transcribed by the computer software. Please disregard these errors. Please excuse any errors that have escaped final proofreading. Thank you.

## 2021-06-11 LAB
CK SERPL-CCNC: 141 U/L (ref 39–308)
DATE LAST DOSE: NORMAL
LITHIUM SERPL-SCNC: 1 MMOL/L (ref 0.6–1.2)
REPORTED DOSE,DOSE: NORMAL UNITS

## 2021-06-11 PROCEDURE — 97530 THERAPEUTIC ACTIVITIES: CPT

## 2021-06-11 PROCEDURE — 74011250636 HC RX REV CODE- 250/636: Performed by: HOSPITALIST

## 2021-06-11 PROCEDURE — 94760 N-INVAS EAR/PLS OXIMETRY 1: CPT

## 2021-06-11 PROCEDURE — 80178 ASSAY OF LITHIUM: CPT

## 2021-06-11 PROCEDURE — 74011250637 HC RX REV CODE- 250/637: Performed by: HOSPITALIST

## 2021-06-11 PROCEDURE — 65270000029 HC RM PRIVATE

## 2021-06-11 PROCEDURE — 36415 COLL VENOUS BLD VENIPUNCTURE: CPT

## 2021-06-11 PROCEDURE — 97165 OT EVAL LOW COMPLEX 30 MIN: CPT

## 2021-06-11 PROCEDURE — 97162 PT EVAL MOD COMPLEX 30 MIN: CPT

## 2021-06-11 PROCEDURE — 82550 ASSAY OF CK (CPK): CPT

## 2021-06-11 RX ORDER — RISPERIDONE 2 MG/1
2 TABLET, FILM COATED ORAL 2 TIMES DAILY
Status: CANCELLED | OUTPATIENT
Start: 2021-06-11

## 2021-06-11 RX ORDER — HALOPERIDOL DECANOATE 50 MG/ML
100 INJECTION INTRAMUSCULAR EVERY 2 WEEKS
Status: ON HOLD | COMMUNITY
End: 2021-09-05

## 2021-06-11 RX ADMIN — BENZTROPINE MESYLATE 1 MG: 1 TABLET ORAL at 22:44

## 2021-06-11 RX ADMIN — Medication 10 ML: at 06:05

## 2021-06-11 RX ADMIN — BUPROPION HYDROCHLORIDE 150 MG: 150 TABLET, FILM COATED, EXTENDED RELEASE ORAL at 18:25

## 2021-06-11 RX ADMIN — RISPERIDONE 3 MG: 2 TABLET ORAL at 09:33

## 2021-06-11 RX ADMIN — RISPERIDONE 3 MG: 2 TABLET ORAL at 22:43

## 2021-06-11 RX ADMIN — Medication 10 ML: at 15:27

## 2021-06-11 RX ADMIN — BENZTROPINE MESYLATE 1 MG: 1 TABLET ORAL at 09:33

## 2021-06-11 RX ADMIN — FLUOXETINE 20 MG: 20 CAPSULE ORAL at 22:43

## 2021-06-11 RX ADMIN — Medication 10 ML: at 22:46

## 2021-06-11 RX ADMIN — SODIUM CHLORIDE 100 ML/HR: 9 INJECTION, SOLUTION INTRAVENOUS at 22:52

## 2021-06-11 RX ADMIN — FLUOXETINE 40 MG: 20 CAPSULE ORAL at 09:39

## 2021-06-11 NOTE — PROGRESS NOTES
Problem: Pressure Injury - Risk of  Goal: *Prevention of pressure injury  Description: Document Jevon Scale and appropriate interventions in the flowsheet. Outcome: Progressing Towards Goal  Note: Pressure Injury Interventions:  Sensory Interventions: Assess changes in LOC         Activity Interventions: PT/OT evaluation    Mobility Interventions: PT/OT evaluation                          Problem: Patient Education: Go to Patient Education Activity  Goal: Patient/Family Education  Outcome: Progressing Towards Goal     Problem: Falls - Risk of  Goal: *Absence of Falls  Description: Document Keisha Brooks Fall Risk and appropriate interventions in the flowsheet.   Outcome: Progressing Towards Goal  Note: Fall Risk Interventions:            Medication Interventions: Patient to call before getting OOB                   Problem: Patient Education: Go to Patient Education Activity  Goal: Patient/Family Education  Outcome: Progressing Towards Goal

## 2021-06-11 NOTE — PROGRESS NOTES
OCCUPATIONAL THERAPY EVALUATION  Patient: Iva Phillips Candi (56 y.o. male)  Date: 6/11/2021  Primary Diagnosis: Gait disturbance [R26.9]        Precautions: falls, tremors    ASSESSMENT  Pt is a 59 yo male admitted on 6/9/2021 for difficulty ambulating; pt amb without difficulty and no AD up until 2 weeks ago when he started to having tremors and edema in bilateral LE. Edema improved to medication given by MD PTA but tremors and twitching continued. PMH: Schizophrenia, Psychosis, Intellectual Disability, Anxiety/Depression. Pt A&O x self only. Per medical records pt resides with brother, unknown PLOF PTA due to worsening functional status and confusion at time of evaluation. Per medical records pt was amb without AD 2 weeks prior to admission, unsure of ability immediately prior to admission; would benefit confirmation of PLOF for more accurate DC recommendations. Based on the objective data described below, the patient presents with tremors and shaking increasing with mobility, generalized decreased strength, decreased standing balance, impaired activity tolerance, increased need for A with self care and functional mobility/transfers. Pt semi-supine in bed upon OT/PT arrival, agreeable to evaluation. Pt required min A for bed mobility, min A supine > sit, min Ax1-2 sit <> stand transfers. Pt amb to and from bathroom with gt belt, initially without AD but required RW to exit bathroom, and min A x1 initially to bathroom regressing to min to mod A x2. Patient stood at sink ~5 minutes for min A grooming to brush teeth, generally able to perform grooming task but assist required due to decreased balance during standing task. Patient returned to recliner in room and lunch tray available, pt requiring min A feeding with A for container management and utensil management. Pt required mod to max verbal and tactile cues for safety and sequencing with RW.  Pt did poor with session today with requiring increased assistance for all mobility especially amb, requiring verbal, visual and tactile cues with increased assistance for safety, tremors limiting ability to participate with grooming and feeding tasks at this time. Patient would benefit from continued skilled OT services to address above deficits and improve safety and independence with self care and functional mobility/transfers. Recommend discharge to SNF when medically appropriate. Current Level of Function Impacting Discharge (ADLs/self-care): min A grooming and feeding. Other factors to consider for discharge: time since onset, unknown PLOF, severity of deficits        PLAN :  Recommendations and Planned Interventions: self care training, functional mobility training, therapeutic exercise, balance training, therapeutic activities, endurance activities, patient education, home safety training and family training/education    Frequency/Duration: Patient will be followed by occupational therapy 5 times a week to address goals. Recommendation for discharge: (in order for the patient to meet his/her long term goals)  SNF    This discharge recommendation:  Has been made in collaboration with the attending provider and/or case management    IF patient discharges home will need the following DME: TBD       SUBJECTIVE:   Patient stated [facility] you guys had lunch yet? \" When eating lunch sitting in recliner. OBJECTIVE DATA SUMMARY:   HISTORY:   Past Medical History:   Diagnosis Date    Intellectual disability     Schizophrenia ([facility] [address]    History reviewed. No pertinent surgical history.     Expanded or extensive additional review of patient history:     Home Situation  Home Environment: Private residence  # Steps to Enter: 4  One/Two Story Residence: Two story  Living Alone: No  Support Systems: Family member(s) (resides with brother)  Patient Expects to be Discharged to[de-identified] Other (comment) (home )  Current DME Used/Available at Home: None    PLOF: Unknown PLOF at this time    Hand dominance: Right, determined via observation    EXAMINATION OF PERFORMANCE DEFICITS:  Cognitive/Behavioral Status:  Neurologic State: Alert  Orientation Level: Oriented to person;Disoriented to time;Disoriented to situation;Disoriented to place  Cognition: Follows commands; Impulsive;Decreased attention/concentration;Decreased command following    Skin: intact where visible    Edema: none noted    Hearing: Auditory  Auditory Impairment: None    Vision/Perceptual:    Not formally assessed at this time    Range of Motion:  AROM: Generally decreased, functional  PROM: Generally decreased, functional    Strength:  Strength: Generally decreased, functional (grossly 3-/5)     RUE Strength  Observation: grossly observed to be 4-/5     LUE Strength  Observation: grossly observed to be 4-/5    Coordination:  Patient with difficulty following commands    Tone & Sensation:  Tone: normal    Balance:  Sitting: Without support; Intact  Standing: Impaired; With support  Standing - Static: Fair  Standing - Dynamic : Constant support    Functional Mobility and Transfers for ADLs:  Bed Mobility:  Rolling: Minimum assistance  Supine to Sit: Minimum assistance  Scooting: Minimum assistance    Transfers:  Sit to Stand: Minimum assistance; Moderate assistance  Stand to Sit: Minimum assistance; Moderate assistance  Bathroom Mobility: Moderate assistance  Assistive Device : Gait Belt;Walker, rolling    ADL Assessment:  Feeding: Minimum assistance    Oral Facial Hygiene/Grooming: Minimum assistance    ADL Intervention and task modifications:  Feeding  Feeding Assistance: Minimum assistance  Container Management: Minimum assistance  Utensil Management: Minimum assistance  Food to Mouth: Stand-by assistance    Grooming  Grooming Assistance: Minimum assistance  Position Performed: Standing  Brushing Teeth: Minimum assistance    Therapeutic Exercise:  Patient may benefit from UE HEP, initiate at next session as able     Functional Measure:    [address] [place] [address] AM-PACTM \"6 Clicks\"                                                       Daily Activity Inpatient Short Form  How much help from another person does the patient currently need. .. Total; A Lot A Little None   1. Putting on and taking off regular lower body clothing? []  1 [x]  2 []  3 []  4   2. Bathing (including washing, rinsing, drying)? []  1 [x]  2 []  3 []  4   3. Toileting, which includes using toilet, bedpan or urinal? [] 1 []  2 [x]  3 []  4   4. Putting on and taking off regular upper body clothing? []  1 []  2 [x]  3 []  4   5. Taking care of personal grooming such as brushing teeth? []  1 []  2 [x]  3 []  4   6. Eating meals? []  1 []  2 [x]  3 []  4   © , Trustees of [address] [place] [address], under license to [employer]. All rights reserved     Score:      Interpretation of Tool:  Represents clinically-significant functional categories (i.e. Activities of daily living).   Percentage of Impairment CH    0%   CI    1-19% CJ    20-39% CK    40-59% CL    60-79% CM    80-99% CN     100%   AMPA  Score 6-24 24 23 20-22 15-19 10-14 7-9 6        Occupational Therapy Evaluation Charge Determination   History Examination Decision-Making   LOW Complexity : Brief history review  MEDIUM Complexity : 3-5 performance deficits relating to physical, cognitive , or psychosocial skils that result in activity limitations and / or participation restrictions LOW Complexity : No comorbidities that affect functional and no verbal or physical assistance needed to complete eval tasks       Based on the above components, the patient evaluation is determined to be of the following complexity level: LOW     Pain Ratin/10    Activity Tolerance:   Fair, SpO2 stable on RA and requires rest breaks    After treatment patient left in no apparent distress:    Sitting in chair, Call bell within reach and Bed / chair alarm activated    COMMUNICATION/EDUCATION:   The patients plan of care was discussed with: Physical therapist and Registered nurse. Patient is unable to participate in goal setting and plan of care. This patients plan of care is appropriate for delegation to JUAN PABLO. OT/PT sessions occurred together for increased patient and clinician safety as pt requires A of 2 for mobility at this time    Meme Correa, OT student, assisting with patient therapy session and documentation, with patient verbal consent, under direct supervision of Janiya Velez OTR/L.     Problem: Self Care Deficits Care Plan (Adult)  Goal: *Acute Goals and Plan of Care (Insert Text)  Description: Pt will be mod I sup <> sit in prep for EOB ADLs  Pt will be SBA grooming standing at sink  Pt will be min A LE dressing sitting EOB/long sit  Pt will be SBA sit <>  prep for toileting LRAD  Pt will be CGA toileting/toilet transfer/cloth mgmt LRAD  Pt will be I following UE HEP in prep for self care tasks   Outcome: Not Met     Thank you for this referral.  Kayleigh Post OTR/L  Time Calculation: 28 mins

## 2021-06-11 NOTE — PROGRESS NOTES
NEUROLOGY  PROGRESS NOTE    Admission History/Pertinent Events  Iva Christopher is a 60y.o. year old male who presented on 6/9/2021. Patient has a past medical history of Schizophrenia, Psychosis, Intellectual Disability, Anxiety/Depression who presented with difficulty with ambulation. Per chart review, patient reportedly has not been able to ambulate too well in the last few days prior to presentation to the ED. Patient reportedly developed leg pain 10 days prior to presentation and had some associated swelling. Patient saw his PCP who reportedly prescribed indomethacin and prednisone. His symptoms have reportedly not improved but in fact worsened since starting the medications. Emergent CT head was negative for any acute findings. Patient's lithium level was on the high end of normal.      ASSESSMENT/PLAN      Impression  Patient making slow steady improvements in terms of his weakness in the lower extremities. Continues to be tremulous. CTH WO  NAICA    Labs  Normal/WNL: Ammonia, Ionized Calcium, Magnesium       Plan      Gait Difficulties + Bilateral Lower Extremity Swelling + Tremulousness, Likely Related to Psychotropic Medications  -PT/OT/ST as needed  -Management of metabolic/infectious derangements to referring teams  -Psychiatry consulted to manage patient's psychotropic medications   -If tremulousness continues and psychiatry does not feel it is a side effect of patient's underlying psychiatric pathology or antidopaminergic medications then will need to consider holding serotonergic medications    Please contact neurology with any further questions/concerns      SUBJECTIVE   Patient with some improvement in the strength in the lower extremities and ability to ambulate per his nursing staff. Patient used to have tremulousness. Physical/Neurological Exam  Patient awake, alert; following central and peripheral commands   No expressive or receptive aphasia;  No dysarthria   Pupils react to light bilaterally; EOM Intact   No visual field deficits on gross exam   Intact to light touch on face bilaterally   No facial droop   Motor: 5 -/5 throughout  High-frequency low amplitude kinetic and postural tremors in the bilateral lower extremities and bilateral upper extremities  Sensation to light touch intact grossly throughout   Reflexes: 1+ in bilateral ankles, 2+ throughout elsewhere  Toes equivocal bilaterally      OBJECTIVE  Vital Signs  Temp:  [97.7 °F (36.5 °C)-98.4 °F (36.9 °C)]   Pulse (Heart Rate):  [57-93]   BP: (120-160)/(73-96)   Resp Rate:  [16-18]   O2 Sat (%):  [95 %-99 %]   Weight:  [68 kg (150 lb)]     MEDICATIONS    Current Facility-Administered Medications:     sodium chloride (NS) flush 5-40 mL, 5-40 mL, IntraVENous, Q8H, Winston Alanis MD, 10 mL at 06/11/21 0605    sodium chloride (NS) flush 5-40 mL, 5-40 mL, IntraVENous, PRN, Winston Alanis MD    acetaminophen (TYLENOL) tablet 650 mg, 650 mg, Oral, Q6H PRN **OR** acetaminophen (TYLENOL) suppository 650 mg, 650 mg, Rectal, Q6H PRN, Winston Alanis MD    ondansetron (ZOFRAN ODT) tablet 4 mg, 4 mg, Oral, Q6H PRN **OR** ondansetron (ZOFRAN) injection 4 mg, 4 mg, IntraVENous, Q6H PRN, Winston Alanis MD    0.9% sodium chloride infusion, 100 mL/hr, IntraVENous, CONTINUOUS, Winston Alanis MD, Last Rate: 100 mL/hr at 06/10/21 0830, 100 mL/hr at 06/10/21 0830    benztropine (COGENTIN) tablet 1 mg, 1 mg, Oral, BID, Winston Alanis MD, 1 mg at 06/10/21 2144    buPROPion XL (WELLBUTRIN XL) tablet 150 mg, 150 mg, Oral, QPM, Winston Alanis MD, 150 mg at 06/10/21 5986    FLUoxetine (PROzac) capsule 40 mg, 40 mg, Oral, QAM, Winston Alanis MD, 40 mg at 06/10/21 0838    FLUoxetine (PROzac) capsule 20 mg, 20 mg, Oral, QHS, Winston Alanis MD, 20 mg at 06/10/21 1964    [Held by provider] lithium carbonate tablet 300 mg, 300 mg, Oral, TID, Winston Alanis MD, 300 mg at 06/10/21 1856    risperiDONE (RisperDAL) tablet 3 mg, 3 mg, Oral, BID, Slim Sims MD, 3 mg at 06/10/21 7532      Labs: I've reviewed the labs for today     This document has been prepared by the Dragon voice recognition system, typographical errors may have occurred.  Attempts have been made to correct errors, however inadvertent errors may persist.

## 2021-06-11 NOTE — PROGRESS NOTES
Hospitalist Progress Note               Daily Progress Note: 6/11/2021  60 m hx schizophrenia, psychosis, intellectual disability, anxiety & depressionpresents with gait difficulty. Tremors chronic, weakness relatively acute. Seen by neuro, doesn't feel central cns, suspect psych rx adverse effect. Psych cx'd. Subjective:   Seen at bedside in ED. He appears very tremulous, remains alert, denies pain. Problem List:  Problem List as of 6/11/2021 Date Reviewed: 6/10/2021        Codes Class Noted - Resolved    Gait disturbance ICD-10-CM: R26.9  ICD-9-CM: 781.2  6/10/2021 - Present              Medications reviewed  Current Facility-Administered Medications   Medication Dose Route Frequency    sodium chloride (NS) flush 5-40 mL  5-40 mL IntraVENous Q8H    sodium chloride (NS) flush 5-40 mL  5-40 mL IntraVENous PRN    acetaminophen (TYLENOL) tablet 650 mg  650 mg Oral Q6H PRN    Or    acetaminophen (TYLENOL) suppository 650 mg  650 mg Rectal Q6H PRN    ondansetron (ZOFRAN ODT) tablet 4 mg  4 mg Oral Q6H PRN    Or    ondansetron (ZOFRAN) injection 4 mg  4 mg IntraVENous Q6H PRN    0.9% sodium chloride infusion  100 mL/hr IntraVENous CONTINUOUS    benztropine (COGENTIN) tablet 1 mg  1 mg Oral BID    buPROPion XL (WELLBUTRIN XL) tablet 150 mg  150 mg Oral QPM    FLUoxetine (PROzac) capsule 40 mg  40 mg Oral QAM    FLUoxetine (PROzac) capsule 20 mg  20 mg Oral QHS    [Held by provider] lithium carbonate tablet 300 mg  300 mg Oral TID    risperiDONE (RisperDAL) tablet 3 mg  3 mg Oral BID       Review of Systems:   Review of Systems   Constitutional: Negative for chills, fever and malaise/fatigue. HENT: Negative. Eyes: Negative. Respiratory: Negative. Cardiovascular: Negative. Musculoskeletal: Negative for back pain, falls, joint pain, myalgias and neck pain. Skin: Negative. Neurological: Positive for tremors and weakness.  Negative for dizziness, sensory change, speech change and focal weakness. Psychiatric/Behavioral: The patient is nervous/anxious. Objective:   Physical Exam:     Visit Vitals  BP (!) 160/93 (BP 1 Location: Right upper arm, BP Patient Position: At rest)   Pulse 62   Temp 97.7 °F (36.5 °C)   Resp 18   Ht 5' 5\" (1.651 m)   Wt 68 kg (150 lb)   SpO2 96%   BMI 24.96 kg/m²      O2 Device: None (Room air)    Temp (24hrs), Av.2 °F (36.8 °C), Min:97.7 °F (36.5 °C), Max:98.4 °F (36.9 °C)    No intake/output data recorded. No intake/output data recorded. PHYSICAL EXAM:  General: Alert and awake,   Skin: Extremities and face reveal no rashes. No vazquez erythema. No telangiectasias on the chest wall. HEENT: Sclerae anicteric. Extra-occular muscles are intact. No oral ulcers. No ENT discharge. The neck is supple. Cardiovascular: Regular rate and rhythm. No murmurs, gallops, or rubs. PMI nondisplaced. Carotids without bruits. Respiratory: Comfortable breathing with no accessory muscle use. Clear breath sounds with no wheezes, rales, or rhonchi. GI: Abdomen nondistended, soft, and nontender. Normal active bowel sounds. No enlargement of the liver or spleen. No masses palpable. Rectal: Deferred   Musculoskeletal: No pitting edema of the lower legs. Extremities have good range of motion. No costovertebral tenderness. Neurological: awake and alert and follow commands. Psychiatric: Mood appears appropriate with judgement intact. Data Review:       Recent Days:  Recent Labs     21  2300   WBC 8.9   HGB 13.6   HCT 42.0        Recent Labs     06/10/21  0845 06/10/21  0725 21  2300   NA  --  138 138   K  --  3.8 3.6   CL  --  109* 107   CO2  --  24 25   GLU  --  101* 94   BUN  --  16 21*   CREA  --  1.04 1.10   CA  --  8.6 8.5   MG 2.0  --  2.0   PHOS  --  3.2  --    ALB  --  3.2* 3.7   TBILI  --   --  0.2   ALT  --   --  24     No results for input(s): PH, PCO2, PO2, HCO3, FIO2 in the last 72 hours.     24 Hour Results:  Recent Results (from the past 24 hour(s))   LITHIUM    Collection Time: 06/11/21  7:10 AM   Result Value Ref Range    Lithium level 1.00 0.60 - 1.20 mmol/L    Reported dose date Blood      Reported dose: Blood Units   CK    Collection Time: 06/11/21  7:10 AM   Result Value Ref Range     39 - 308 U/L           Assessment/     Patient Active Problem List   Diagnosis Code    Gait disturbance R26.9       Presents with difficulty walking/tremulousness(chronic)  over the past several days-denies pain. Hx schizophrenia/psychosis, poor historian,  on multiple psych rx, lithium included with is high normal. Neuro appreciated and impression is that psychiatric meds and not to primary cns lesion contributing. Serotonin syndrome a possibility though not meeting clinical criteria. Plan:  Psychiatry consulted for further recs regarding potential side effects and/ot potential options. Hold lithium. Ativan prn. Neuro appreciated. Care Plan discussed with: patient, nurse, consultant. Total time spent with patient:30minutes.     Francisco Javier High MD

## 2021-06-11 NOTE — CONSULTS
[address] [facility]    Name:  Richard Romero  MR#:  [id number]  :  1965  ACCOUNT #:  [de-identified]  DATE OF SERVICE:  2021    REASON FOR CONSULTATION:  Schizophrenia, psychosis. Here with gait abnormality, relatively acute, suspect psych medications contributing, lithium, tremors versus myoclonus. Prescription change. Chart reviewed, the patient seen. HISTORY OF PRESENT ILLNESS:  This is a 56-year-old Indonesian-American male patient, admitted to [facility] with unsteady posture and gait, trembling, weakness. Prior to this, he was able to walk but not walking. Apparently some 10-12 days ago, the patient developed swelling of both feet and erythema. He was evaluated outpatient, given indomethacin and prednisone. The swelling improved but the tremors and twitching did not. He was also not able to ambulate, previously he was ambulating and claim. He has significant tremors in both upper and lower extremities with stiffness. Denies any fever or chills. Appetite, eating well. The patient is a little bit poor historian, limited language but also some intellectual disability. The patient thought this is school, he thought this is May, could not come up with the year. He says he has [facility] background, came [place] this country [place] he was 26years old. He sees a psychiatrist but could not tell me his name or about where he sees. Could not tell me if he had any prior hospitalization or not. Currently, he is talking Prozac 20 mg, risperidone 3 mg twice a day, Wellbutrin  mg daily, lithium 300 mg three times a day. Lithium levels were 1 in the beginning. The patient says he is doing a little better from when he first came in and goes to bathroom and uses a walker. Seen by OT people and Neurology. PAST HISTORY:  Obviously, he had prior psychiatric treatment as he is on psych medications. Could not tell me any hospitalization.     ALLERGIES TO MEDICATIONS: NO KNOWN ALLERGIES. TRAUMA HISTORY:  Unknown. FAMILY HISTORY:  Unknown. SUBSTANCE ABUSE HISTORY:  Unknown. SOCIAL HISTORY:  He says he went to high school, lives with the family. MENTAL STATUS EXAMINATION:  Did not endorse any hallucinations, delusions, paranoia, depression. Mood is good. Bright affect. Minimal tremor noted. No rigidity noted. Memory and intellective functions on the low side. Insight poor. Judgment is okay. LABORATORY DATA:  CBC unremarkable. Metabolic panel, BUN 20, otherwise normal.  Liver functions are normal.  Troponin 0.05. Magnesium level 2. Uric acid 7.5. . C-reactive protein 0.29. Tylenol 10, salicylate 1.7, lithium 1.20 at the time of admission on 06/10. Renal function unremarkable. TSH 3.40, vitamin B 25 and 17.7 on the low side. Magnesium repeat was 2, calcium ionized was 1. 14. Ammonia 25. Repeat lithium on 06/11 is 1. DIAGNOSES:  1.  Schizophrenia, schizoaffective disorder. 2.  Intellectual disability, mild to moderate. 3.  Generalize muscular weakness and tremors, difficulty ambulating, weakness improving. Spoke with the nursing staff, felt that he is doing a little better than yesterday, tired a bit. The patient also felt better. Right now, his lithium is on hold. If it is restarted, I would probably start a lower dose, lithium 300 mg twice a day. I already reduced his risperidone from 3 mg twice a day to 2 mg twice a day. Continue Prozac and continue Wellbutrin.       Jaclyn Madden MD      [id number]  D:  06/11/2021 14:51  T:  06/11/2021 19:25  JOB #:  [id number]

## 2021-06-11 NOTE — PROGRESS NOTES
PHYSICAL THERAPY EVALUATION  Patient: Shannon Christopher (56 y.o. male)  Date: 6/11/2021  Primary Diagnosis: Gait disturbance [R26.9]        Precautions: falls       ASSESSMENT  Pt is a 59 yo male admitted on 6/9/2021 for difficulty ambulating; pt amb without difficulty and no AD up until 2 weeks ago when he started to having tremors and edema in bilateral LE. Edema improved to medication given by MD PTA but tremors and twitching continued. PMH: Schizophrenia, Psychosis, Intellectual Disability, Anxiety/Depression. Pt A&O x self only. Per medical records pt resides with brother, unknown PLOF PTA due to worsening functional status and confusion at time of evaluation. Per medical records pt was amb without AD 2 weeks prior to admission, unsure of ability immediately prior to admission; would benefit confirmation of PLOF for more accurate DC recommendations. Based on the objective data described below, the patient presents with generalized weakness, impaired functional mobility, impaired amb, impaired balance, and decreased activity tolerance, confusion, difficulty following commands. Pt semi-supine in bed upon PT/OT arrival, agreeable to evaluation. Pt required min A for bed mobility, min A supine > sit, min Ax1-2 sit <> stand transfers. Pt amb 10 feet bed > bathroom, 20 feet bathroom > bedside recliner gt belt, initially without AD but required RW to exit bathroom, and min A x1 initially to bathroom regressing to min to mod A x2; demonstrating very NBOS with short, shuffling, step to gt pattern with frequent LOB and knee buckling noted during last part of amb most likely due to fatigue demonstrating frequent episodes of freezing during amb, requiring verbal and visual cues to continue amb. Pt required mod to max verbal and tactile cues for safety and sequencing with RW.  Pt did poor with session today with requiring increased assistance for all mobility especially amb, requiring verbal, visual and tactile cues with increased assistance for safety. Pt will benefit from continued skilled PT to address above deficits and return to PLOF. Current PT DC recommendation SNF. Current Level of Function Impacting Discharge (mobility/balance): min to mod A x2    Other factors to consider for discharge: severity of deficits, unconfirmed PLOF      PLAN :  Recommendations and Planned Interventions: bed mobility training, transfer training, gait training, therapeutic exercises, neuromuscular re-education, patient and family training/education and therapeutic activities      Frequency/Duration: Patient will be followed by physical therapy:  5 times a week to address goals. Recommendation for discharge: (in order for the patient to meet his/her long term goals)  SNF    This discharge recommendation:  Has been made in collaboration with the attending provider and/or case management    IF patient discharges home will need the following DME: to be determined (TBD)         SUBJECTIVE:   Patient stated would be nice.  when asked if he wanted to try to use RW to exit bathroom. OBJECTIVE DATA SUMMARY:   HISTORY:    Past Medical History:   Diagnosis Date    Intellectual disability     Schizophrenia ([facility] [place] [address]    History reviewed. No pertinent surgical history. Home Situation  Home Environment: Private residence  # Steps to Enter: 4  One/Two Story Residence: Two story  Living Alone: No  Support Systems: Family member(s) (resides with brother)  Patient Expects to be Discharged to[de-identified] Other (comment) (home )  Current DME Used/Available at Home: None    EXAMINATION/PRESENTATION/DECISION MAKING:   Critical Behavior:  Neurologic State: Alert  Orientation Level: Oriented to person, Disoriented to time, Disoriented to situation, Disoriented to place  Cognition: Follows commands, Impulsive, Decreased attention/concentration, Decreased command following     Hearing:   Auditory  Auditory Impairment: None  Skin:  Intact where visible   Edema: none noted Range Of Motion:  AROM: Generally decreased, functional           PROM: Generally decreased, functional           Strength:    Strength: Generally decreased, functional (grossly 3-/5)                    Tone & Sensation:                                  Coordination:     Vision:      Functional Mobility:  Bed Mobility:  Rolling: Minimum assistance  Supine to Sit: Minimum assistance     Scooting: Minimum assistance  Transfers:  Sit to Stand: Minimum assistance; Moderate assistance  Stand to Sit: Minimum assistance; Moderate assistance                       Balance:   Sitting: Without support; Intact  Standing: Impaired; With support  Standing - Static: Fair  Standing - Dynamic : Constant support  Ambulation/Gait Training:  Distance (ft): 30 Feet (ft)  Assistive Device: Gait belt (to bathroom no AD, from bathroom required RW)  Ambulation - Level of Assistance: Minimal assistance; Moderate assistance     Gait Description (WDL): Exceptions to [facility]           Base of Support: Narrowed     Speed/Fadumo: Shuffled; Slow  Step Length: Right shortened;Left shortened        Therapeutic Exercises:   Not completed this session    Functional Measure:  [employer] AM-PAC 6 Clicks         Basic Mobility Inpatient Short Form  How much difficulty does the patient currently have. .. Unable A Lot A Little None   1. Turning over in bed (including adjusting bedclothes, sheets and blankets)? [] 1   [x] 2   [] 3   [] 4   2. Sitting down on and standing up from a chair with arms ( e.g., wheelchair, bedside commode, etc.)   [] 1   [x] 2   [] 3   [] 4   3. Moving from lying on back to sitting on the side of the bed? [] 1   [x] 2   [] 3   [] 4          How much help from another person does the patient currently need. .. Total A Lot A Little None   4. Moving to and from a bed to a chair (including a wheelchair)? [] 1   [x] 2   [] 3   [] 4   5. Need to walk in hospital room? [] 1   [x] 2   [] 3   [] 4   6.   Climbing 3-5 steps with a railing? [] 1   [x] 2   [] 3   [] 4   © , Trustees of [facility] [employer], under license to [employer]. All rights reserved     Score:  Initial:  Most Recent: X (Date: 21 )   Interpretation of Tool:  Represents activities that are increasingly more difficult (i.e. Bed mobility, Transfers, Gait). Score 24 23 22-20 19-15 14-10 9-7 6   Modifier CH CI CJ CK CL CM CN         Physical Therapy Evaluation Charge Determination   History Examination Presentation Decision-Making   HIGH Complexity :3+ comorbidities / personal factors will impact the outcome/ POC  HIGH Complexity : 4+ Standardized tests and measures addressing body structure, function, activity limitation and / or participation in recreation  MEDIUM Complexity : Evolving with changing characteristics  Other outcome measures ampac 6  mod      Based on the above components, the patient evaluation is determined to be of the following complexity level: MEDIUM    Pain Ratin/10 reported    Activity Tolerance:   Fair and requires rest breaks    After treatment patient left in no apparent distress:   Sitting in chair, Call bell within reach and Bed / chair alarm activated and nsg updated. GOALS:    Problem: Mobility Impaired (Adult and Pediatric)  Goal: *Acute Goals and Plan of Care (Insert Text)  Description: Pt will be I with LE HEP in 7 days. Pt will perform bed mobility with mod I in 7 days. Pt will perform transfers with mod I in 7 days. Pt will amb  feet with LRAD safely with mod I in 7 days. Pt will demonstrate improvement in standing dynamic balance from min A to SBA  in 7 days. Outcome: Not Met       COMMUNICATION/EDUCATION:   The patients plan of care was discussed with: Occupational therapist, Registered nurse, and Case management. Fall prevention education was provided and the patient/caregiver indicated understanding., Patient/family have participated as able in goal setting and plan of care. , and Patient/family agree to work toward stated goals and plan of care. PT/OT sessions occurred together for increased safety of pt and clinician. Halley Kumari, SPT, present during session to assist and observe with patient verbal consent under direct supervision of Gabriella Hurt, PT, DPT.        Thank you for this referral.  Fausto Ariza, PT, DPT   Time Calculation: 25 mins

## 2021-06-12 PROCEDURE — 65270000029 HC RM PRIVATE

## 2021-06-12 PROCEDURE — 74011250637 HC RX REV CODE- 250/637: Performed by: HOSPITALIST

## 2021-06-12 RX ORDER — LITHIUM CARBONATE 300 MG
300 TABLET ORAL 2 TIMES DAILY
Status: DISCONTINUED | OUTPATIENT
Start: 2021-06-12 | End: 2021-06-16 | Stop reason: HOSPADM

## 2021-06-12 RX ORDER — FLUOXETINE HYDROCHLORIDE 20 MG/1
20 CAPSULE ORAL DAILY
Status: DISCONTINUED | OUTPATIENT
Start: 2021-06-13 | End: 2021-06-16 | Stop reason: HOSPADM

## 2021-06-12 RX ADMIN — FLUOXETINE 40 MG: 20 CAPSULE ORAL at 09:12

## 2021-06-12 RX ADMIN — BENZTROPINE MESYLATE 1 MG: 1 TABLET ORAL at 21:00

## 2021-06-12 RX ADMIN — Medication 10 ML: at 16:12

## 2021-06-12 RX ADMIN — RISPERIDONE 3 MG: 2 TABLET ORAL at 09:12

## 2021-06-12 RX ADMIN — BUPROPION HYDROCHLORIDE 150 MG: 150 TABLET, FILM COATED, EXTENDED RELEASE ORAL at 17:40

## 2021-06-12 RX ADMIN — Medication 10 ML: at 04:25

## 2021-06-12 RX ADMIN — BENZTROPINE MESYLATE 1 MG: 1 TABLET ORAL at 09:12

## 2021-06-12 RX ADMIN — Medication 10 ML: at 21:00

## 2021-06-12 RX ADMIN — RISPERIDONE 3 MG: 2 TABLET ORAL at 21:00

## 2021-06-12 RX ADMIN — LITHIUM CARBONATE 300 MG: 300 TABLET ORAL at 21:00

## 2021-06-12 NOTE — PROGRESS NOTES
Bedside shift change report given to Tonie Tinsley RN (oncoming nurse) by Ada Osorio LPN(offgoing nurse). Report included the following information SBAR, Intake/Output and Recent Results.

## 2021-06-12 NOTE — PROGRESS NOTES
Hospitalist Progress Note               Daily Progress Note: 6/12/2021  60 m hx schizophrenia, psychosis, intellectual disability, anxiety & depressionpresents with gait difficulty. Tremors chronic, weakness relatively acute. Seen by neuro, doesn't feel central cns, suspect psych rx adverse effect. Psych cx'd. Subjective:   Seen at bedside in ED. He appears very tremulous, remains alert, denies pain. Problem List:  Problem List as of 6/12/2021 Date Reviewed: 6/10/2021        Codes Class Noted - Resolved    Gait disturbance ICD-10-CM: R26.9  ICD-9-CM: 781.2  6/10/2021 - Present              Medications reviewed  Current Facility-Administered Medications   Medication Dose Route Frequency    lithium carbonate tablet 300 mg  300 mg Oral BID    [START ON 6/13/2021] FLUoxetine (PROzac) capsule 20 mg  20 mg Oral DAILY    sodium chloride (NS) flush 5-40 mL  5-40 mL IntraVENous Q8H    sodium chloride (NS) flush 5-40 mL  5-40 mL IntraVENous PRN    acetaminophen (TYLENOL) tablet 650 mg  650 mg Oral Q6H PRN    Or    acetaminophen (TYLENOL) suppository 650 mg  650 mg Rectal Q6H PRN    ondansetron (ZOFRAN ODT) tablet 4 mg  4 mg Oral Q6H PRN    Or    ondansetron (ZOFRAN) injection 4 mg  4 mg IntraVENous Q6H PRN    0.9% sodium chloride infusion  100 mL/hr IntraVENous CONTINUOUS    benztropine (COGENTIN) tablet 1 mg  1 mg Oral BID    buPROPion XL (WELLBUTRIN XL) tablet 150 mg  150 mg Oral QPM    risperiDONE (RisperDAL) tablet 3 mg  3 mg Oral BID       Review of Systems:   Review of Systems   Constitutional: Negative for chills, fever and malaise/fatigue. HENT: Negative. Eyes: Negative. Respiratory: Negative. Cardiovascular: Negative. Musculoskeletal: Negative for back pain, falls, joint pain, myalgias and neck pain. Skin: Negative. Neurological: Positive for tremors and weakness. Negative for dizziness, sensory change, speech change and focal weakness.    Psychiatric/Behavioral: The patient is nervous/anxious. Objective:   Physical Exam:     Visit Vitals  /77   Pulse (!) 56   Temp 98.4 °F (36.9 °C)   Resp 20   Ht 5' 5\" (1.651 m)   Wt 68 kg (150 lb)   SpO2 97%   BMI 24.96 kg/m²      O2 Device: None (Room air)    Temp (24hrs), Av.7 °F (36.5 °C), Min:97.1 °F (36.2 °C), Max:98.4 °F (36.9 °C)    No intake/output data recorded. 06/10 1901 -  0700  In: 1849 [P.O.:760; I.V.:1089]  Out: 900 [Urine:900]  PHYSICAL EXAM:  General: Alert and awake,  Skin: Extremities and face reveal no rashes. No vazquez erythema. No telangiectasias on the chest wall. HEENT: Sclerae anicteric. Extra-occular muscles are intact. No oral ulcers. No ENT discharge. The neck is supple. Cardiovascular: Regular rate and rhythm. No murmurs, gallops, or rubs. PMI nondisplaced. Carotids without bruits. Respiratory: Comfortable breathing with no accessory muscle use. Clear breath sounds with no wheezes, rales, or rhonchi. GI: Abdomen nondistended, soft, and nontender. Normal active bowel sounds. No enlargement of the liver or spleen. No masses palpable. Rectal: Deferred   Musculoskeletal: No pitting edema of the lower legs. Extremities have good range of motion. No costovertebral tenderness. Neurological: Alert and awake and follow commands. Psychiatric: Mood appears appropriate with judgement intact. Data Review:       Recent Days:  Recent Labs     21  2300   WBC 8.9   HGB 13.6   HCT 42.0        Recent Labs     06/10/21  0845 06/10/21  0725 21  2300   NA  --  138 138   K  --  3.8 3.6   CL  --  109* 107   CO2  --  24 25   GLU  --  101* 94   BUN  --  16 21*   CREA  --  1.04 1.10   CA  --  8.6 8.5   MG 2.0  --  2.0   PHOS  --  3.2  --    ALB  --  3.2* 3.7   TBILI  --   --  0.2   ALT  --   --  24     No results for input(s): PH, PCO2, PO2, HCO3, FIO2 in the last 72 hours. 24 Hour Results:  No results found for this or any previous visit (from the past 24 hour(s)).         Assessment/ Patient Active Problem List   Diagnosis Code    Gait disturbance R26.9       Presents with difficulty walking/tremulousness(chronic)  over the past several days-denies pain. Hx schizophrenia/psychosis, poor historian,  on multiple psych rx, lithium included with is high normal. Neuro appreciated and impression is that psychiatric meds and not to primary cns lesion contributing. Serotonin syndrome a possibility though not meeting clinical criteria. Plan:  Psychiatry consulted for further recs regarding potential side effects and/ot potential options. Li decreased to 300 BID, Risperdal also decreased. Will decrease his prozac to once daily. Neurology and psych eval appreciated  Ativan prn. Neuro appreciated. Care Plan discussed with: patient, nurse, consultant. Total time spent with patient:30minutes.     Marni Mcnamara MD

## 2021-06-12 NOTE — PROGRESS NOTES
Bedside shift change report given to SAWYER Singer RN (oncoming nurse) by MARIA FERNANDA Domínguez RN (offgoing nurse). Report included the following information SBAR, Kardex, Intake/Output, MAR and Recent Results.

## 2021-06-13 PROCEDURE — 74011250637 HC RX REV CODE- 250/637: Performed by: HOSPITALIST

## 2021-06-13 PROCEDURE — 65270000029 HC RM PRIVATE

## 2021-06-13 PROCEDURE — 94760 N-INVAS EAR/PLS OXIMETRY 1: CPT

## 2021-06-13 RX ADMIN — BENZTROPINE MESYLATE 1 MG: 1 TABLET ORAL at 20:46

## 2021-06-13 RX ADMIN — BENZTROPINE MESYLATE 1 MG: 1 TABLET ORAL at 08:00

## 2021-06-13 RX ADMIN — FLUOXETINE 20 MG: 20 CAPSULE ORAL at 08:00

## 2021-06-13 RX ADMIN — LITHIUM CARBONATE 300 MG: 300 TABLET ORAL at 20:46

## 2021-06-13 RX ADMIN — Medication 10 ML: at 05:31

## 2021-06-13 RX ADMIN — LITHIUM CARBONATE 300 MG: 300 TABLET ORAL at 08:00

## 2021-06-13 RX ADMIN — RISPERIDONE 3 MG: 2 TABLET ORAL at 20:46

## 2021-06-13 RX ADMIN — Medication 10 ML: at 14:23

## 2021-06-13 RX ADMIN — RISPERIDONE 3 MG: 2 TABLET ORAL at 08:00

## 2021-06-13 RX ADMIN — BUPROPION HYDROCHLORIDE 150 MG: 150 TABLET, FILM COATED, EXTENDED RELEASE ORAL at 17:23

## 2021-06-13 NOTE — PROGRESS NOTES
Bedside shift change report given to SAWYER Jim RN (oncoming nurse) by MARIA FERNANDA Domínguez RN (offgoing nurse). Report included the following information SBAR, Kardex, Intake/Output, MAR and Recent Results.

## 2021-06-13 NOTE — PROGRESS NOTES
Hospitalist Progress Note               Daily Progress Note: 6/13/2021  60 m hx schizophrenia, psychosis, intellectual disability, anxiety & depressionpresents with gait difficulty. Tremors chronic, weakness relatively acute. Seen by neuro, doesn't feel central cns, suspect psych rx adverse effect. Psych cx'd. Subjective:   Seen at bedside in ED. He appears very tremulous, remains alert, denies pain. Problem List:  Problem List as of 6/13/2021 Date Reviewed: 6/10/2021        Codes Class Noted - Resolved    Gait disturbance ICD-10-CM: R26.9  ICD-9-CM: 781.2  6/10/2021 - Present              Medications reviewed  Current Facility-Administered Medications   Medication Dose Route Frequency    lithium carbonate tablet 300 mg  300 mg Oral BID    FLUoxetine (PROzac) capsule 20 mg  20 mg Oral DAILY    sodium chloride (NS) flush 5-40 mL  5-40 mL IntraVENous Q8H    sodium chloride (NS) flush 5-40 mL  5-40 mL IntraVENous PRN    acetaminophen (TYLENOL) tablet 650 mg  650 mg Oral Q6H PRN    Or    acetaminophen (TYLENOL) suppository 650 mg  650 mg Rectal Q6H PRN    ondansetron (ZOFRAN ODT) tablet 4 mg  4 mg Oral Q6H PRN    Or    ondansetron (ZOFRAN) injection 4 mg  4 mg IntraVENous Q6H PRN    0.9% sodium chloride infusion  100 mL/hr IntraVENous CONTINUOUS    benztropine (COGENTIN) tablet 1 mg  1 mg Oral BID    buPROPion XL (WELLBUTRIN XL) tablet 150 mg  150 mg Oral QPM    risperiDONE (RisperDAL) tablet 3 mg  3 mg Oral BID       Review of Systems:   Review of Systems   Constitutional: Negative for chills, fever and malaise/fatigue. HENT: Negative. Eyes: Negative. Respiratory: Negative. Cardiovascular: Negative. Musculoskeletal: Negative for back pain, falls, joint pain, myalgias and neck pain. Skin: Negative. Neurological: Positive for tremors and weakness. Negative for dizziness, sensory change, speech change and focal weakness. Psychiatric/Behavioral: The patient is nervous/anxious. Objective:   Physical Exam:     Visit Vitals  /68   Pulse 70   Temp 98.3 °F (36.8 °C)   Resp 18   Ht 5' 5\" (1.651 m)   Wt 68 kg (150 lb)   SpO2 98%   BMI 24.96 kg/m²      O2 Device: None (Room air)    Temp (24hrs), Av.5 °F (36.9 °C), Min:98.3 °F (36.8 °C), Max:98.7 °F (37.1 °C)    No intake/output data recorded.  1901 -  0700  In: 2903 [P.O.:960; I.V.:1943]  Out: 3125 [Urine:3125]  PHYSICAL EXAM:  General: Alert and awake,  Skin: Extremities and face reveal no rashes. No vazquez erythema. No telangiectasias on the chest wall. HEENT: Sclerae anicteric. Extra-occular muscles are intact. No oral ulcers. No ENT discharge. The neck is supple. Cardiovascular: Regular rate and rhythm. No murmurs, gallops, or rubs. PMI nondisplaced. Carotids without bruits. Respiratory: Comfortable breathing with no accessory muscle use. Clear breath sounds with no wheezes, rales, or rhonchi. GI: Abdomen nondistended, soft, and nontender. Normal active bowel sounds. No enlargement of the liver or spleen. No masses palpable. Rectal: Deferred   Musculoskeletal: No pitting edema of the lower legs. Extremities have good range of motion. No costovertebral tenderness. Neurological: Alert and awake and follow commands. Psychiatric: Mood appears appropriate with judgement intact. Data Review:       Recent Days:  No results for input(s): WBC, HGB, HCT, PLT, HGBEXT, HCTEXT, PLTEXT, HGBEXT, HCTEXT, PLTEXT in the last 72 hours. No results for input(s): NA, K, CL, CO2, GLU, BUN, CREA, CA, MG, PHOS, ALB, TBIL, TBILI, ALT, INR, INREXT, INREXT in the last 72 hours. No lab exists for component: SGOT  No results for input(s): PH, PCO2, PO2, HCO3, FIO2 in the last 72 hours. 24 Hour Results:  No results found for this or any previous visit (from the past 24 hour(s)).         Assessment/     Patient Active Problem List   Diagnosis Code    Gait disturbance R26.9       Presents with difficulty walking/tremulousness(chronic)  over the past several days-denies pain. Hx schizophrenia/psychosis, poor historian,  on multiple psych rx, lithium included with is high normal. Neuro appreciated and impression is that psychiatric meds and not to primary cns lesion contributing. Serotonin syndrome a possibility though not meeting clinical criteria. Plan:  Psychiatry consulted for further recs regarding potential side effects and/ot potential options. Li decreased to 300 BID, Risperdal also decreased. Will decrease his prozac to once daily. Neurology and psych eval appreciated  Ativan prn. Neuro appreciated. Care Plan discussed with: patient, nurse, consultant.   Called brother [phone]    Fannie Bruner MD

## 2021-06-14 PROCEDURE — 74011250637 HC RX REV CODE- 250/637: Performed by: HOSPITALIST

## 2021-06-14 PROCEDURE — 97116 GAIT TRAINING THERAPY: CPT

## 2021-06-14 PROCEDURE — 97110 THERAPEUTIC EXERCISES: CPT

## 2021-06-14 PROCEDURE — 65270000029 HC RM PRIVATE

## 2021-06-14 PROCEDURE — 74011250636 HC RX REV CODE- 250/636: Performed by: HOSPITALIST

## 2021-06-14 PROCEDURE — 97530 THERAPEUTIC ACTIVITIES: CPT

## 2021-06-14 RX ADMIN — SODIUM CHLORIDE 100 ML/HR: 9 INJECTION, SOLUTION INTRAVENOUS at 13:41

## 2021-06-14 RX ADMIN — BENZTROPINE MESYLATE 1 MG: 1 TABLET ORAL at 08:22

## 2021-06-14 RX ADMIN — BENZTROPINE MESYLATE 1 MG: 1 TABLET ORAL at 20:25

## 2021-06-14 RX ADMIN — LITHIUM CARBONATE 300 MG: 300 TABLET ORAL at 20:25

## 2021-06-14 RX ADMIN — RISPERIDONE 3 MG: 2 TABLET ORAL at 08:22

## 2021-06-14 RX ADMIN — RISPERIDONE 3 MG: 2 TABLET ORAL at 20:26

## 2021-06-14 RX ADMIN — BUPROPION HYDROCHLORIDE 150 MG: 150 TABLET, FILM COATED, EXTENDED RELEASE ORAL at 17:18

## 2021-06-14 RX ADMIN — FLUOXETINE 20 MG: 20 CAPSULE ORAL at 08:22

## 2021-06-14 RX ADMIN — LITHIUM CARBONATE 300 MG: 300 TABLET ORAL at 08:22

## 2021-06-14 NOTE — PROGRESS NOTES
Hospitalist Progress Note               Daily Progress Note: 6/14/2021  60 m hx schizophrenia, psychosis, intellectual disability, anxiety & depressionpresents with gait difficulty. Tremors chronic, weakness relatively acute. Seen by neuro, doesn't feel central cns, suspect psych rx adverse effect. Psych cx'd. Subjective:   Seen at bedside in ED. He appears very tremulous, remains alert, denies pain. Problem List:  Problem List as of 6/14/2021 Date Reviewed: 6/10/2021        Codes Class Noted - Resolved    Gait disturbance ICD-10-CM: R26.9  ICD-9-CM: 781.2  6/10/2021 - Present              Medications reviewed  Current Facility-Administered Medications   Medication Dose Route Frequency    lithium carbonate tablet 300 mg  300 mg Oral BID    FLUoxetine (PROzac) capsule 20 mg  20 mg Oral DAILY    sodium chloride (NS) flush 5-40 mL  5-40 mL IntraVENous PRN    acetaminophen (TYLENOL) tablet 650 mg  650 mg Oral Q6H PRN    Or    acetaminophen (TYLENOL) suppository 650 mg  650 mg Rectal Q6H PRN    ondansetron (ZOFRAN ODT) tablet 4 mg  4 mg Oral Q6H PRN    Or    ondansetron (ZOFRAN) injection 4 mg  4 mg IntraVENous Q6H PRN    0.9% sodium chloride infusion  100 mL/hr IntraVENous CONTINUOUS    benztropine (COGENTIN) tablet 1 mg  1 mg Oral BID    buPROPion XL (WELLBUTRIN XL) tablet 150 mg  150 mg Oral QPM    risperiDONE (RisperDAL) tablet 3 mg  3 mg Oral BID       Review of Systems:   Review of Systems   Constitutional: Negative for chills, fever and malaise/fatigue. HENT: Negative. Eyes: Negative. Respiratory: Negative. Cardiovascular: Negative. Musculoskeletal: Negative for back pain, falls, joint pain, myalgias and neck pain. Skin: Negative. Neurological: Negative for dizziness, tremors, sensory change, speech change, focal weakness and weakness. Psychiatric/Behavioral: The patient is not nervous/anxious.         Objective:   Physical Exam:     Visit Vitals  /82 (BP 1 Location: Left upper arm, BP Patient Position: At rest;Lying right side)   Pulse 72   Temp 97.4 °F (36.3 °C)   Resp 18   Ht 5' 5\" (1.651 m)   Wt 68 kg (150 lb)   SpO2 100%   BMI 24.96 kg/m²      O2 Device: None (Room air)    Temp (24hrs), Av.1 °F (36.7 °C), Min:97.4 °F (36.3 °C), Max:98.5 °F (36.9 °C)    No intake/output data recorded.  1901 -  0700  In: 1473 [P. O.:600; I.V.:854]  Out: 5200 [Urine:5200]  PHYSICAL EXAM:  General: Alert and awake,  Skin: Extremities and face reveal no rashes. No vazquez erythema. No telangiectasias on the chest wall. HEENT: Sclerae anicteric. Extra-occular muscles are intact. No oral ulcers. No ENT discharge. The neck is supple. Cardiovascular: Regular rate and rhythm. No murmurs, gallops, or rubs. PMI nondisplaced. Carotids without bruits. Respiratory: Comfortable breathing with no accessory muscle use. Clear breath sounds with no wheezes, rales, or rhonchi. GI: Abdomen nondistended, soft, and nontender. Normal active bowel sounds. No enlargement of the liver or spleen. No masses palpable. Rectal: Deferred   Musculoskeletal: No pitting edema of the lower legs. Extremities have good range of motion. No costovertebral tenderness. Neurological: Alert and awake and follow commands. Psychiatric: Mood appears appropriate with judgement intact. Data Review:       Recent Days:  No results for input(s): WBC, HGB, HCT, PLT, HGBEXT, HCTEXT, PLTEXT, HGBEXT, HCTEXT, PLTEXT in the last 72 hours. No results for input(s): NA, K, CL, CO2, GLU, BUN, CREA, CA, MG, PHOS, ALB, TBIL, TBILI, ALT, INR, INREXT, INREXT in the last 72 hours. No lab exists for component: SGOT  No results for input(s): PH, PCO2, PO2, HCO3, FIO2 in the last 72 hours. 24 Hour Results:  No results found for this or any previous visit (from the past 24 hour(s)).         Assessment/     Patient Active Problem List   Diagnosis Code    Gait disturbance R26.9       Presents with difficulty walking/tremulousness(chronic)  over the past several days-denies pain. Hx schizophrenia/psychosis, poor historian,  on multiple psych rx, lithium included with is high normal. Neuro appreciated and impression is that psychiatric meds and not to primary cns lesion contributing. Serotonin syndrome a possibility though not meeting clinical criteria. Plan:  Psychiatry consulted for further recs regarding potential side effects and/ot potential options. Li decreased to 300 BID, Risperdal also decreased. We decreased his prozac to once daily. Neurology and psych eval appreciated  Ativan prn. Neuro appreciated. Care Plan discussed with: patient, nurse, consultant.   Contact brother [phone]    Marni Mcnamara MD

## 2021-06-14 NOTE — PROGRESS NOTES
Reason for Admission:  Gait disturbance                     RUR Score: 9%                    Plan for utilizing home health:  Declined, plan is DC to SNF        PCP: First and Last name:  Janiya Bullock DO     Name of Practice: [address]   Are you a current patient: Yes/No: Yes   Approximate date of last visit: Patients brother states that he goes to his PCPs office every 2 weeks to receive an injection and he last saw Dr. Carlota Carreno about 2-3 months ago. Can you participate in a virtual visit with your PCP:                     Current Advanced Directive/Advance Care Plan: Full Code      Healthcare Decision Maker:  No advance medical directive or POA. Emergency contact is Gonzalo Christopher (brother) [phone]  Click here to [address] including selection of the Healthcare Decision Maker Relationship (ie \"Primary\")                             Transition of Care Plan: Per primary RN, patient is only alert to self. CM called patients brother, Gonzalo Christopher, to complete assessment. Patient lives with brother in a 2 story residence. Patient does not use any DME and is independent in all ADL's and IADL's. CM informed patients brother that PT/OT has recommended SNF upon discharge. CM received verbal approval to send referral to SNF's in the area. CM sent referral to [facility].

## 2021-06-14 NOTE — PROGRESS NOTES
OCCUPATIONAL THERAPY TREATMENT  Patient: Dilan Drake Candi (56 y.o. male)  Date: 6/14/2021  Diagnosis: Gait disturbance [R26.9] <principal problem not specified>       Precautions:    Chart, occupational therapy assessment, plan of care, and goals were reviewed. ASSESSMENT  Patient continues with skilled OT services and is progressing towards goals. Pt. Received semi-supine in bed sleeping on arrival. Pt woke with name called and RN administering medications. Pt. Required re-orientation to place, time, name and situation. Pt. Overall performed bed mobility with Min A x2, sit-> stand with CGA- pt required increased cueing for maneuvering RW during functional ambulation and increasing step length while completing ambulation d/t short shuffling steps demonstrated. Pt able to correct with constant cueing provided however unable to demonstrate ind. And demonstrates poor carry over at this time. Pt. Able to compete toilet transfer with Min A for lifting and lowering on to lower surface. Pt. Performed grooming at sink with set-up assistance and CGA for standing balance- increased time to perform oral care d/t pt brushing teeth multiple times for thoroughness. Pt. Required one seated RB after bathroom transfer at EOB. Pt then completed functional ambulation with RW and CGA/ Min A + 1 for safety and chair follow in hallway and required one RB d/t pt demonstrating slight R side lean and increased fatigue with increased functional ambulation. Despite therapist education and demonstration of therex- pt refused at this time. Pt. Left reclined in chair with chair alarm on and needs met. Pt would benefit from skilled OT services while at [facility] in order to increase safety and independence with self care and functional transfers/mobility. Recommend discharge to SNF when medically appropriate.      Current Level of Function Impacting Discharge (ADLs): increased assistance with functional transfers and mobility, increased confusion, decreased safety awareness    Other factors to consider for discharge: PLOF, time since on set, family support, DME         PLAN :  Patient continues to benefit from skilled intervention to address the above impairments. Continue treatment per established plan of care. to address goals. Recommendation for discharge: (in order for the patient to meet his/her long term goals)  Therapy up to 5 days/week in SNF setting    This discharge recommendation:  Has been made in collaboration with the attending provider and/or case management    IF patient discharges home will need the following DME: TBD       SUBJECTIVE:   Patient stated its the year of the [place] when therapist asked what year it was    OBJECTIVE DATA SUMMARY:   Cognitive/Behavioral Status:  Neurologic State: Alert;Confused  Orientation Level: Disoriented X4  Cognition: Decreased attention/concentration;Decreased command following; Follows commands;Poor safety awareness    Functional Mobility and Transfers for ADLs:  Bed Mobility:  Supine to Sit: Minimum assistance;Assist x2  Scooting: Minimum assistance    Transfers:  Sit to Stand: Contact guard assistance  Functional Transfers  Bathroom Mobility: Contact guard assistance  Toilet Transfer : Minimum assistance  Bed to Chair: Contact guard assistance    Balance:  Sitting: Intact; Without support  Standing: Impaired; With support  Standing - Static: Constant support; Fair  Standing - Dynamic : Constant support; Fair    ADL Intervention:       Grooming  Grooming Assistance: Set-up; Contact guard assistance  Position Performed: Standing  Washing Face: Set-up  Brushing Teeth: Set-up    Upper Body [address] Gown: Minimum  assistance    Toileting  Toileting Assistance: Stand-by assistance  Bladder Hygiene: Stand-by assistance  Clothing Management: Moderate assistance    Therapeutic Exercises:   Pt.  Refused    Pain:  0/ 10 pain     Activity Tolerance:   Fair and requires rest breaks  Please refer to the flowsheet for vital signs taken during this treatment. After treatment patient left in no apparent distress:   Sitting in chair, Heels elevated for pressure relief, Call bell within reach, and Bed / chair alarm activated    COMMUNICATION/COLLABORATION:   The patients plan of care was discussed with: Physical therapy assistant and Registered nurse. Co-tx with PTA for increased assistance with functional mobility and chair follow d/t pt requiring increased assistance.  RN aware pt seated in chair with chair alarm on.      Nomi Sharp  Time Calculation: 45 mins    Problem: Self Care Deficits Care Plan (Adult)  Goal: *Acute Goals and Plan of Care (Insert Text)  Description: Pt will be mod I sup <> sit in prep for EOB ADLs  Pt will be SBA grooming standing at sink  Pt will be min A LE dressing sitting EOB/long sit  Pt will be SBA sit <>  prep for toileting LRAD  Pt will be CGA toileting/toilet transfer/cloth mgmt LRAD  Pt will be I following UE HEP in prep for self care tasks   Outcome: Progressing Towards Goal

## 2021-06-14 NOTE — PROGRESS NOTES
Problem: Mobility Impaired (Adult and Pediatric)  Goal: *Acute Goals and Plan of Care (Insert Text)  Description: Pt will be I with LE HEP in 7 days. Pt will perform bed mobility with mod I in 7 days. Pt will perform transfers with mod I in 7 days. Pt will amb  feet with LRAD safely with mod I in 7 days. Pt will demonstrate improvement in standing dynamic balance from min A to SBA  in 7 days. Outcome: Progressing Towards Goal   PHYSICAL THERAPY TREATMENT  Patient: Isiah Neville Candi (56 y.o. male)  Date: 6/14/2021  Diagnosis: Gait disturbance [R26.9] <principal problem not specified>       Precautions:    Chart, physical therapy assessment, plan of care and goals were reviewed. ASSESSMENT  Patient continues with skilled PT services and is progressing towards goals. Pt. Improving with mobility this session. Pt. Required less assistance with bed mobility, Tfs, and ambulation. Pt. Demonstrated decrease step length and a shuffling gt. Pattern. Pt. Needed constant vcs during gt. Pt. Leaning to the right at end of gt. Due to fatigue and needed min assist for balance. Pt. Also requires increased time to complete all tasks. Pt. Continues to increased fall risk at this time and cannot ambulate alone. Current Level of Function Impacting Discharge (mobility/balance): assistance level    Other factors to consider for discharge: safety/cognition         PLAN :  Patient continues to benefit from skilled intervention to address the above impairments. Continue treatment per established plan of care. to address goals. Recommendation for discharge: (in order for the patient to meet his/her long term goals)  Therapy up to 5 days/week in SNF setting    This discharge recommendation:  Has been made in collaboration with the attending provider and/or case management    IF patient discharges home will need the following DME: SNF       SUBJECTIVE:   Patient stated this is the year of the dragon. \"  Pt.  Sleepy initially and requiring cues to participate with RX. Pt. Agreed to work with therapy. CO RX with OT.    OBJECTIVE DATA SUMMARY:   Critical Behavior:  Neurologic State: Alert, Confused  Orientation Level: Oriented to person, Disoriented to place, Disoriented to time, Disoriented to situation  Cognition: Decreased command following     Functional Mobility Training:  Bed Mobility:     Supine to Sit: Minimum assistance;Assist x2     Scooting: Minimum assistance        Transfers:  Sit to Stand: Contact guard assistance  Stand to Sit: Minimum assistance        Bed to Chair: Contact guard assistance         Tf on and off commode with min assist  /CGA X 1. Pt. Washed hands at sink and brushed teeth. Pt. Perseverating with brushing teeth. Washed face at sink. Balance:  Sitting: Intact; Without support  Standing: Impaired; With support  Standing - Static: Constant support; Fair  Standing - Dynamic : Constant support; Fair  Ambulation/Gait Training:  Distance (ft): 75 Feet (ft)  Assistive Device: Walker, rolling  Ambulation - Level of Assistance: Minimal assistance (plus 1 for safety)                 Base of Support: Narrowed     Speed/Fadumo: Shuffled; Slow  Step Length: Right shortened;Left shortened                   Therapeutic Exercises: Attempted LE exericses with pt. And pt. Declined. Demonstrated for pt. As well and pt. Still refused to perform. Pain Ratin/10      Activity Tolerance:   Fair and requires rest breaks  Please refer to the flowsheet for vital signs taken during this treatment. After treatment patient left in no apparent distress:   Sitting in chair, Call bell within reach, Bed / chair alarm activated, and RN notified of pt. In chair with alarm set. COMMUNICATION/COLLABORATION:   The patients plan of care was discussed with: Occupational therapy assistant and Registered nurse.      Sherri Zamora   Time Calculation: 45 mins

## 2021-06-15 LAB
ANION GAP SERPL CALC-SCNC: 7 MMOL/L (ref 5–15)
BASOPHILS # BLD: 0 K/UL (ref 0–0.1)
BASOPHILS NFR BLD: 0 % (ref 0–1)
BUN SERPL-MCNC: 11 MG/DL (ref 6–20)
BUN/CREAT SERPL: 13 (ref 12–20)
CA-I BLD-MCNC: 8.3 MG/DL (ref 8.5–10.1)
CHLORIDE SERPL-SCNC: 113 MMOL/L (ref 97–108)
CO2 SERPL-SCNC: 23 MMOL/L (ref 21–32)
CREAT SERPL-MCNC: 0.88 MG/DL (ref 0.7–1.3)
DIFFERENTIAL METHOD BLD: ABNORMAL
EOSINOPHIL # BLD: 0.2 K/UL (ref 0–0.4)
EOSINOPHIL NFR BLD: 3 % (ref 0–7)
ERYTHROCYTE [DISTWIDTH] IN BLOOD BY AUTOMATED COUNT: 12.9 % (ref 11.5–14.5)
GLUCOSE BLD STRIP.AUTO-MCNC: 123 MG/DL (ref 65–117)
GLUCOSE SERPL-MCNC: 108 MG/DL (ref 65–100)
HCT VFR BLD AUTO: 38.2 % (ref 36.6–50.3)
HGB BLD-MCNC: 12.3 G/DL (ref 12.1–17)
IMM GRANULOCYTES # BLD AUTO: 0.1 K/UL (ref 0–0.04)
IMM GRANULOCYTES NFR BLD AUTO: 1 % (ref 0–0.5)
LYMPHOCYTES # BLD: 1.3 K/UL (ref 0.8–3.5)
LYMPHOCYTES NFR BLD: 20 % (ref 12–49)
MCH RBC QN AUTO: 29.7 PG (ref 26–34)
MCHC RBC AUTO-ENTMCNC: 32.2 G/DL (ref 30–36.5)
MCV RBC AUTO: 92.3 FL (ref 80–99)
MONOCYTES # BLD: 0.6 K/UL (ref 0–1)
MONOCYTES NFR BLD: 9 % (ref 5–13)
NEUTS SEG # BLD: 4.6 K/UL (ref 1.8–8)
NEUTS SEG NFR BLD: 67 % (ref 32–75)
NRBC # BLD: 0 K/UL (ref 0–0.01)
NRBC BLD-RTO: 0 PER 100 WBC
PERFORMED BY, TECHID: ABNORMAL
PLATELET # BLD AUTO: 201 K/UL (ref 150–400)
PMV BLD AUTO: 10.1 FL (ref 8.9–12.9)
POTASSIUM SERPL-SCNC: 3.5 MMOL/L (ref 3.5–5.1)
RBC # BLD AUTO: 4.14 M/UL (ref 4.1–5.7)
SODIUM SERPL-SCNC: 143 MMOL/L (ref 136–145)
WBC # BLD AUTO: 6.8 K/UL (ref 4.1–11.1)

## 2021-06-15 PROCEDURE — 82962 GLUCOSE BLOOD TEST: CPT

## 2021-06-15 PROCEDURE — 74011250636 HC RX REV CODE- 250/636: Performed by: HOSPITALIST

## 2021-06-15 PROCEDURE — 80048 BASIC METABOLIC PNL TOTAL CA: CPT

## 2021-06-15 PROCEDURE — 74011250637 HC RX REV CODE- 250/637: Performed by: HOSPITALIST

## 2021-06-15 PROCEDURE — 36415 COLL VENOUS BLD VENIPUNCTURE: CPT

## 2021-06-15 PROCEDURE — 65270000029 HC RM PRIVATE

## 2021-06-15 PROCEDURE — 85025 COMPLETE CBC W/AUTO DIFF WBC: CPT

## 2021-06-15 RX ORDER — LITHIUM CARBONATE 300 MG
300 TABLET ORAL 2 TIMES DAILY
Qty: 60 TABLET | Refills: 1 | Status: SHIPPED | OUTPATIENT
Start: 2021-06-15 | End: 2021-09-10

## 2021-06-15 RX ORDER — RISPERIDONE 3 MG/1
3 TABLET, FILM COATED ORAL 2 TIMES DAILY
Qty: 60 TABLET | Refills: 1 | Status: SHIPPED | OUTPATIENT
Start: 2021-06-15 | End: 2021-09-10

## 2021-06-15 RX ADMIN — FLUOXETINE 20 MG: 20 CAPSULE ORAL at 08:22

## 2021-06-15 RX ADMIN — BENZTROPINE MESYLATE 1 MG: 1 TABLET ORAL at 08:22

## 2021-06-15 RX ADMIN — LITHIUM CARBONATE 300 MG: 300 TABLET ORAL at 20:18

## 2021-06-15 RX ADMIN — RISPERIDONE 3 MG: 2 TABLET ORAL at 20:17

## 2021-06-15 RX ADMIN — BENZTROPINE MESYLATE 1 MG: 1 TABLET ORAL at 20:18

## 2021-06-15 RX ADMIN — LITHIUM CARBONATE 300 MG: 300 TABLET ORAL at 08:22

## 2021-06-15 RX ADMIN — BUPROPION HYDROCHLORIDE 150 MG: 150 TABLET, FILM COATED, EXTENDED RELEASE ORAL at 17:26

## 2021-06-15 RX ADMIN — SODIUM CHLORIDE 100 ML/HR: 9 INJECTION, SOLUTION INTRAVENOUS at 08:22

## 2021-06-15 RX ADMIN — RISPERIDONE 3 MG: 2 TABLET ORAL at 08:22

## 2021-06-15 NOTE — DISCHARGE SUMMARY
Physician Discharge Summary     Patient ID:    Mohamud Polanco  [id number]  60 y.o.  1965    Admit date: 6/9/2021    Discharge date : 6/15/2021    Chronic Diagnoses:    Problem List as of 6/15/2021 Date Reviewed: 6/10/2021        Codes Class Noted - Resolved    Gait disturbance ICD-10-CM: R26.9  ICD-9-CM: 781.2  6/10/2021 - Present          22    Final Diagnoses:   Gait disturbance [R26.9]    Reason for Hospitalization:  Schizophrenia  Psychosis  MARIIA  Lithium and risperidone related side effects      Hospital Course:     55M,h/o schizophrenia and psychosis with tremers and gait abnormality likely s.t litihium and risperidone contributed by MARIIA    He was prescribed prednisone and indomethacin s/t swelling of feet, along with twitching. Upon presentation, neurology was consulted- and psych. Likely s.t lithium and risperidone. He improved clinically and was recommended SNF. INSTRUCTIONS ON DISCHARGE     (1) please note that your LITHIUM was decreased to 300mg twice a day    (2) F/u with psychiatry in 1 week. Discharge Medications:   Current Discharge Medication List      CONTINUE these medications which have CHANGED    Details   lithium carbonate 300 mg tablet Take 1 Tablet by mouth two (2) times a day. Qty: 60 Tablet, Refills: 1  Start date: 6/15/2021      risperiDONE (RisperDAL) 3 mg tablet Take 1 Tablet by mouth two (2) times a day. Qty: 60 Tablet, Refills: 1  Start date: 6/15/2021         CONTINUE these medications which have NOT CHANGED    Details   haloperidol decanoate (HaldoL Decanoate) 50 mg/mL injection 100 mg Once every 2 weeks. Was supposed to have a dose on June 9th at MD office but patient couldn't walk well so he came to ER      benztropine (COGENTIN) 1 mg tablet Take 1 mg by mouth two (2) times a day. buPROPion XL (WELLBUTRIN XL) 150 mg tablet Take 150 mg by mouth every evening. FLUoxetine (PROzac) 20 mg capsule Take 20 mg by mouth three (3) times daily. Follow up Care:    1. Abelino Bentley DO in 1-2 weeks. Please call to set up an appointment shortly after discharge. Diet:  regular    Disposition:  SNF    Advanced Directive:   FULL x   DNR      Discharge Exam:  PHYSICAL EXAM:  General: Alert and awake,  Skin: Extremities and face reveal no rashes. No vazquez erythema. No telangiectasias on the chest wall. HEENT: Sclerae anicteric. Extra-occular muscles are intact. No oral ulcers. No ENT discharge. The neck is supple. Cardiovascular: Regular rate and rhythm. No murmurs, gallops, or rubs. PMI nondisplaced. Carotids without bruits. Respiratory: Comfortable breathing with no accessory muscle use. Clear breath sounds with no wheezes, rales, or rhonchi. GI: Abdomen nondistended, soft, and nontender. Normal active bowel sounds. No enlargement of the liver or spleen. No masses palpable. Rectal: Deferred   Musculoskeletal: No pitting edema of the lower legs. Extremities have good range of motion. No costovertebral tenderness. Neurological: Alert and awake and follow commands. Psychiatric: Mood appears appropriate with judgement intact. CONSULTATIONS: psychiatry and neurology    Significant Diagnostic Studies:     Radiologic Studies -   Results from Hospital Encounter encounter on 06/09/21    XR CHEST PORT    Narrative  XR CHEST PORT    Comparison:  None available    Single view: The lungs are clear. No pneumothorax or pleural effusion apparent. The heart size is normal. Calcified mediastinal lymph nodes are noted. There is  no evidence of acute cardiac decompensation. Impression  No evidence of an acute cardiopulmonary process.      CT Results  (Last 48 hours)    None              Discharge time spent 35 minutes    Signed:  Kelsi Moffett MD  6/15/2021  11:13 AM

## 2021-06-15 NOTE — PROGRESS NOTES
Patient was denied at [facility], reason stated as \"behavioral/mental health concerns. \" More referrals sent out and patient has been accepted at [facility]. CM called patients brother Shamir Wu Candi to get choice for SNF and he stated that he wanted to call both facilities to gather some information about them and he will call CM back. Phone numbers to both facilities given to . [phone]- Received call from patients brother Shamir Wu [facility] regarding SNF choice. He has chosen [facility]. CM informed him that patient will need level 2 placement which can take up to 2-3 weeks before that process is complete and before patient can be discharged.

## 2021-06-15 NOTE — DISCHARGE INSTRUCTIONS
INSTRUCTIONS ON DISCHARGE     (1) please note that your LITHIUM was decreased to 300mg twice a day    (2) F/u with psychiatry in 1 week.

## 2021-06-16 VITALS
TEMPERATURE: 98 F | OXYGEN SATURATION: 98 % | RESPIRATION RATE: 18 BRPM | BODY MASS INDEX: 24.99 KG/M2 | HEIGHT: 65 IN | HEART RATE: 82 BPM | DIASTOLIC BLOOD PRESSURE: 102 MMHG | WEIGHT: 150 LBS | SYSTOLIC BLOOD PRESSURE: 155 MMHG

## 2021-06-16 LAB
ANION GAP SERPL CALC-SCNC: 7 MMOL/L (ref 5–15)
BASOPHILS # BLD: 0 K/UL (ref 0–0.1)
BASOPHILS NFR BLD: 1 % (ref 0–1)
BUN SERPL-MCNC: 12 MG/DL (ref 6–20)
BUN/CREAT SERPL: 15 (ref 12–20)
CA-I BLD-MCNC: 8.2 MG/DL (ref 8.5–10.1)
CHLORIDE SERPL-SCNC: 112 MMOL/L (ref 97–108)
CO2 SERPL-SCNC: 25 MMOL/L (ref 21–32)
CREAT SERPL-MCNC: 0.82 MG/DL (ref 0.7–1.3)
DIFFERENTIAL METHOD BLD: ABNORMAL
EOSINOPHIL # BLD: 0.2 K/UL (ref 0–0.4)
EOSINOPHIL NFR BLD: 3 % (ref 0–7)
ERYTHROCYTE [DISTWIDTH] IN BLOOD BY AUTOMATED COUNT: 12.8 % (ref 11.5–14.5)
GLUCOSE BLD STRIP.AUTO-MCNC: 103 MG/DL (ref 65–117)
GLUCOSE BLD STRIP.AUTO-MCNC: 108 MG/DL (ref 65–117)
GLUCOSE BLD STRIP.AUTO-MCNC: 111 MG/DL (ref 65–117)
GLUCOSE SERPL-MCNC: 100 MG/DL (ref 65–100)
HCT VFR BLD AUTO: 39.4 % (ref 36.6–50.3)
HGB BLD-MCNC: 12.9 G/DL (ref 12.1–17)
IMM GRANULOCYTES # BLD AUTO: 0 K/UL (ref 0–0.04)
IMM GRANULOCYTES NFR BLD AUTO: 1 % (ref 0–0.5)
LYMPHOCYTES # BLD: 1.5 K/UL (ref 0.8–3.5)
LYMPHOCYTES NFR BLD: 21 % (ref 12–49)
MCH RBC QN AUTO: 30.2 PG (ref 26–34)
MCHC RBC AUTO-ENTMCNC: 32.7 G/DL (ref 30–36.5)
MCV RBC AUTO: 92.3 FL (ref 80–99)
MONOCYTES # BLD: 0.7 K/UL (ref 0–1)
MONOCYTES NFR BLD: 10 % (ref 5–13)
NEUTS SEG # BLD: 4.6 K/UL (ref 1.8–8)
NEUTS SEG NFR BLD: 64 % (ref 32–75)
NRBC # BLD: 0 K/UL (ref 0–0.01)
NRBC BLD-RTO: 0 PER 100 WBC
PERFORMED BY, TECHID: NORMAL
PLATELET # BLD AUTO: 179 K/UL (ref 150–400)
PMV BLD AUTO: 10.5 FL (ref 8.9–12.9)
POTASSIUM SERPL-SCNC: 3.6 MMOL/L (ref 3.5–5.1)
RBC # BLD AUTO: 4.27 M/UL (ref 4.1–5.7)
SODIUM SERPL-SCNC: 144 MMOL/L (ref 136–145)
WBC # BLD AUTO: 7 K/UL (ref 4.1–11.1)

## 2021-06-16 PROCEDURE — 82962 GLUCOSE BLOOD TEST: CPT

## 2021-06-16 PROCEDURE — 74011250637 HC RX REV CODE- 250/637: Performed by: HOSPITALIST

## 2021-06-16 PROCEDURE — 80048 BASIC METABOLIC PNL TOTAL CA: CPT

## 2021-06-16 PROCEDURE — 97530 THERAPEUTIC ACTIVITIES: CPT

## 2021-06-16 PROCEDURE — 85025 COMPLETE CBC W/AUTO DIFF WBC: CPT

## 2021-06-16 PROCEDURE — 36415 COLL VENOUS BLD VENIPUNCTURE: CPT

## 2021-06-16 PROCEDURE — 74011250636 HC RX REV CODE- 250/636: Performed by: HOSPITALIST

## 2021-06-16 RX ADMIN — RISPERIDONE 3 MG: 2 TABLET ORAL at 08:31

## 2021-06-16 RX ADMIN — LITHIUM CARBONATE 300 MG: 300 TABLET ORAL at 08:32

## 2021-06-16 RX ADMIN — SODIUM CHLORIDE 100 ML/HR: 9 INJECTION, SOLUTION INTRAVENOUS at 08:33

## 2021-06-16 RX ADMIN — FLUOXETINE 20 MG: 20 CAPSULE ORAL at 08:31

## 2021-06-16 RX ADMIN — BENZTROPINE MESYLATE 1 MG: 1 TABLET ORAL at 08:32

## 2021-06-16 NOTE — PROGRESS NOTES
OCCUPATIONAL THERAPY TREATMENT  Patient: William Chi [facility] (56 y.o. male)  Date: 6/16/2021  Diagnosis: Gait disturbance [R26.9] <principal problem not specified>       Precautions:    Chart, occupational therapy assessment, plan of care, and goals were reviewed. ASSESSMENT  Patient continues with skilled OT services and is progressing towards goals. Pt. Received semi-supine in bed and agreeable to therapy session. Pt. A& O x4. Pt. Performed bed mobility with Min A, functional transfers CGA, functional mobility Min A/CGA + 1 for line management. Completed toilet transfer with CGA. Tolerated standing at sink to complete oral care with CGA for safety. Increased functional ambulation with RW in [address] way with PTA and +1 for safety. Pt required few verbal cues to stay inside RW and navigating RW . Pt. Returned to recliner chair and performed UE therex while seated in chair demonstrating good ROM in kishore Ue's. Pt. Also completed LB dressing seated in chair with supervision. Pt would benefit from skilled OT services while at [facility] in order to increase safety and independence with self care and functional transfers/mobility. Recommend discharge to [facility] and 24/ family support when medically appropriate. PLAN :  Patient continues to benefit from skilled intervention to address the above impairments. Continue treatment per established plan of care. to address goals. Recommendation for discharge: (in order for the patient to meet his/her long term goals)  Occupational therapy at least 2 days/week in the home with 24/7 family support  Spoke with OT about change in discharge disposition. On initial eval OT recommending SNF, however pt is progressing well with therapy and no longer requires additional level of care. Pt is able to discharge with [facility] and / family assist with medically appropriate.     This discharge recommendation:  Has been made in collaboration with the attending provider and/or case management    IF patient discharges home will need the following DME: walker: rolling       SUBJECTIVE:   Patient stated  Yes I can take my socks off.     OBJECTIVE DATA SUMMARY:   Cognitive/Behavioral Status:  Neurologic State: Alert;Confused  Orientation Level: Oriented to person;Oriented to place  Cognition: Follows commands             Functional Mobility and Transfers for ADLs:  Bed Mobility:  Rolling: Minimum assistance  Supine to Sit: Minimum assistance  Sit to Supine: Minimum assistance  Scooting: Minimum assistance    Transfers:  Sit to Stand: Contact guard assistance  Functional Transfers  Bathroom Mobility: Minimum assistance  Toilet Transfer : Contact guard assistance  Bed to Chair: Contact guard assistance    Balance:  Sitting: Intact; Without support  Sitting - Static: Good (unsupported)  Sitting - Dynamic: Good (unsupported)  Standing: Impaired; With support  Standing - Static: Constant support; Fair  Standing - Dynamic : Constant support; Fair    ADL Intervention:     Grooming  Grooming Assistance: Set-up; Minimum assistance  Position Performed: Standing  Brushing Teeth: Set-up; Contact guard assistance;Minimum assistance    Lower Body Dressing Assistance  Socks: Supervision  Position Performed: Seated in chair    Toileting  Toileting Assistance: Stand-by assistance  Bladder Hygiene: Stand-by assistance      Therapeutic Exercises: JOHNNIE UE's  Exercise Sets Reps AROM AAROM PROM Self PROM Comments   Shoulder flex/ext 1 15 [x] [] [] []    Elbow flex/ext 1 15 [x] [] [] []    Wrist flex/ext 1 15 [x] [] [] []       [] [] [] []      Pain:  0/ 10 pain     Activity Tolerance:   Good  Please refer to the flowsheet for vital signs taken during this treatment.     After treatment patient left in no apparent distress:   Sitting in chair, Heels elevated for pressure relief, Call bell within reach, and Bed / chair alarm activated    COMMUNICATION/COLLABORATION:   The patients plan of care was discussed with: Physical therapy assistant and case management. Co-tx with PTA for increased assistance with transfers. Pt not longer will require assist of 2 persons and is progressing well.       Ronald Montgomery  Time Calculation: 25 mins    Problem: Self Care Deficits Care Plan (Adult)  Goal: *Acute Goals and Plan of Care (Insert Text)  Description: Pt will be mod I sup <> sit in prep for EOB ADLs  Pt will be SBA grooming standing at sink  Pt will be min A LE dressing sitting EOB/long sit  Pt will be SBA sit <>  prep for toileting LRAD  Pt will be CGA toileting/toilet transfer/cloth mgmt LRAD  Pt will be I following UE HEP in prep for self care tasks   Outcome: Progressing Towards Goal

## 2021-06-16 NOTE — PROGRESS NOTES
Progress Note    Patient: Inna Hardingu MRN: [id number]  SSN: [id number]    YOB: 1965  Age: 60 y.o. Sex: male      Admit Date: 6/9/2021    LOS: 6 days     Subjective:       55M,h/o schizophrenia and psychosis with tremers and gait abnormality likely s.t litihium and risperidone contributed by MARIIA     He was prescribed prednisone and indomethacin s/t swelling of feet, along with twitching.     Upon presentation, neurology was consulted- and psych. Likely s.t lithium and risperidone.     He improved clinically and was recommended SNF.      INSTRUCTIONS ON DISCHARGE      (1) please note that your LITHIUM was decreased to 300mg twice a day     (2) F/u with psychiatry in 1 week. Will redoo PT- he may have gained the strength to go back home with [facility]. Review of Systems:  Review of Systems   Constitutional: Negative for chills, fever and malaise/fatigue. HENT: Negative. Eyes: Negative. Respiratory: Negative. Cardiovascular: Negative. Musculoskeletal: Negative for back pain, falls, joint pain, myalgias and neck pain. Skin: Negative. Neurological: Negative for dizziness, tremors, sensory change, speech change, focal weakness and weakness. Psychiatric/Behavioral: The patient is not nervous/anxious. Objective:     Vitals:    06/15/21 1359 06/15/21 1938 06/16/21 0015 06/16/21 0811   BP: (!) 148/77 131/83 135/88 138/84   Pulse: 77 80 85 84   Resp: 18 18 16 18   Temp: 98 °F (36.7 °C) 98.1 °F (36.7 °C) 98.9 °F (37.2 °C) 98.1 °F (36.7 °C)   SpO2: 96% 97% 96% 100%   Weight:       Height:            Intake and Output:  Current Shift: No intake/output data recorded. Last three shifts: 06/14 1901 - 06/16 0700  In: -   Out: 1120 [Urine:1120]      Physical Exam:   PHYSICAL EXAM:  General: Alert and awake,  Skin: Extremities and face reveal no rashes. No vazquez erythema. No telangiectasias on the chest wall. HEENT: Sclerae anicteric. Extra-occular muscles are intact.  No oral ulcers. No ENT discharge. The neck is supple. Cardiovascular: Regular rate and rhythm. No murmurs, gallops, or rubs. PMI nondisplaced. Carotids without bruits. Respiratory: Comfortable breathing with no accessory muscle use. Clear breath sounds with no wheezes, rales, or rhonchi. GI: Abdomen nondistended, soft, and nontender. Normal active bowel sounds. No enlargement of the liver or spleen. No masses palpable. Rectal: Deferred   Musculoskeletal: No pitting edema of the lower legs. Extremities have good range of motion. No costovertebral tenderness. Neurological: Alert and awake and follow commands. Psychiatric: Mood appears appropriate with judgement     Lab/Data Review:  Recent Results (from the past 24 hour(s))   GLUCOSE, POC    Collection Time: 06/15/21  7:41 PM   Result Value Ref Range    Glucose (POC) 123 (H) 65 - 117 mg/dL    Performed by Mariia Bravo    CBC WITH AUTOMATED DIFF    Collection Time: 06/16/21  7:03 AM   Result Value Ref Range    WBC 7.0 4.1 - 11.1 K/uL    RBC 4.27 4. 10 - 5.70 M/uL    HGB 12.9 12.1 - 17.0 g/dL    HCT 39.4 36.6 - 50.3 %    MCV 92.3 80.0 - 99.0 FL    MCH 30.2 26.0 - 34.0 PG    MCHC 32.7 30.0 - 36.5 g/dL    RDW 12.8 11.5 - 14.5 %    PLATELET 459 535 - 187 K/uL    MPV 10.5 8.9 - 12.9 FL    NRBC 0.0 0.0  WBC    ABSOLUTE NRBC 0.00 0.00 - 0.01 K/uL    NEUTROPHILS 64 32 - 75 %    LYMPHOCYTES 21 12 - 49 %    MONOCYTES 10 5 - 13 %    EOSINOPHILS 3 0 - 7 %    BASOPHILS 1 0 - 1 %    IMMATURE GRANULOCYTES 1 (H) 0 - 0.5 %    ABS. NEUTROPHILS 4.6 1.8 - 8.0 K/UL    ABS. LYMPHOCYTES 1.5 0.8 - 3.5 K/UL    ABS. MONOCYTES 0.7 0.0 - 1.0 K/UL    ABS. EOSINOPHILS 0.2 0.0 - 0.4 K/UL    ABS. BASOPHILS 0.0 0.0 - 0.1 K/UL    ABS. IMM.  GRANS. 0.0 0.00 - 0.04 K/UL    DF AUTOMATED     METABOLIC PANEL, BASIC    Collection Time: 06/16/21  7:03 AM   Result Value Ref Range    Sodium 144 136 - 145 mmol/L    Potassium 3.6 3.5 - 5.1 mmol/L    Chloride 112 (H) 97 - 108 mmol/L    CO2 25 21 - 32 mmol/L    Anion gap 7 5 - 15 mmol/L    Glucose 100 65 - 100 mg/dL    BUN 12 6 - 20 mg/dL    Creatinine 0.82 0.70 - 1.30 mg/dL    BUN/Creatinine ratio 15 12 - 20      GFR est AA >60 >60 ml/min/1.73m2    GFR est non-AA >60 >60 ml/min/1.73m2    Calcium 8.2 (L) 8.5 - 10.1 mg/dL   GLUCOSE, POC    Collection Time: 06/16/21  8:08 AM   Result Value Ref Range    Glucose (POC) 103 65 - 117 mg/dL    Performed by SIRI MOON          Assessment and plan:      Schizophrenia  Psychosis  MARIIA  Lithium and risperidone related side effects    Psychiatry consulted for further recs regarding potential side effects and/ot potential options. Li decreased to 300 BID, Risperdal also decreased. We decreased his prozac to once daily. Neurology and psych eval appreciated  Ativan prn. Neuro appreciated.       Signed By: Elpidio Landry MD     June 16, 2021

## 2021-06-16 NOTE — PROGRESS NOTES
PT/OT rec HH with walker and with 24/7 supervision. CM spoke with patients brother Jose Santacruz [facility] @ [phone] and confirmed that he is going to be able to supervise patient 24/7. He has agreed with home health and gave verbal consent for CM to send referrals. [place] referral sent out and rolling walker referral sent to [facility]. Patient will need rolling walker delivered to the hospital before he is discharged. Patients brother can pick patient up when patient is ready for discharge today.

## 2021-06-16 NOTE — PROGRESS NOTES
PHYSICAL THERAPY TREATMENT  Patient: Kayli Christopher (56 y.o. male)  Date: 6/16/2021  Diagnosis: Gait disturbance [R26.9] <principal problem not specified>       Precautions:    Chart, physical therapy assessment, plan of care and goals were reviewed. ASSESSMENT  Patient continues with skilled PT services and is progressing towards goals. Pt. Semi supine in bed upon arrival and agreeable to co treat with jones for patient and therapist safety. Pt. Very shaky with sup to sit transfer. Requires VC for pacing . Able to ambulate 200' with RW and therapist negotiating [place]. Required Min a for bed mobility and transfers. Tolerated seated LE TE. Recommend DC to Home with 24/7 supervision with HHPT. Current Level of Function Impacting Discharge (mobility/balance): Cognition, endurance, balance, strength. Other factors to consider for discharge: PMH         PLAN :  Patient continues to benefit from skilled intervention to address the above impairments. Continue treatment per established plan of care. to address goals. Recommendation for discharge: (in order for the patient to meet his/her long term goals)  To be determined: Home with 24/7 supervision with HHPT    This discharge recommendation:  Has been made in collaboration with the attending provider and/or case management    IF patient discharges home will need the following DME: rolling walker       SUBJECTIVE:   Patient stated im okay.     OBJECTIVE DATA SUMMARY:   Critical Behavior:  Neurologic State: Alert, Confused  Orientation Level: Oriented to person, Oriented to place  Cognition: Follows commands     Functional Mobility Training:  Bed Mobility:  Rolling: Minimum assistance  Supine to Sit: Minimum assistance  Sit to Supine: Minimum assistance  Scooting: Minimum assistance        Transfers:  Sit to Stand: Contact guard assistance  Stand to Sit: Contact guard assistance  Stand Pivot Transfers: Contact guard assistance Balance:  Sitting: Intact; Without support  Sitting - Static: Good (unsupported)  Sitting - Dynamic: Good (unsupported)  Standing: Impaired; With support  Standing - Static: Constant support; Fair  Standing - Dynamic : Constant support; Fair  Ambulation/Gait Training:  Distance (ft): 200 Feet (ft)  Assistive Device: Walker, rolling;Gait belt  Ambulation - Level of Assistance: Minimal assistance                 Base of Support: Narrowed     Speed/Fadumo: Slow;Shuffled  Step Length: Right shortened;Left shortened                    Stairs: Therapeutic Exercises:   Therapeutic Exercises:       EXERCISE   Sets   Reps   Active Active Assist   Passive Self ROM   Comments   Ankle Pumps  20 [x] [] [] []    Quad Sets/Glut Sets   [] [] [] []    Hamstring Sets   [] [] [] []    Short Arc Quads   [] [] [] []    Heel Slides   [] [] [] []    Straight Leg Raises   [] [] [] []    Hip abd/add  20 [x] [] [] []    Long Arc Quads  20 [x] [] [] []    Marching  20 [x] [] [] []       [] [] [] []        Pain Ratin    Activity Tolerance:   Fair  Please refer to the flowsheet for vital signs taken during this treatment. After treatment patient left in no apparent distress:   Supine in bed    COMMUNICATION/COLLABORATION:   The patients plan of care was discussed with: Physical therapy assistant.      Hector Hidalgo PTA   Time Calculation: 24 mins

## 2021-06-16 NOTE — PROGRESS NOTES
Patient discharged home with home health. Patient IV discontinued. VSS. Belongings packed and wheeled down to ride home with brother. Brother picked up wheelchair prior to arrival at hospital. Patient dressed. No further concerns or questions at this time.

## 2021-06-16 NOTE — PROGRESS NOTES
Progress Note    Patient: Cathleen Christopher MRN: [id number]  SSN: [id number]    YOB: 1965  Age: 60 y.o. Sex: male      Admit Date: 6/9/2021    LOS: 6 days     Subjective:       55M,h/o schizophrenia and psychosis with tremers and gait abnormality likely s.t litihium and risperidone contributed by MARIIA     He was prescribed prednisone and indomethacin s/t swelling of feet, along with twitching.     Upon presentation, neurology was consulted- and psych. Likely s.t lithium and risperidone.     He improved clinically and was recommended SNF.      INSTRUCTIONS ON DISCHARGE      (1) please note that your LITHIUM was decreased to 300mg twice a day     (2) F/u with psychiatry in 1 week. Will redoo PT- he may have gained the strength to go back home with [facility]. Review of Systems:  Review of Systems   Constitutional: Negative for chills, fever and malaise/fatigue. HENT: Negative. Eyes: Negative. Respiratory: Negative. Cardiovascular: Negative. Musculoskeletal: Negative for back pain, falls, joint pain, myalgias and neck pain. Skin: Negative. Neurological: Negative for dizziness, tremors, sensory change, speech change, focal weakness and weakness. Psychiatric/Behavioral: The patient is not nervous/anxious. Objective:     Vitals:    06/15/21 1359 06/15/21 1938 06/16/21 0015 06/16/21 0811   BP: (!) 148/77 131/83 135/88 138/84   Pulse: 77 80 85 84   Resp: 18 18 16 18   Temp: 98 °F (36.7 °C) 98.1 °F (36.7 °C) 98.9 °F (37.2 °C) 98.1 °F (36.7 °C)   SpO2: 96% 97% 96% 100%   Weight:       Height:            Intake and Output:  Current Shift: No intake/output data recorded. Last three shifts: 06/14 1901 - 06/16 0700  In: -   Out: 1120 [Urine:1120]      Physical Exam:   PHYSICAL EXAM:  General: Alert and awake,  Skin: Extremities and face reveal no rashes. No vazquez erythema. No telangiectasias on the chest wall. HEENT: Sclerae anicteric. Extra-occular muscles are intact.  No oral ulcers. No ENT discharge. The neck is supple. Cardiovascular: Regular rate and rhythm. No murmurs, gallops, or rubs. PMI nondisplaced. Carotids without bruits. Respiratory: Comfortable breathing with no accessory muscle use. Clear breath sounds with no wheezes, rales, or rhonchi. GI: Abdomen nondistended, soft, and nontender. Normal active bowel sounds. No enlargement of the liver or spleen. No masses palpable. Rectal: Deferred   Musculoskeletal: No pitting edema of the lower legs. Extremities have good range of motion. No costovertebral tenderness. Neurological: Alert and awake and follow commands. Psychiatric: Mood appears appropriate with judgement     Lab/Data Review:  Recent Results (from the past 24 hour(s))   GLUCOSE, POC    Collection Time: 06/15/21  7:41 PM   Result Value Ref Range    Glucose (POC) 123 (H) 65 - 117 mg/dL    Performed by Carola Sarabia    CBC WITH AUTOMATED DIFF    Collection Time: 06/16/21  7:03 AM   Result Value Ref Range    WBC 7.0 4.1 - 11.1 K/uL    RBC 4.27 4. 10 - 5.70 M/uL    HGB 12.9 12.1 - 17.0 g/dL    HCT 39.4 36.6 - 50.3 %    MCV 92.3 80.0 - 99.0 FL    MCH 30.2 26.0 - 34.0 PG    MCHC 32.7 30.0 - 36.5 g/dL    RDW 12.8 11.5 - 14.5 %    PLATELET 420 436 - 416 K/uL    MPV 10.5 8.9 - 12.9 FL    NRBC 0.0 0.0  WBC    ABSOLUTE NRBC 0.00 0.00 - 0.01 K/uL    NEUTROPHILS 64 32 - 75 %    LYMPHOCYTES 21 12 - 49 %    MONOCYTES 10 5 - 13 %    EOSINOPHILS 3 0 - 7 %    BASOPHILS 1 0 - 1 %    IMMATURE GRANULOCYTES 1 (H) 0 - 0.5 %    ABS. NEUTROPHILS 4.6 1.8 - 8.0 K/UL    ABS. LYMPHOCYTES 1.5 0.8 - 3.5 K/UL    ABS. MONOCYTES 0.7 0.0 - 1.0 K/UL    ABS. EOSINOPHILS 0.2 0.0 - 0.4 K/UL    ABS. BASOPHILS 0.0 0.0 - 0.1 K/UL    ABS. IMM.  GRANS. 0.0 0.00 - 0.04 K/UL    DF AUTOMATED     METABOLIC PANEL, BASIC    Collection Time: 06/16/21  7:03 AM   Result Value Ref Range    Sodium 144 136 - 145 mmol/L    Potassium 3.6 3.5 - 5.1 mmol/L    Chloride 112 (H) 97 - 108 mmol/L    CO2 25 21 - 32 mmol/L    Anion gap 7 5 - 15 mmol/L    Glucose 100 65 - 100 mg/dL    BUN 12 6 - 20 mg/dL    Creatinine 0.82 0.70 - 1.30 mg/dL    BUN/Creatinine ratio 15 12 - 20      GFR est AA >60 >60 ml/min/1.73m2    GFR est non-AA >60 >60 ml/min/1.73m2    Calcium 8.2 (L) 8.5 - 10.1 mg/dL   GLUCOSE, POC    Collection Time: 06/16/21  8:08 AM   Result Value Ref Range    Glucose (POC) 103 65 - 117 mg/dL    Performed by SIRI MOON          Assessment and plan:      Schizophrenia  Psychosis  MARIIA  Lithium and risperidone related side effects    Psychiatry consulted for further recs regarding potential side effects and/ot potential options. Li decreased to 300 BID, Risperdal also decreased. We decreased his prozac to once daily. Neurology and psych eval appreciated  Ativan prn. Neuro appreciated. Will redoo PT- he may have gained the strength to go back home with [facility].      Signed By: Ashlee Healy MD     June 16, 2021

## 2021-06-16 NOTE — PROGRESS NOTES
Called Dr. Devora Solares office & Gavin fletcher office stated patient has to call for appointment. Dr. Jeison Lebron office stated doctor is not taking new patient.

## 2021-06-16 NOTE — PROGRESS NOTES
Patient has been accepted with [facility] services, [facility] date 6/18/21. CM spoke with patients brother Peter Little [facility], and he is going to go to [facility] to pay co-pay and  RW before he comes to pick patient up. He stated that pick-up ETA is 4pm. Primary RN made aware. Discharge plan of care/case management plan validated with provider discharge order.

## 2021-06-21 ENCOUNTER — CLINICAL SUPPORT (OUTPATIENT)
Dept: FAMILY MEDICINE CLINIC | Age: 56
End: 2021-06-21
Payer: MEDICARE

## 2021-06-21 VITALS
OXYGEN SATURATION: 97 % | DIASTOLIC BLOOD PRESSURE: 81 MMHG | HEIGHT: 65 IN | TEMPERATURE: 98.6 F | HEART RATE: 83 BPM | BODY MASS INDEX: 24.49 KG/M2 | RESPIRATION RATE: 18 BRPM | WEIGHT: 147 LBS | SYSTOLIC BLOOD PRESSURE: 119 MMHG

## 2021-06-21 DIAGNOSIS — F20.9 SCHIZOPHRENIA, UNSPECIFIED TYPE (HCC): Primary | ICD-10-CM

## 2021-06-21 PROCEDURE — 96372 THER/PROPH/DIAG INJ SC/IM: CPT | Performed by: FAMILY MEDICINE

## 2021-06-21 RX ADMIN — HALOPERIDOL DECANOATE 100 MG: 100 INJECTION INTRAMUSCULAR at 15:00

## 2021-06-21 NOTE — PROGRESS NOTES
Chief Complaint   Patient presents with    Injection     Haloperidol 100 mg / ml      Patient came in to the office today for  His haldol 100 mg / ml injection. Injection was given via IM injection. Patient tolerated injection well.       Visit Vitals  /81 (BP 1 Location: Left arm, BP Patient Position: Sitting, BP Cuff Size: Adult)   Pulse 83   Temp 98.6 °F (37 °C) (Temporal)   Resp 18   Ht 5' 5\" (1.651 m)   Wt 147 lb (66.7 kg)   SpO2 97%   BMI 24.46 kg/m²

## 2021-06-25 NOTE — PROGRESS NOTES
Physician Progress Note      PATIENT:               BRICE [facility]  CSN #:                  [id number]  :                       1965  ADMIT DATE:       2021 10:30 PM  [address] [place] DATE:        2021 4:15 PM  RESPONDING  PROVIDER #:        Ankita Gonzalez MD          QUERY TEXT:    Patient admitted with side effects from lithium and risperidone. Noted documentation of Acute Kidney Injury in DC Summary dated 6/15. In order to support the diagnosis of MARIIA, please include additional clinical indicators in your documentation. Or please document if the diagnosis of MARIIA has been ruled out after further study. The medical record reflects the following:  Risk Factors: taking Lithium, Risperidone    Clinical Indicators:    DC Summary-  55M,h/o schizophrenia and psychosis with tremors and gait abnormality likely s.t litihium and risperidone contributed by MARIIA    Cr 1.10-> 1.00-> 0.88-> 0.82   (Cr  was 1.00) and (Cr 20 was 1.16)  GFR >60    Treatment: Monitor BMP; decreased Lithium dose; decreased Risperidone dose      Defined by Kidney Disease Improving Global Outcomes (KDIGO) clinical practice guideline for acute kidney injury:  -Increase in SCr by greater than or equal to 0.3 mg/dl within 48 hours; or  -Increase or decrease in SCr to greater than or equal to 1.5 times baseline, which is known or presumed to have occurred within the prior 7 days; or  -Urine volume < 0.5ml/kg/h for 6 hours      Thank you,  Betsy Holt, RN, BSN, [place], [place]  Clinical Documentation  [phone]  Options provided:  -- Acute kidney injury evidenced by, Please document evidence as well as baseline creatinine, if known. -- Currently resolved acute kidney injury was evidenced by, Please document evidence as well as baseline creatinine, if known.   -- Acute kidney injury ruled out after study  -- Other - I will add my own diagnosis  -- Disagree - Not applicable / Not valid  -- Disagree - Clinically unable to determine / Unknown  -- Refer to Clinical Documentation Reviewer    PROVIDER RESPONSE TEXT:    Acute kidney injury was ruled out after study.     Query created by: Jessica Huston on 6/18/2021 6:53 AM      Electronically signed by:  Mani Diaz MD 6/25/2021 4:53 PM

## 2021-07-13 ENCOUNTER — OFFICE VISIT (OUTPATIENT)
Dept: FAMILY MEDICINE CLINIC | Age: 56
End: 2021-07-13
Payer: MEDICARE

## 2021-07-13 VITALS
DIASTOLIC BLOOD PRESSURE: 74 MMHG | SYSTOLIC BLOOD PRESSURE: 109 MMHG | OXYGEN SATURATION: 96 % | WEIGHT: 147 LBS | HEART RATE: 81 BPM | BODY MASS INDEX: 24.49 KG/M2 | TEMPERATURE: 98 F | HEIGHT: 65 IN

## 2021-07-13 DIAGNOSIS — R25.1 TREMOR: ICD-10-CM

## 2021-07-13 DIAGNOSIS — R26.9 GAIT DISTURBANCE: ICD-10-CM

## 2021-07-13 DIAGNOSIS — Z09 HOSPITAL DISCHARGE FOLLOW-UP: Primary | ICD-10-CM

## 2021-07-13 DIAGNOSIS — R41.3 MEMORY IMPAIRMENT: ICD-10-CM

## 2021-07-13 DIAGNOSIS — F20.9 SCHIZOPHRENIA, UNSPECIFIED TYPE (HCC): ICD-10-CM

## 2021-07-13 DIAGNOSIS — E11.9 TYPE 2 DIABETES MELLITUS WITHOUT COMPLICATION, WITHOUT LONG-TERM CURRENT USE OF INSULIN (HCC): ICD-10-CM

## 2021-07-13 PROCEDURE — 99213 OFFICE O/P EST LOW 20 MIN: CPT | Performed by: FAMILY MEDICINE

## 2021-07-13 PROCEDURE — G8420 CALC BMI NORM PARAMETERS: HCPCS | Performed by: FAMILY MEDICINE

## 2021-07-13 PROCEDURE — 3044F HG A1C LEVEL LT 7.0%: CPT | Performed by: FAMILY MEDICINE

## 2021-07-13 PROCEDURE — G8432 DEP SCR NOT DOC, RNG: HCPCS | Performed by: FAMILY MEDICINE

## 2021-07-13 PROCEDURE — 96372 THER/PROPH/DIAG INJ SC/IM: CPT | Performed by: FAMILY MEDICINE

## 2021-07-13 PROCEDURE — G8427 DOCREV CUR MEDS BY ELIG CLIN: HCPCS | Performed by: FAMILY MEDICINE

## 2021-07-13 PROCEDURE — 2022F DILAT RTA XM EVC RTNOPTHY: CPT | Performed by: FAMILY MEDICINE

## 2021-07-13 PROCEDURE — 1111F DSCHRG MED/CURRENT MED MERGE: CPT | Performed by: FAMILY MEDICINE

## 2021-07-13 PROCEDURE — 3017F COLORECTAL CA SCREEN DOC REV: CPT | Performed by: FAMILY MEDICINE

## 2021-07-13 RX ORDER — HALOPERIDOL DECANOATE 100 MG/ML
100 INJECTION INTRAMUSCULAR ONCE
Status: COMPLETED | OUTPATIENT
Start: 2021-07-13 | End: 2021-07-13

## 2021-07-13 RX ADMIN — HALOPERIDOL DECANOATE 100 MG: 100 INJECTION INTRAMUSCULAR at 15:21

## 2021-07-13 NOTE — PROGRESS NOTES
IDENTIFYING INFORMATION:      Graciela Christopher , 60 y.o., male  [address],  [address], [address]     Medical Record Number: [id number]          CHIEF COMPLAINT:     Chief Complaint   Patient presents with   [facility] Follow Up     Seen at [facility]. Discharged on 6/16/21. HISTORY OF PRESENT ILLNESS:    Shirley Christopher is a 60 y.o. male  has a past medical history of Anxiety, Depression, Intellectual disability, Schizoaffective disorder, bipolar type ([address] and Schizophrenia ([address] .  he comes in for follow up from hospital and was there for a week of so and had some excessive shaking and difficulty with gait and standing. They gave him risperdone and decreased his lithium. Biggest concern , brother is present and give history , is that he needs the haloperidol every 14 days, 21 dauys is too long. Gets more depressed and harder to take care of. Also doenst need home health, walking fine and he lives with brother, takes care of him, thinks he is doing much better overall. PAST MEDICAL HISTORY:     Past Medical History:   Diagnosis Date    Anxiety     Depression     Intellectual disability     Schizoaffective disorder, bipolar type ([address]     Schizophrenia ([address]        MEDICATIONS:     Current Outpatient Medications on File Prior to Visit   Medication Sig Dispense Refill    lithium carbonate 300 mg tablet Take 1 Tablet by mouth two (2) times a day. 60 Tablet 1    risperiDONE (RisperDAL) 3 mg tablet Take 1 Tablet by mouth two (2) times a day. 60 Tablet 1    haloperidol decanoate (HaldoL Decanoate) 50 mg/mL injection 100 mg Once every 2 weeks. Was supposed to have a dose on June 9th at MD office but patient couldn't walk well so he came to ER.  7/13/21 - changed from every 2  Weeks to every 3 weeks.  benztropine (COGENTIN) 1 mg tablet Take 1 mg by mouth two (2) times a day.  buPROPion XL (WELLBUTRIN XL) 150 mg tablet Take 1 Tab by mouth daily.       temazepam (RESTORIL) 30 mg capsule Take 1 Cap by mouth nightly.  [DISCONTINUED] benztropine (COGENTIN) 1 mg tablet Take 1 mg by mouth two (2) times a day.  [DISCONTINUED] buPROPion XL (WELLBUTRIN XL) 150 mg tablet Take 150 mg by mouth every evening.  [DISCONTINUED] FLUoxetine (PROzac) 20 mg capsule Take 20 mg by mouth three (3) times daily.  FLUoxetine (PROzac) 20 mg capsule Take 20 mg by mouth Before breakfast, lunch, and dinner. (Patient not taking: Reported on 7/13/2021)      [DISCONTINUED] risperiDONE (RisperDAL) 3 mg tablet Take 3 mg by mouth two (2) times a day. Indications: schizophrenia      [DISCONTINUED] indomethacin SR (INDOCIN SR) 75 mg SR capsule Take 1 Capsule by mouth daily for 90 days. (Patient not taking: Reported on 7/13/2021) 30 Capsule 2    [DISCONTINUED] predniSONE (DELTASONE) 5 mg tablet Take 1 Tablet by mouth two (2) times a day. (Patient not taking: Reported on 7/13/2021) 20 Tablet 0    [DISCONTINUED] colchicine 0.6 mg tablet Take 2 tablets (1.2 mg) now and then 1 tablet (0.6 mg) in 1 hour by mouth (Patient not taking: Reported on 7/13/2021) 3 Tablet 0    haloperidol decanoate (HALDOL DECANOATE) 100 mg/mL injection 1 mL by IntraMUSCular route every twenty-eight (28) days. (Patient not taking: Reported on 7/13/2021) 1 mL 0    [DISCONTINUED] haloperidoL (HALDOL) 5 mg tablet Take 1 Tab by mouth daily. (Patient not taking: Reported on 5/28/2021)      [DISCONTINUED] lithium carbonate 300 mg tablet Take 1 Tablet by mouth two (2) times a day.        Current Facility-Administered Medications on File Prior to Visit   Medication Dose Route Frequency Provider Last Rate Last Admin    haloperidol decanoate (HALDOL DECANOATE) 100 mg/mL LA injection 100 mg  100 mg IntraMUSCular Q28D Shannan Hima, DO   100 mg at 05/26/21 1533    haloperidol decanoate (HALDOL DECANOATE) 100 mg/mL LA injection 100 mg  100 mg IntraMUSCular Q28D Sal Gutiérrez NP   100 mg at 05/12/21 1453    haloperidol decanoate (HALDOL DECANOATE) 100 mg/mL LA injection 100 mg  100 mg IntraMUSCular Q28D Arlice Foster, DO   100 mg at 06/21/21 1500    haloperidol decanoate (HALDOL DECANOATE) 100 mg/mL LA injection 100 mg  100 mg IntraMUSCular Q28D Arlice Foster, DO   100 mg at 02/17/21 1636    haloperidol decanoate (HALDOL DECANOATE) 100 mg/mL LA injection 100 mg  100 mg IntraMUSCular Q28D Arlice Foster, DO        haloperidol decanoate (HALDOL DECANOATE) 100 mg/mL LA injection 100 mg  100 mg IntraMUSCular Q28D Arlice Foster, DO   100 mg at 05/12/21 1218    haloperidol decanoate (HALDOL DECANOATE) 100 mg/mL LA injection 100 mg  100 mg IntraMUSCular Q28D Kourtney Jean NP   100 mg at 01/06/21 1507    haloperidol decanoate (HALDOL DECANOATE) 100 mg/mL LA injection 100 mg  100 mg IntraMUSCular Q28D Arlice Foster, DO           ALLERGIES:    No Known Allergies      SOCIAL HISTORY:     Social History     Tobacco Use    Smoking status: Never Smoker    Smokeless tobacco: Never Used   Vaping Use    Vaping Use: Never used   Substance Use Topics    Alcohol use: Never    Drug use: Never       SURGICAL HISTORY:    History reviewed. No pertinent surgical history. FAMILY HISTORY:    Family History   Problem Relation Age of Onset    Cancer Father         bone    Pancreatic Cancer Father     Dementia Mother          REVIEW OF SYSTEMS:    I personally collected this information from all available source present (patient/others in room and records available) -ZAHRAA  His brother was present and helps with the review of systems. Review of Systems   Constitutional: Positive for malaise/fatigue. Negative for chills, diaphoresis, fever and weight loss. HENT: Negative for congestion, ear pain, hearing loss, nosebleeds, sinus pain, sore throat and tinnitus. Eyes: Negative for blurred vision, double vision, photophobia and pain. Respiratory: Negative for cough, sputum production and shortness of breath.     Cardiovascular: Negative for chest pain, palpitations, orthopnea, claudication, leg swelling and PND. Gastrointestinal: Negative for abdominal pain, blood in stool, constipation, diarrhea, heartburn, melena, nausea and vomiting. Genitourinary: Negative for dysuria, frequency and urgency. Musculoskeletal: Negative for back pain, falls, joint pain, myalgias and neck pain. Skin: Negative for itching and rash. Neurological: Positive for tremors and weakness. Negative for dizziness, tingling, sensory change, focal weakness and headaches. Psychiatric/Behavioral: Positive for depression. Negative for suicidal ideas. The patient is not nervous/anxious and does not have insomnia. PHYSICAL EXAMINATION:    Vital Signs:    Visit Vitals  /74 (BP 1 Location: Left upper arm, BP Patient Position: Sitting)   Pulse 81   Temp 98 °F (36.7 °C) (Temporal)   Ht 5' 5\" (1.651 m)   Wt 147 lb (66.7 kg)   SpO2 96%   BMI 24.46 kg/m²         Wt Readings from Last 3 Encounters:   07/13/21 147 lb (66.7 kg)   06/21/21 147 lb (66.7 kg)   06/09/21 150 lb (68 kg)     BP Readings from Last 3 Encounters:   07/13/21 109/74   06/21/21 119/81   06/16/21 (!) 155/102         Physical Exam  Constitutional:       Appearance: He is not ill-appearing or toxic-appearing. Eyes:      General: No scleral icterus. Extraocular Movements: Extraocular movements intact. Conjunctiva/sclera: Conjunctivae normal.   Cardiovascular:      Rate and Rhythm: Normal rate and regular rhythm. Heart sounds: No murmur heard. No friction rub. No gallop. Pulmonary:      Effort: Pulmonary effort is normal.      Breath sounds: Normal breath sounds. No wheezing, rhonchi or rales. Abdominal:      Palpations: Abdomen is soft. Tenderness: There is no abdominal tenderness. There is no guarding. Musculoskeletal:         General: No deformity. Cervical back: No tenderness. Right lower leg: No edema. Left lower leg: No edema. Lymphadenopathy:      Cervical: No cervical adenopathy.    Skin: Coloration: Skin is not jaundiced. Findings: No erythema or rash. Neurological:      General: No focal deficit present. Mental Status: He is alert and oriented to person, place, and time. Mental status is at baseline. Gait: Gait abnormal.      Comments: Is steady he walked 12 feet turned around and walked back without any difficulty without ataxia. It is narrow based and his armswing is about new but he is very steady on his feet and has no issues with standing. He got up walked around came back all within 12 seconds. Psychiatric:         Mood and Affect: Mood normal.         Behavior: Behavior normal.         Thought Content: Thought content normal.         Judgment: Judgment normal.           ASSESSMENT/PLAN:      Discussion (regarding today's visit with [facility] RYAN Christopher);    ICD-10-CM ICD-9-CM    1. Hospital discharge follow-up  Z09 V67.59 MO DISCHARGE MEDS RECONCILED W/ CURRENT OUTPATIENT MED LIST   2. Schizophrenia, unspecified type ([facility]  F20.9 295.90 haloperidol decanoate (HALDOL DECANOATE) 100 mg/mL LA injection 100 mg   3. Type 2 diabetes mellitus without complication, without long-term current use of insulin ([facility])  U42.2 856.27 METABOLIC PANEL, COMPREHENSIVE      CBC WITH AUTOMATED DIFF   4. Gait disturbance  P01.3 439.2 METABOLIC PANEL, COMPREHENSIVE      CBC WITH AUTOMATED DIFF   5. Memory impairment  R41.3 780.93    6. Tremor  L40.3 425.4 METABOLIC PANEL, COMPREHENSIVE      CBC WITH AUTOMATED DIFF      Patient brother says he does not need home health at this time   He is due for his haloperidol shot today   I would be willing to agree that with the decrease in lithium he would certainly need the haloperidol every 14 days.  However, I will leave that to psychiatry for now   Lab work and imaging from hospital reviewed  [address] [place] [address] reviewed each diagnosis listed for today's visit.    WE reviewed medications, treatment, testing such as labs, imagine, referrals and when to call regarding results and appointments.  Reminded patient to keep any and all appointments with specialists, labs, imaging.  Reminded patient to make sure we get copies of any specialists care, labs and imaging.  Reminded patient to call of come by the office if there are any concerns, questions , comments or problems.  The patient verbalized understanding of the care plan and all questions were answered to the patient's satisfaction prior to leaving the office.  The patient was told that failure to comply with recommended testing could result in abnormal health consequences.  The patient was instructed to have yearly routine health maintenance including but not limited to age appropriate vaccines, testing, screening exams.  ALL questions were answered to his satisfaction before leaving the office. The patient actively participated in medical decision making. FOLLOW UP:     Patient knows to keep any and all future visits scheduled unless told otherwise. Patient knows to call, come back if any concerns, questions, comments or problems arise. King Wood, DO    This visit was reviewed and signed electronically. It was been completed with voice recognition software and hand typing. It may have syntax and spelling errors despite editing.

## 2021-07-13 NOTE — PROGRESS NOTES
1. Have you been to the ER, urgent care clinic since your last visit? Hospitalized since your last visit?see note below     2. Have you seen or consulted any other health care providers outside of the [address] [place] [address] since your last visit? Include any pap smears or colon screening. Psych    Chief Complaint   Patient presents with   [facility] Follow Up     Seen at [facility]. Discharged on 6/16/21.        Visit Vitals  /74 (BP 1 Location: Left upper arm, BP Patient Position: Sitting)   Pulse 81   Temp 98 °F (36.7 °C) (Temporal)   Ht 5' 5\" (1.651 m)   Wt 147 lb (66.7 kg)   SpO2 96%   BMI 24.46 kg/m²

## 2021-07-27 ENCOUNTER — CLINICAL SUPPORT (OUTPATIENT)
Dept: FAMILY MEDICINE CLINIC | Age: 56
End: 2021-07-27
Payer: MEDICARE

## 2021-07-27 VITALS
SYSTOLIC BLOOD PRESSURE: 114 MMHG | DIASTOLIC BLOOD PRESSURE: 75 MMHG | BODY MASS INDEX: 24.49 KG/M2 | HEART RATE: 84 BPM | OXYGEN SATURATION: 96 % | HEIGHT: 65 IN | TEMPERATURE: 98 F | WEIGHT: 147 LBS

## 2021-07-27 DIAGNOSIS — F20.9 SCHIZOPHRENIA, UNSPECIFIED TYPE (HCC): Primary | ICD-10-CM

## 2021-07-27 PROCEDURE — 96372 THER/PROPH/DIAG INJ SC/IM: CPT | Performed by: FAMILY MEDICINE

## 2021-07-27 RX ORDER — HALOPERIDOL DECANOATE 100 MG/ML
100 INJECTION INTRAMUSCULAR ONCE
Status: COMPLETED | OUTPATIENT
Start: 2021-07-27 | End: 2021-07-27

## 2021-07-27 RX ADMIN — HALOPERIDOL DECANOATE 100 MG: 100 INJECTION INTRAMUSCULAR at 15:19

## 2021-08-10 ENCOUNTER — CLINICAL SUPPORT (OUTPATIENT)
Dept: FAMILY MEDICINE CLINIC | Age: 56
End: 2021-08-10
Payer: MEDICARE

## 2021-08-10 VITALS
SYSTOLIC BLOOD PRESSURE: 109 MMHG | HEIGHT: 65 IN | OXYGEN SATURATION: 95 % | WEIGHT: 147 LBS | HEART RATE: 71 BPM | TEMPERATURE: 98.7 F | RESPIRATION RATE: 18 BRPM | DIASTOLIC BLOOD PRESSURE: 75 MMHG | BODY MASS INDEX: 24.49 KG/M2

## 2021-08-10 DIAGNOSIS — F20.9 SCHIZOPHRENIA, UNSPECIFIED TYPE (HCC): Primary | ICD-10-CM

## 2021-08-10 PROCEDURE — 96372 THER/PROPH/DIAG INJ SC/IM: CPT | Performed by: NURSE PRACTITIONER

## 2021-08-10 RX ORDER — HALOPERIDOL DECANOATE 100 MG/ML
100 INJECTION INTRAMUSCULAR
Status: DISCONTINUED | OUTPATIENT
Start: 2021-08-10 | End: 2021-09-10

## 2021-08-10 RX ADMIN — HALOPERIDOL DECANOATE 100 MG: 100 INJECTION INTRAMUSCULAR at 15:09

## 2021-08-10 RX ADMIN — HALOPERIDOL DECANOATE 100 MG: 100 INJECTION INTRAMUSCULAR at 15:17

## 2021-08-16 ENCOUNTER — OFFICE VISIT (OUTPATIENT)
Dept: FAMILY MEDICINE CLINIC | Age: 56
End: 2021-08-16
Payer: MEDICARE

## 2021-08-16 VITALS
DIASTOLIC BLOOD PRESSURE: 73 MMHG | SYSTOLIC BLOOD PRESSURE: 112 MMHG | WEIGHT: 145.2 LBS | BODY MASS INDEX: 24.19 KG/M2 | HEIGHT: 65 IN | OXYGEN SATURATION: 96 % | HEART RATE: 73 BPM | TEMPERATURE: 97.7 F

## 2021-08-16 DIAGNOSIS — R25.1 TREMOR: ICD-10-CM

## 2021-08-16 DIAGNOSIS — R26.9 GAIT DISTURBANCE: ICD-10-CM

## 2021-08-16 DIAGNOSIS — E11.9 TYPE 2 DIABETES MELLITUS WITHOUT COMPLICATION, WITHOUT LONG-TERM CURRENT USE OF INSULIN (HCC): ICD-10-CM

## 2021-08-16 DIAGNOSIS — M79.89 LEG SWELLING: Primary | ICD-10-CM

## 2021-08-16 PROCEDURE — G8420 CALC BMI NORM PARAMETERS: HCPCS | Performed by: FAMILY MEDICINE

## 2021-08-16 PROCEDURE — 2022F DILAT RTA XM EVC RTNOPTHY: CPT | Performed by: FAMILY MEDICINE

## 2021-08-16 PROCEDURE — 3017F COLORECTAL CA SCREEN DOC REV: CPT | Performed by: FAMILY MEDICINE

## 2021-08-16 PROCEDURE — G8427 DOCREV CUR MEDS BY ELIG CLIN: HCPCS | Performed by: FAMILY MEDICINE

## 2021-08-16 PROCEDURE — G8510 SCR DEP NEG, NO PLAN REQD: HCPCS | Performed by: FAMILY MEDICINE

## 2021-08-16 PROCEDURE — 3044F HG A1C LEVEL LT 7.0%: CPT | Performed by: FAMILY MEDICINE

## 2021-08-16 PROCEDURE — 99213 OFFICE O/P EST LOW 20 MIN: CPT | Performed by: FAMILY MEDICINE

## 2021-08-16 NOTE — PATIENT INSTRUCTIONS
General Health and concerns:  HEART HEALTHY DIET:  A heart healthy diet is one that is low in cholesterol (less than 300 mg daily), fat (less than 80 g daily) . You should also minimize carbohydrates / sugars (less amounts of breads, pastas, potato and potato products and sugary foods/snacks, cookies, cakes, etc) . Try to eat whole wheat/multigrain breads and pastas and eat more vegetables. Cook with olive oil (or no oil) and grill, bake, broil or boil foods. Less red meat and more chicken , fish and lean cuts of beef (limited). 4923-4841 calories per day is sufficient [id number] is acceptable for weight loss. EXERCISE:  You should do exercise 3-5 days per week (minimum) to include increasing your heart rate for 30 to 45 minutes. At least a pace of a brisk walk should do that. This build up your heart and lung endurance and muscles and helps many function of the body. OTHER:    IF your condition(s) do not improve, get worse and/or if any concerns arise, please call or come by the office. Routine Health maintenance: You need to get a yearly follow up/physical exam to review, discuss age and gender appropriate exams, labs, vaccines and screening tests. This includes cardiovascular health risk, cancer screens and other clive related topics. Medications-Take all medications as directed. Please do not stop unless you talk to your doctor or health care provider first. Report any problems immediately. Referrals: if you have been given a referral, please call the office if you do not hear from provider in one week. You may make the appointment yourself. Please keep all appointments with specialists and ask them to send their notes, thoughts, recommendations to us , as your PCP. KEEP all upcoming appointments with our office UNLESS otherwise and specifically told not to.     CHECK your diagnosis/problem list for today and that orders and prescriptions are what we discussed as well as MAKE sure all information is accurate and has been discussed to your satisfaction. PLEASE make sure all your questions have been answered and feel free to call or come back should any concerns arise. Imaging/Labs:  Be sure to get these images in a timely manner. IF your test must be scheduled, let us know if you need help getting this done and if you do not hear from that provider in a week , call us or them. BE SURE to call the office if you do not hear regarding the results in one week after the test is performed Image or lab). It is our intention to inform you of the results ALWAYS, even if normal you should get a notification (Call, portal message). PLEASE wilfrido if you do not get the results. PLEASE follow all recommendations and call/come in /ask questions if you do not understand of if problems develop after or in between visits. Failure to comply with recommended health care advise could result in serious health consequences. Thank you for choosing our practice and please let us know how we can help you feel better and stay well!

## 2021-08-16 NOTE — PROGRESS NOTES
1. Have you been to the ER, urgent care clinic since your last visit? Hospitalized since your last visit? No    2. Have you seen or consulted any other health care providers outside of the [address] [place] [address] since your last visit? Include any pap smears or colon screening. Psych    Chief Complaint   Patient presents with    Swelling     c/o swelling in bilateral feet. Brother asking if it could be Vasculitis. He looked up causes for swelling. Pt is no longer having salt in diet.       Visit Vitals  /73 (BP 1 Location: Right upper arm, BP Patient Position: Sitting)   Pulse 73   Temp 97.7 °F (36.5 °C) (Temporal)   Ht 5' 5\" (1.651 m)   Wt 145 lb 3.2 oz (65.9 kg)   SpO2 96%   BMI 24.16 kg/m²

## 2021-08-16 NOTE — PROGRESS NOTES
IDENTIFYING INFORMATION:      Sara Christopher , 60 y.o., male  [address] [facility],  [address], [address]     Medical Record Number: [id number]          CHIEF COMPLAINT:     Chief Complaint   Patient presents with    Swelling     c/o swelling in bilateral feet. Brother asking if it could be Vasculitis. He looked up causes for swelling. Pt is no longer having salt in diet. HISTORY OF PRESENT ILLNESS:    Shirley Christopher is a 60 y.o. male  has a past medical history of Anxiety, Depression, Intellectual disability, Schizoaffective disorder, bipolar type ([address] and Schizophrenia ([facility] .  he comes in for plaint of \"vascular\". His brother is with him. He says they do swell sometimes mostly during the day. They do hurt sometimes. However, they also turn white. He has no injury per se. He is not very active. There is no numbness, burning, tingling. He does have history of diabetes. However, his hemoglobin A1c has been well under 5.8%. He has schizophrenia and does get catatonic sometimes. The haloperidol seems to keep him out of this state. He has no fevers, chills, sweats. PAST MEDICAL HISTORY:     Past Medical History:   Diagnosis Date    Anxiety     Depression     Intellectual disability     Schizoaffective disorder, bipolar type ([facility]     Schizophrenia ([facility]        MEDICATIONS:     Current Outpatient Medications on File Prior to Visit   Medication Sig Dispense Refill    lithium carbonate 300 mg tablet Take 1 Tablet by mouth two (2) times a day. 60 Tablet 1    risperiDONE (RisperDAL) 3 mg tablet Take 1 Tablet by mouth two (2) times a day. 60 Tablet 1    haloperidol decanoate (HaldoL Decanoate) 50 mg/mL injection 100 mg Once every 2 weeks. Was supposed to have a dose on June 9th at MD office but patient couldn't walk well so he came to ER.  7/13/21 - changed from every 2  Weeks to every 3 weeks.  benztropine (COGENTIN) 1 mg tablet Take 1 mg by mouth two (2) times a day.       buPROPion XL (WELLBUTRIN XL) 150 mg tablet Take 1 Tab by mouth daily.  temazepam (RESTORIL) 30 mg capsule Take 1 Cap by mouth nightly.  FLUoxetine (PROzac) 20 mg capsule Take 20 mg by mouth Before breakfast, lunch, and dinner. (Patient not taking: Reported on 7/13/2021)      haloperidol decanoate (HALDOL DECANOATE) 100 mg/mL injection 1 mL by IntraMUSCular route every twenty-eight (28) days.  (Patient not taking: Reported on 7/13/2021) 1 mL 0     Current Facility-Administered Medications on File Prior to Visit   Medication Dose Route Frequency Provider Last Rate Last Admin    haloperidol decanoate (HALDOL DECANOATE) 100 mg/mL LA injection 100 mg  100 mg IntraMUSCular Q28D [place] Bilis, NP   100 mg at 08/10/21 1517    haloperidol decanoate (HALDOL DECANOATE) 100 mg/mL LA injection 100 mg  100 mg IntraMUSCular Q28D Reji [place], DO   100 mg at 05/26/21 1533    haloperidol decanoate (HALDOL DECANOATE) 100 mg/mL LA injection 100 mg  100 mg IntraMUSCular Q28D [place] Bilis, NP   100 mg at 05/12/21 1453    haloperidol decanoate (HALDOL DECANOATE) 100 mg/mL LA injection 100 mg  100 mg IntraMUSCular Q28D Reji [place], DO   100 mg at 06/21/21 1500    haloperidol decanoate (HALDOL DECANOATE) 100 mg/mL LA injection 100 mg  100 mg IntraMUSCular Q28D Reji [place], DO   100 mg at 02/17/21 1636    haloperidol decanoate (HALDOL DECANOATE) 100 mg/mL LA injection 100 mg  100 mg IntraMUSCular Q28D Reji [place], DO        haloperidol decanoate (HALDOL DECANOATE) 100 mg/mL LA injection 100 mg  100 mg IntraMUSCular Q28D Reji [place], DO   100 mg at 05/12/21 1218    haloperidol decanoate (HALDOL DECANOATE) 100 mg/mL LA injection 100 mg  100 mg IntraMUSCular Q28D [place] Bilis, NP   100 mg at 01/06/21 1507    haloperidol decanoate (HALDOL DECANOATE) 100 mg/mL LA injection 100 mg  100 mg IntraMUSCular Q28D Reji [place], DO           ALLERGIES:    No Known Allergies      SOCIAL HISTORY:     Social History     Tobacco Use    Smoking status: Never Smoker    Smokeless tobacco: Never Used   Vaping Use    Vaping Use: Never used   Substance Use Topics    Alcohol use: Never    Drug use: Never       SURGICAL HISTORY:    History reviewed. No pertinent surgical history. FAMILY HISTORY:    Family History   Problem Relation Age of Onset    Cancer Father         bone    Pancreatic Cancer Father     Dementia Mother          REVIEW OF SYSTEMS:    I personally collected this information from all available source present (patient/others in room and records available) -[email or web address]    Review of Systems   Constitutional: Negative for chills, diaphoresis, fever and weight loss. HENT: Negative for congestion, nosebleeds, sinus pain and sore throat. Eyes: Negative for blurred vision and double vision. Respiratory: Negative for cough, sputum production and shortness of breath. Cardiovascular: Positive for leg swelling. Negative for chest pain, palpitations, orthopnea, claudication and PND. Gastrointestinal: Negative for abdominal pain, constipation, diarrhea, heartburn, nausea and vomiting. Genitourinary: Negative for dysuria, frequency and urgency. Musculoskeletal: Negative for back pain, joint pain, myalgias and neck pain. Skin: Negative for itching and rash. Neurological: Negative for dizziness, tingling, sensory change, focal weakness and headaches. Psychiatric/Behavioral: Negative for depression. The patient is not nervous/anxious and does not have insomnia.             PHYSICAL EXAMINATION:    Vital Signs:    Visit Vitals  /73 (BP 1 Location: Right upper arm, BP Patient Position: Sitting)   Pulse 73   Temp 97.7 °F (36.5 °C) (Temporal)   Ht 5' 5\" (1.651 m)   Wt 145 lb 3.2 oz (65.9 kg)   SpO2 96%   BMI 24.16 kg/m²         Wt Readings from Last 3 Encounters:   08/16/21 145 lb 3.2 oz (65.9 kg)   08/10/21 147 lb (66.7 kg)   07/27/21 147 lb (66.7 kg)     BP Readings from Last 3 Encounters:   08/16/21 112/73   08/10/21 109/75   07/27/21 114/75 Physical Exam  Constitutional:       Appearance: He is not ill-appearing or toxic-appearing. Eyes:      General: No scleral icterus. Extraocular Movements: Extraocular movements intact. Conjunctiva/sclera: Conjunctivae normal.   Cardiovascular:      Rate and Rhythm: Normal rate and regular rhythm. Pulses: Normal pulses. Heart sounds: No murmur heard. No friction rub. No gallop. Comments: Pulses are +2/4 bilaterally. Toes are warm and pink. Pulmonary:      Effort: Pulmonary effort is normal.      Breath sounds: Normal breath sounds. No wheezing, rhonchi or rales. Abdominal:      Palpations: Abdomen is soft. Tenderness: There is no abdominal tenderness. There is no guarding. Musculoskeletal:         General: No swelling, tenderness or deformity. Cervical back: No tenderness. Right lower leg: No edema. Left lower leg: No edema. Lymphadenopathy:      Cervical: No cervical adenopathy. Skin:     Capillary Refill: Capillary refill takes less than 2 seconds. Coloration: Skin is not jaundiced. Findings: No erythema or rash. Neurological:      General: No focal deficit present. Mental Status: He is alert and oriented to person, place, and time. Mental status is at baseline. Psychiatric:         Mood and Affect: Mood normal.         Behavior: Behavior normal.         Thought Content: Thought content normal.         Judgment: Judgment normal.           ASSESSMENT/PLAN:      Discussion (regarding today's visit with [facility]); · This leg swelling and pain is intermittent and is likely not related to DVT. · However, it is certainly could have a mild degree of venous insufficiency though it does not appear so on the external exam  · I do think we need to get some blood work there could be some a type of vasculitis  · Follow-up with these results and treat as indicated. ICD-10-CM ICD-9-CM    1.  Leg swelling  M79.89 729.81 SED RATE (ESR) RANDY BY MULTIPLEX FLOW IA, QL      METABOLIC PANEL, COMPREHENSIVE      CBC WITH AUTOMATED DIFF      DUPLEX LOWER EXT VENOUS BILAT   2. Type 2 diabetes mellitus without complication, without long-term current use of insulin (HCC)  E11.9 250.00    3. Gait disturbance  R26.9 781.2 SED RATE (ESR)      RANDY BY MULTIPLEX FLOW IA, QL      METABOLIC PANEL, COMPREHENSIVE      CBC WITH AUTOMATED DIFF      DUPLEX LOWER EXT VENOUS BILAT   4. Tremor  R25.1 781.0 SED RATE (ESR)      RANDY BY MULTIPLEX FLOW IA, QL      METABOLIC PANEL, COMPREHENSIVE      CBC WITH AUTOMATED DIFF      DUPLEX LOWER EXT VENOUS BILAT      WE reviewed each diagnosis listed for today's visit.  WE reviewed medications, treatment, testing such as labs, imagine, referrals and when to call regarding results and appointments.  Reminded patient to keep any and all appointments with specialists, labs, imaging.  Reminded patient to make sure we get copies of any specialists care, labs and imaging.  Reminded patient to call of come by the office if there are any concerns, questions , comments or problems.  The patient verbalized understanding of the care plan and all questions were answered to the patient's satisfaction prior to leaving the office.  The patient was told that failure to comply with recommended testing could result in abnormal health consequences.  The patient was instructed to have yearly routine health maintenance including but not limited to age appropriate vaccines, testing, screening exams.  ALL questions were answered to his satisfaction before leaving the office. The patient actively participated in medical decision making. FOLLOW UP:     Patient knows to keep any and all future visits scheduled unless told otherwise. Patient knows to call, come back if any concerns, questions, comments or problems arise. Follow-up and Dispositions    · Return if symptoms worsen or fail to improve.             Faith Donato Adenike Tiwari DO    This visit was reviewed and signed electronically. It was been completed with voice recognition software and hand typing. It may have syntax and spelling errors despite editing.

## 2021-08-17 LAB
ALBUMIN SERPL-MCNC: 4.1 G/DL (ref 3.8–4.9)
ALBUMIN/GLOB SERPL: 1.9 {RATIO} (ref 1.2–2.2)
ALP SERPL-CCNC: 67 IU/L (ref 48–121)
ALT SERPL-CCNC: 28 IU/L (ref 0–44)
AST SERPL-CCNC: 23 IU/L (ref 0–40)
BASOPHILS # BLD AUTO: 0 X10E3/UL (ref 0–0.2)
BASOPHILS NFR BLD AUTO: 1 %
BILIRUB SERPL-MCNC: 0.4 MG/DL (ref 0–1.2)
BUN SERPL-MCNC: 13 MG/DL (ref 6–24)
BUN/CREAT SERPL: 12 (ref 9–20)
CALCIUM SERPL-MCNC: 8.8 MG/DL (ref 8.7–10.2)
CHLORIDE SERPL-SCNC: 104 MMOL/L (ref 96–106)
CO2 SERPL-SCNC: 23 MMOL/L (ref 20–29)
CREAT SERPL-MCNC: 1.07 MG/DL (ref 0.76–1.27)
EOSINOPHIL # BLD AUTO: 0.2 X10E3/UL (ref 0–0.4)
EOSINOPHIL NFR BLD AUTO: 3 %
ERYTHROCYTE [DISTWIDTH] IN BLOOD BY AUTOMATED COUNT: 12.3 % (ref 11.6–15.4)
GLOBULIN SER CALC-MCNC: 2.2 G/DL (ref 1.5–4.5)
GLUCOSE SERPL-MCNC: 129 MG/DL (ref 65–99)
HCT VFR BLD AUTO: 41.8 % (ref 37.5–51)
HGB BLD-MCNC: 13.4 G/DL (ref 13–17.7)
IMM GRANULOCYTES # BLD AUTO: 0 X10E3/UL (ref 0–0.1)
IMM GRANULOCYTES NFR BLD AUTO: 1 %
LYMPHOCYTES # BLD AUTO: 1.2 X10E3/UL (ref 0.7–3.1)
LYMPHOCYTES NFR BLD AUTO: 19 %
MCH RBC QN AUTO: 29.3 PG (ref 26.6–33)
MCHC RBC AUTO-ENTMCNC: 32.1 G/DL (ref 31.5–35.7)
MCV RBC AUTO: 91 FL (ref 79–97)
MONOCYTES # BLD AUTO: 0.6 X10E3/UL (ref 0.1–0.9)
MONOCYTES NFR BLD AUTO: 9 %
NEUTROPHILS # BLD AUTO: 4.3 X10E3/UL (ref 1.4–7)
NEUTROPHILS NFR BLD AUTO: 67 %
PLATELET # BLD AUTO: 203 X10E3/UL (ref 150–450)
POTASSIUM SERPL-SCNC: 3.9 MMOL/L (ref 3.5–5.2)
PROT SERPL-MCNC: 6.3 G/DL (ref 6–8.5)
RBC # BLD AUTO: 4.58 X10E6/UL (ref 4.14–5.8)
SODIUM SERPL-SCNC: 140 MMOL/L (ref 134–144)
WBC # BLD AUTO: 6.3 X10E3/UL (ref 3.4–10.8)

## 2021-08-23 ENCOUNTER — HOSPITAL ENCOUNTER (OUTPATIENT)
Dept: NON INVASIVE DIAGNOSTICS | Age: 56
Discharge: HOME | End: 2021-08-23
Attending: FAMILY MEDICINE
Payer: MEDICARE

## 2021-08-23 DIAGNOSIS — R25.1 TREMOR: ICD-10-CM

## 2021-08-23 DIAGNOSIS — R26.9 GAIT DISTURBANCE: ICD-10-CM

## 2021-08-23 DIAGNOSIS — M79.89 LEG SWELLING: ICD-10-CM

## 2021-08-23 PROCEDURE — 93970 EXTREMITY STUDY: CPT

## 2021-08-24 ENCOUNTER — CLINICAL SUPPORT (OUTPATIENT)
Dept: FAMILY MEDICINE CLINIC | Age: 56
End: 2021-08-24
Payer: MEDICARE

## 2021-08-24 VITALS
BODY MASS INDEX: 24.32 KG/M2 | HEART RATE: 85 BPM | TEMPERATURE: 98.2 F | WEIGHT: 146 LBS | HEIGHT: 65 IN | SYSTOLIC BLOOD PRESSURE: 125 MMHG | DIASTOLIC BLOOD PRESSURE: 73 MMHG | OXYGEN SATURATION: 95 %

## 2021-08-24 DIAGNOSIS — F20.9 SCHIZOPHRENIA, UNSPECIFIED TYPE (HCC): Primary | ICD-10-CM

## 2021-08-24 PROCEDURE — 93970 EXTREMITY STUDY: CPT | Performed by: SURGERY

## 2021-08-24 PROCEDURE — 96372 THER/PROPH/DIAG INJ SC/IM: CPT | Performed by: FAMILY MEDICINE

## 2021-08-24 PROCEDURE — 90000 NO LOS: CPT | Performed by: FAMILY MEDICINE

## 2021-08-24 RX ORDER — HALOPERIDOL DECANOATE 100 MG/ML
100 INJECTION INTRAMUSCULAR ONCE
Status: COMPLETED | OUTPATIENT
Start: 2021-08-24 | End: 2021-08-24

## 2021-08-24 RX ADMIN — HALOPERIDOL DECANOATE 100 MG: 100 INJECTION INTRAMUSCULAR at 15:07

## 2021-09-04 ENCOUNTER — HOSPITAL ENCOUNTER (INPATIENT)
Age: 56
LOS: 5 days | Discharge: HOME | DRG: 885 | End: 2021-09-10
Attending: EMERGENCY MEDICINE | Admitting: PSYCHIATRY & NEUROLOGY
Payer: MEDICARE

## 2021-09-04 DIAGNOSIS — F25.9 SCHIZOAFFECTIVE DISORDER, UNSPECIFIED TYPE (HCC): Primary | ICD-10-CM

## 2021-09-04 DIAGNOSIS — F20.9 SCHIZOPHRENIA, UNSPECIFIED TYPE (HCC): ICD-10-CM

## 2021-09-04 PROCEDURE — 99283 EMERGENCY DEPT VISIT LOW MDM: CPT

## 2021-09-05 PROBLEM — F32.A DEPRESSION WITH SUICIDAL IDEATION: Status: ACTIVE | Noted: 2021-09-05

## 2021-09-05 PROBLEM — R45.851 DEPRESSION WITH SUICIDAL IDEATION: Status: ACTIVE | Noted: 2021-09-05

## 2021-09-05 LAB
ALBUMIN SERPL-MCNC: 3.6 G/DL (ref 3.5–5)
ALBUMIN/GLOB SERPL: 1.2 {RATIO} (ref 1.1–2.2)
ALP SERPL-CCNC: 61 U/L (ref 45–117)
ALT SERPL-CCNC: 28 U/L (ref 12–78)
AMPHET UR QL SCN: NEGATIVE
ANION GAP SERPL CALC-SCNC: 5 MMOL/L (ref 5–15)
AST SERPL W P-5'-P-CCNC: 15 U/L (ref 15–37)
BARBITURATES UR QL SCN: NEGATIVE
BASOPHILS # BLD: 0 K/UL (ref 0–0.1)
BASOPHILS NFR BLD: 1 % (ref 0–1)
BENZODIAZ UR QL: NEGATIVE
BILIRUB SERPL-MCNC: 0.4 MG/DL (ref 0.2–1)
BUN SERPL-MCNC: 20 MG/DL (ref 6–20)
BUN/CREAT SERPL: 20 (ref 12–20)
CA-I BLD-MCNC: 8.6 MG/DL (ref 8.5–10.1)
CANNABINOIDS UR QL SCN: NEGATIVE
CHLORIDE SERPL-SCNC: 106 MMOL/L (ref 97–108)
CO2 SERPL-SCNC: 26 MMOL/L (ref 21–32)
COCAINE UR QL SCN: NEGATIVE
CREAT SERPL-MCNC: 1 MG/DL (ref 0.7–1.3)
DIFFERENTIAL METHOD BLD: ABNORMAL
DRUG SCRN COMMENT,DRGCM: NORMAL
EOSINOPHIL # BLD: 0.2 K/UL (ref 0–0.4)
EOSINOPHIL NFR BLD: 4 % (ref 0–7)
ERYTHROCYTE [DISTWIDTH] IN BLOOD BY AUTOMATED COUNT: 12 % (ref 11.5–14.5)
ETHANOL SERPL-MCNC: <4 MG/DL
GLOBULIN SER CALC-MCNC: 3 G/DL (ref 2–4)
GLUCOSE SERPL-MCNC: 107 MG/DL (ref 65–100)
HCT VFR BLD AUTO: 39.6 % (ref 36.6–50.3)
HGB BLD-MCNC: 13.1 G/DL (ref 12.1–17)
IMM GRANULOCYTES # BLD AUTO: 0.1 K/UL (ref 0–0.04)
IMM GRANULOCYTES NFR BLD AUTO: 1 % (ref 0–0.5)
LYMPHOCYTES # BLD: 1.4 K/UL (ref 0.8–3.5)
LYMPHOCYTES NFR BLD: 24 % (ref 12–49)
MAGNESIUM SERPL-MCNC: 1.9 MG/DL (ref 1.6–2.4)
MCH RBC QN AUTO: 29.9 PG (ref 26–34)
MCHC RBC AUTO-ENTMCNC: 33.1 G/DL (ref 30–36.5)
MCV RBC AUTO: 90.4 FL (ref 80–99)
METHADONE UR QL: NEGATIVE
MONOCYTES # BLD: 0.5 K/UL (ref 0–1)
MONOCYTES NFR BLD: 9 % (ref 5–13)
NEUTS SEG # BLD: 3.6 K/UL (ref 1.8–8)
NEUTS SEG NFR BLD: 61 % (ref 32–75)
NRBC # BLD: 0 K/UL (ref 0–0.01)
NRBC BLD-RTO: 0 PER 100 WBC
OPIATES UR QL: NEGATIVE
PCP UR QL: NEGATIVE
PLATELET # BLD AUTO: 184 K/UL (ref 150–400)
PMV BLD AUTO: 10.6 FL (ref 8.9–12.9)
POTASSIUM SERPL-SCNC: 3.4 MMOL/L (ref 3.5–5.1)
PROT SERPL-MCNC: 6.6 G/DL (ref 6.4–8.2)
RBC # BLD AUTO: 4.38 M/UL (ref 4.1–5.7)
SARS-COV-2, COV2: NOT DETECTED
SODIUM SERPL-SCNC: 137 MMOL/L (ref 136–145)
WBC # BLD AUTO: 5.8 K/UL (ref 4.1–11.1)

## 2021-09-05 PROCEDURE — 80307 DRUG TEST PRSMV CHEM ANLYZR: CPT

## 2021-09-05 PROCEDURE — 82077 ASSAY SPEC XCP UR&BREATH IA: CPT

## 2021-09-05 PROCEDURE — 74011250637 HC RX REV CODE- 250/637: Performed by: PSYCHIATRY & NEUROLOGY

## 2021-09-05 PROCEDURE — 87635 SARS-COV-2 COVID-19 AMP PRB: CPT

## 2021-09-05 PROCEDURE — 83735 ASSAY OF MAGNESIUM: CPT

## 2021-09-05 PROCEDURE — 36415 COLL VENOUS BLD VENIPUNCTURE: CPT

## 2021-09-05 PROCEDURE — 85025 COMPLETE CBC W/AUTO DIFF WBC: CPT

## 2021-09-05 PROCEDURE — 80053 COMPREHEN METABOLIC PANEL: CPT

## 2021-09-05 PROCEDURE — 65220000003 HC RM SEMIPRIVATE PSYCH

## 2021-09-05 RX ORDER — BUPROPION HYDROCHLORIDE 150 MG/1
150 TABLET ORAL
Status: DISCONTINUED | OUTPATIENT
Start: 2021-09-05 | End: 2021-09-10 | Stop reason: HOSPADM

## 2021-09-05 RX ORDER — HALOPERIDOL DECANOATE 50 MG/ML
100 INJECTION INTRAMUSCULAR ONCE
Status: COMPLETED | OUTPATIENT
Start: 2021-09-07 | End: 2021-09-07

## 2021-09-05 RX ORDER — FLUOXETINE HYDROCHLORIDE 20 MG/1
20 CAPSULE ORAL
Status: DISCONTINUED | OUTPATIENT
Start: 2021-09-05 | End: 2021-09-10 | Stop reason: HOSPADM

## 2021-09-05 RX ORDER — TRAZODONE HYDROCHLORIDE 50 MG/1
50 TABLET ORAL
Status: DISCONTINUED | OUTPATIENT
Start: 2021-09-05 | End: 2021-09-10 | Stop reason: HOSPADM

## 2021-09-05 RX ORDER — LITHIUM CARBONATE 300 MG
300 TABLET ORAL EVERY MORNING
Status: DISCONTINUED | OUTPATIENT
Start: 2021-09-05 | End: 2021-09-10 | Stop reason: HOSPADM

## 2021-09-05 RX ORDER — LITHIUM CARBONATE 300 MG
600 TABLET ORAL
Status: DISCONTINUED | OUTPATIENT
Start: 2021-09-05 | End: 2021-09-10 | Stop reason: HOSPADM

## 2021-09-05 RX ORDER — ADHESIVE BANDAGE
30 BANDAGE TOPICAL DAILY PRN
Status: DISCONTINUED | OUTPATIENT
Start: 2021-09-05 | End: 2021-09-10 | Stop reason: HOSPADM

## 2021-09-05 RX ORDER — HYDROXYZINE 50 MG/1
50 TABLET, FILM COATED ORAL
Status: DISCONTINUED | OUTPATIENT
Start: 2021-09-05 | End: 2021-09-10 | Stop reason: HOSPADM

## 2021-09-05 RX ORDER — HALOPERIDOL DECANOATE 100 MG/ML
100 INJECTION INTRAMUSCULAR EVERY 2 WEEKS
COMMUNITY
End: 2021-09-10

## 2021-09-05 RX ORDER — FLUOXETINE HYDROCHLORIDE 20 MG/1
20 CAPSULE ORAL
COMMUNITY
End: 2021-09-10

## 2021-09-05 RX ORDER — FLUOXETINE HYDROCHLORIDE 20 MG/1
40 CAPSULE ORAL DAILY
Status: DISCONTINUED | OUTPATIENT
Start: 2021-09-05 | End: 2021-09-10 | Stop reason: HOSPADM

## 2021-09-05 RX ORDER — BENZTROPINE MESYLATE 1 MG/1
1 TABLET ORAL 2 TIMES DAILY
Status: DISCONTINUED | OUTPATIENT
Start: 2021-09-05 | End: 2021-09-10 | Stop reason: HOSPADM

## 2021-09-05 RX ORDER — ACETAMINOPHEN 325 MG/1
650 TABLET ORAL
Status: DISCONTINUED | OUTPATIENT
Start: 2021-09-05 | End: 2021-09-10 | Stop reason: HOSPADM

## 2021-09-05 RX ADMIN — BENZTROPINE MESYLATE 1 MG: 1 TABLET ORAL at 09:28

## 2021-09-05 RX ADMIN — RISPERIDONE 3 MG: 2 TABLET ORAL at 20:47

## 2021-09-05 RX ADMIN — LITHIUM CARBONATE 300 MG: 300 TABLET ORAL at 09:27

## 2021-09-05 RX ADMIN — BENZTROPINE MESYLATE 1 MG: 1 TABLET ORAL at 20:47

## 2021-09-05 RX ADMIN — LITHIUM CARBONATE 600 MG: 300 TABLET ORAL at 20:47

## 2021-09-05 RX ADMIN — FLUOXETINE 40 MG: 20 CAPSULE ORAL at 09:32

## 2021-09-05 RX ADMIN — BENZTROPINE MESYLATE 1 MG: 1 TABLET ORAL at 09:27

## 2021-09-05 RX ADMIN — FLUOXETINE 20 MG: 20 CAPSULE ORAL at 20:47

## 2021-09-05 RX ADMIN — BUPROPION HYDROCHLORIDE 150 MG: 150 TABLET, FILM COATED, EXTENDED RELEASE ORAL at 20:47

## 2021-09-05 RX ADMIN — RISPERIDONE 3 MG: 2 TABLET ORAL at 09:28

## 2021-09-05 NOTE — ED TRIAGE NOTES
Pt brought in brother reports recently admitted here states changes to lithium. Reports today became agitated banging head against objects.  Pt reports SI

## 2021-09-05 NOTE — BH NOTES
PSYCHOSOCIAL ASSESSMENT  :Patient identifying info: Jevon Christopher is a 60 y.o., male admitted 9/4/2021 11:25 PM     Presenting problem and precipitating factors:   Pt reports that he came to get his meds adjusted. Pt also endorses depression, reports depressed about 2-3 months. Pt denies any SI.HI/AVH at this time or at time of admission. Mental status assessment:   Pt presents seated in plain clothes, well groomed, soft spoken, strong accent but able to understand writer, polite and engaged. Pt presents with bright affect and congruent mood, linear thought process, focused thought content on getting his medications right. 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 Collateral information:   Pt signed KOURTNEY for his brother, Dustin Cunningham, at [phone]. Current psychiatric /substance abuse providers and contact info:   Pt reports that he sees a Dr. Vinicius Park (sp?) for medications, reports that he does not know how to spell name. Previous psychiatric/substance abuse providers and response to treatment:   Pt reports that he has been hospitalized one time in the past in June at [facility]. Family history of mental illness or substance abuse:   Pt denies. Substance abuse history:    Social History     Tobacco Use    Smoking status: Never Smoker    Smokeless tobacco: Never Used   Substance Use Topics    Alcohol use: Never   Pt denies. History of biomedical complications associated with substance abuse :  Pt denies. Patient's current acceptance of treatment or motivation for change:  Pt rates motivation at 5/10, when asked why he is at a 5, pt was not able to say. Family constellation:   Pt reports his family is his brother and his cousin. Is significant other involved? Pt denies. Describe support system:   Pt reports his brother is his main support system. Describe living arrangements and home environment:  Pt reports that he lives with his brother. Guns:  Pt denies.     Health issues:   Hospital Problems  Date Reviewed: 2021        Codes Class Noted POA    Depression with suicidal ideation ICD-10-CM: F32.9, R45.851  ICD-9-CM: 767, V62.84  2021 Unknown              Trauma history:   Pt denies. Legal issues:   Pt denies. History of  service:   Pt denies. Financial status:   Pt reports he gets SSI. Gnosticism/cultural factors:   Pt denies, declines derrick involvement. Education/work history:   Pt reports that he went to college and got a bachelors in electrical engineering, reports he is currently unemployed. Have you been licensed as a health care professional (current or ):   Pt denies. Leisure and recreation preferences:   Pt reports he likes playing tennis and basketball. Describe coping skills:  Pt reports main coping skill is drinking cold water.     Miguel Stager  2021

## 2021-09-05 NOTE — BH NOTES
Patient is admitted to [facility] from our ER. Pt has a history of schizoaffective disorder with bipolar due to depression and anxiety since 1995. Pt denies having any suicidal thoughts at this time. He also says that he has had voices/hallucinations in the past but has not had any lately. He states he has been feeling more depressed than he previously felt with more lithium. He is med compliant, judgement is decreased and insight is poor. He states he gets about 11-12 hours of sleep per night. Medications were verified with brother and patient and written out for nurse to request from doctor orders. Pt's affect is smiling, mood states he is sad. He rates his depression is 5/10, and anxiety 5/10. He denies any thoughts of harming others and no SI anymore since coming onto unit. Pt has no medical history per patient, other than a hand surgery.

## 2021-09-05 NOTE — BSMART NOTE
INTAKE ASSESSMENT        A face to face assessment was conducted in ED Room 24. Pt arrived to the ED via brother Vidhi Hernandez Candi). Pt presents with a chief complaint of \"in need of medication adjustment\". Brother also confirms some level of visual hallucinations, where patient sees and talks about \"picking mother up\" and patient's mother is . Pt and brother report a recent medical hospitalization at [facility] the week prior and patient's meds were adjusted from (3) 300mg to (2) 300 mg. Since the adjustment, pt has deteriorated to also include \"banging his head on bedroom draws\". Pt is a 61 y/o male, alert and oriented x4 and provided name/ upon room entry. Pt presented in a hospital green gown and as stated age. Pt engaged in assessment, attitude calm, cooperative, and open. Good eye contact, speech of normal rate, tone, volume, prosody. Mood sad, affect congruent with mood, thought process clear and linear, thought content pre-occpied with need to \"fix meds\". Insight and judgement fair, cognition appropriate for age, attention, concentration. Pt denies SI, HI and A/VH, however patient's brother states, pt makes statements of seeing and wanting to  his mother who is . Pt unable to rate levels of depression and anxiety. Both patient and brother report, patient's primary language is english, however as a back up to staff wearing mask, patient is better able to understand \"Cyp[place]\". Sleep Pattern: Patient reports an average of 12 hours of sleep per day. Appetite Disturbance: Patient reports an average of 2 meals per day. Previous Psychiatric Hx: Pt reports a hx of schizophrenia for 30 years. Most Recent Hospitalizations if any: Pt reports a MH/BH hospitalization 6-7 years ago at [address]  or Therapist in the community: Pt is reportedly connected to Dr. Narcisa Sanders. Family Hx of Mental Illness: None reported.      Legal Issues: No probation/parole or charges pending. Hx of aggression/violence: Pt denies. Medical Hx: Pt reports no medical history. Allergies to Food or Medication: None reported. Hx. of Substance Use/Treatment: Pt denies substance use. Pt UDS positive for: Pt. UDS negative for all substances. Access to Weapons: Pt reports denies access to weapons. If weapons, Have they been removed: Confirmed by brother. Trauma Hx:      Sexual: No             When:  N/A     By Whom:  N/A     Physical: No          When: N/A          By Whom: N/A     Verbal: No             When: N/A          By Whom:  N/A     Emotional: N/A     Family Support: Yes     Who: Brother/[facility] @ [phone]     Housing: Pt resides with brother. Medications: Reported meds are Prozac 20 mg, Risperdal 3 mg, Lithium 300 mg, Bentropine 1 mg, Bupropion 150 mg, and haldol 100 mg    Safety Plan Completed: N/A        DISPOSITION: Writer consulted with Dr. Carmen Ansari who recommends Inpatient Level of Care. Pt admitted under the dx of bipolar. Pt is voluntary at this time. Writer explained the difference between a voluntary admission versus a potential TDO should pt choose to retract his decision. Pt confirmed an understanding of the process. Writer advised pt, ED Provider Dr. Holden, assigned Nurse Monik Murphy and [address] of the same. Contact extensions exchanged. Pt to be medically cleared (meeting [facility] requirements), in addition to the clearance of a negative COVID-19 PCR result. Patient assigned to Room 237/1 on [address] [place] [address]. Report to be called to [phone]. Writer will follow up as needed.

## 2021-09-05 NOTE — BH NOTES
PSA PART II ADDITIONAL INFORMATION        Access To Fire Arms: No    Substance Use: NO    Last Use: n/a    Type of Substance: no substance use    Frequency of Use: n/a    Request to See : NO    If yes, notified : NO    Release of Information Signed: YES    Release of Information Signed For: Pt signed KOURTNEY for his brother, Doris Wall, at [phone].

## 2021-09-05 NOTE — ROUTINE PROCESS
TRANSFER - OUT REPORT:    Verbal report given to [facility] (name) on [facility] T Candi  being transferred to [address] [place] [address] (unit) for routine progression of care       Report consisted of patients Situation, Background, Assessment and   Recommendations(SBAR). Information from the following report(s) SBAR, ED Summary, MAR, Recent Results and Med Rec Status was reviewed with the receiving nurse. Lines:       Opportunity for questions and clarification was provided.       Patient transported with:   [employer]

## 2021-09-05 NOTE — BH NOTES
Patient up for breakfast in the dining room, social with peers and staff. Patient was medication compliant. He rated depression at a 3 on a sale of 0-10. He denied SI, HI, AH, VH, and anxiety. Patient remains on close observation, Q 15 minute checks.

## 2021-09-05 NOTE — CONSULTS
Consult Date: 9/5/2021    Chief Complaint: No complaints  Chief Complaint   Patient presents with    Mental Health Problem       HPI: HPI patient is a 60years old patient here for psychiatry evaluation history of cough fever or chills no history of nausea vomiting diarrhea abdominal pain or no history of chest pain or shortness of breath increased frequency of micturition or painful micturition no history of headache or dizziness no signs of consciousness or seizures no history of change in appetite or change in weight  ROS:ROS   Constitutional: Negative  HEENT: Negative  CVS: Negative  RS: Negative  GI: Negative  : Negative  Musculoskeletal: Negative  Immunology: Records are not available  Neurology: Negative  Endocrine: Negative  Haem-Onc: Negative  Skin: Patient looks pale  Psychiatry: Depression  Allergies  No Known Allergies  FAMILY HISTORY:  Family History   Problem Relation Age of Onset    Cancer Father         bone    Pancreatic Cancer Father     Dementia Mother    Father has also history of high blood pressure  SOCIAL HISTORY:  Social History     Socioeconomic History    Marital status: SINGLE     Spouse name: Not on file    Number of children: Not on file    Years of education: Not on file    Highest education level: Not on file   Occupational History    Not on file   Tobacco Use    Smoking status: Never Smoker    Smokeless tobacco: Never Used   Vaping Use    Vaping Use: Never used   Substance and Sexual Activity    Alcohol use: Never    Drug use: Never    Sexual activity: Not on file   Other Topics Concern    Not on file   Social History Narrative    ** Merged History Encounter **         Lives at home with father, needs supervised care.      Social Determinants of Health     Financial Resource Strain:     Difficulty of Paying Living Expenses:    Food Insecurity:     Worried About Running Out of Food in the Last Year:     [address] in the Last Year:    Transportation Needs:  Lack of Transportation (Medical):  Lack of Transportation (Non-Medical):    Physical Activity:     Days of Exercise per Week:     Minutes of Exercise per Session:    Stress:     Feeling of Stress :    Social Connections:     Frequency of Communication with Friends and Family:     Frequency of Social Gatherings with Friends and Family:     Attends [employer] Services:     Active Member of Clubs or Organizations:     Attends Club or Organization Meetings:     Marital Status:    Intimate Partner Violence:     Fear of Current or Ex-Partner:     Emotionally Abused:     Physically Abused:     Sexually Abused:      Patient used to smoke half pack per day and he has not been smoking since last 1    SURGICAL HISTORY:  History reviewed. No pertinent surgical history. Patient had a left hand surgery  PMH:  Past Medical History:   Diagnosis Date    Anxiety     Depression     Intellectual disability     Schizoaffective disorder, [facility] type ([facility] [address]     Schizophrenia ([facility] [address]      Home Medications   Prior to Admission medications    Medication Sig Start Date End Date Taking? Authorizing Provider   haloperidol decanoate (HALDOL DECANOATE) 100 mg/mL injection 100 mg by IntraMUSCular route Once every 2 weeks. Next due Sept. 7th   Yes Provider, Historical   FLUoxetine (PROzac) 20 mg capsule Take 20 mg by mouth nightly. Yes Provider, Historical   lithium carbonate 300 mg tablet Take 1 Tablet by mouth two (2) times a day. 6/15/21  Yes Christen Hickman MD   risperiDONE (RisperDAL) 3 mg tablet Take 1 Tablet by mouth two (2) times a day. 6/15/21  Yes Christen Hickman MD   FLUoxetine (PROzac) 20 mg capsule Take 40 mg by mouth daily. Yes Provider, Historical   benztropine (COGENTIN) 1 mg tablet Take 1 mg by mouth two (2) times a day. 5/21/20  Yes Ale Cadet MD   buPROPion XL (WELLBUTRIN XL) 150 mg tablet Take 1 Tab by mouth daily.  5/27/20  Yes Provider, Historical   temazepam (RESTORIL) 30 mg capsule Take 1 Cap by mouth nightly. 7/24/20  Yes Tc Beach MD     Vitals:  Patient Vitals for the past 24 hrs:   Temp Pulse Resp BP SpO2   09/05/21 0825 97.8 °F (36.6 °C) (!) 59 16 110/74 --   09/05/21 0041 -- -- -- -- 99 %   09/04/21 2324 98.6 °F (37 °C) 67 18 132/82 99 %        General Examination: Physical Exam patient is a 60years old fairly built fairly nourished not in any acute distress alert awake oriented x3  HEENT: Normocephalic atraumatic skull pupils are bilaterally equal reactive to light clear nonicteric    Pain no edema of the eyelids no yellow nasal discharge tongue is pink no ulcer positive gag reflex no pharyngeal inflammation extraocular movements are intact  Neck: Supple full range of motion no JVD no HJR no lymphadenopathy no thyromegaly no carotid bruit  Chest: Expands well no localized swelling or tenderness  RS: Clear breath sounds no rhonchi no rales  CVS: S1-S2 audible regular no murmur gallop or pericardial rub  Abdomen: Soft bowel sounds are positive no organomegaly no tenderness  Extremeties: No edema no clubbing no cyanosis peripheral pulses 2+  CNS: Grossly unremarkable cranial nerves I to XII are individually checked and they are intact  Muscle power 5/5  Reflexes 2+  Plantars downgoing no sensory abnormality or deficit  Finger-to-nose test is negative  Romberg sign is negative            LABS: Potassium is 3.4    CXR Results  (Last 48 hours)    None          No results found for this or any previous visit (from the past 12 hour(s)). No orders to display        ASSESSMENT/PLAN: Hypokalemia  Underweight  Depression  High risk medication-lithium  Please make sure patient is maintaining well no NSAIDs is limited.   He continues  We will check his potassium tomorrow morning advised him to eat 3 meals a day and exercise patient is medically stable for psychiatry evaluation and treatment        4:44 PM, 09/05/21  Erich Traylor MD

## 2021-09-05 NOTE — PROGRESS NOTES
Problem: Depressed Mood (Adult/Pediatric)  Goal: *STG: Remains safe in hospital  Outcome: Progressing Towards Goal     Problem: Suicide  Goal: *STG: Remains safe in hospital  Outcome: Progressing Towards Goal  Goal: *STG: Seeks staff when feelings of self harm or harm towards others arise  Outcome: Progressing Towards Goal  Goal: *STG/LTG: No longer expresses self destructive or suicidal thoughts  Outcome: Progressing Towards Goal

## 2021-09-05 NOTE — ED NOTES
Pt's brother brought by a bag of pt's clothing.  Pt's belongings placed in pt belonging bag with pt's labels and stored at [address]

## 2021-09-05 NOTE — GROUP NOTE
IP  GROUP DOCUMENTATION INDIVIDUAL                                                                          Group Therapy Note    Date: 9/5/2021    Group Start Time: 1000  Group End Time: 5641  Group Topic: Nursing    [facility] 2 BEHA TH ACUTE    Jessica Terry RN     [facility]    Group Therapy Note    Attendees: 7         Attendance: Did not attend    Patient's Goal:      Interventions/techniques: Follows Directions:     Interactions:    Mental Status:     Behavior/appearance:     Goals Achieved: Additional Notes:  Patient was encouraged to come to group but did not attend.     Jozef Myrick RN

## 2021-09-05 NOTE — ED NOTES
Fabiola An LPN  with Kostas Schaefer RN  have completed a preliminary search of belonging in the presence of [facility]. Items that have been sent to security for safety reasons:  N/A  Personal belongings list:  Please Document in Narrator Under - Valuables    Patient placed in a green gown, personal belongings placed in belongings bag, patient label placed on the outside of the bag, and placed in a bin at nurse's station 3. Psych safe room setup completed. Plan of care communicated with [facility].

## 2021-09-06 LAB
ALBUMIN SERPL-MCNC: 3.4 G/DL (ref 3.5–5)
ALBUMIN/GLOB SERPL: 1.1 {RATIO} (ref 1.1–2.2)
ALP SERPL-CCNC: 62 U/L (ref 45–117)
ALT SERPL-CCNC: 27 U/L (ref 12–78)
ANION GAP SERPL CALC-SCNC: 6 MMOL/L (ref 5–15)
AST SERPL W P-5'-P-CCNC: 11 U/L (ref 15–37)
BILIRUB SERPL-MCNC: 0.5 MG/DL (ref 0.2–1)
BUN SERPL-MCNC: 14 MG/DL (ref 6–20)
BUN/CREAT SERPL: 15 (ref 12–20)
CA-I BLD-MCNC: 8.7 MG/DL (ref 8.5–10.1)
CHLORIDE SERPL-SCNC: 110 MMOL/L (ref 97–108)
CO2 SERPL-SCNC: 27 MMOL/L (ref 21–32)
CREAT SERPL-MCNC: 0.93 MG/DL (ref 0.7–1.3)
GLOBULIN SER CALC-MCNC: 3.2 G/DL (ref 2–4)
GLUCOSE SERPL-MCNC: 90 MG/DL (ref 65–100)
POTASSIUM SERPL-SCNC: 3.6 MMOL/L (ref 3.5–5.1)
PROT SERPL-MCNC: 6.6 G/DL (ref 6.4–8.2)
SODIUM SERPL-SCNC: 143 MMOL/L (ref 136–145)

## 2021-09-06 PROCEDURE — 65220000003 HC RM SEMIPRIVATE PSYCH

## 2021-09-06 PROCEDURE — 80053 COMPREHEN METABOLIC PANEL: CPT

## 2021-09-06 PROCEDURE — 36415 COLL VENOUS BLD VENIPUNCTURE: CPT

## 2021-09-06 PROCEDURE — 74011250637 HC RX REV CODE- 250/637: Performed by: PSYCHIATRY & NEUROLOGY

## 2021-09-06 RX ADMIN — LITHIUM CARBONATE 300 MG: 300 TABLET ORAL at 08:28

## 2021-09-06 RX ADMIN — LITHIUM CARBONATE 600 MG: 300 TABLET ORAL at 21:12

## 2021-09-06 RX ADMIN — BENZTROPINE MESYLATE 1 MG: 1 TABLET ORAL at 21:13

## 2021-09-06 RX ADMIN — RISPERIDONE 3 MG: 2 TABLET ORAL at 21:13

## 2021-09-06 RX ADMIN — RISPERIDONE 3 MG: 2 TABLET ORAL at 08:28

## 2021-09-06 RX ADMIN — BENZTROPINE MESYLATE 1 MG: 1 TABLET ORAL at 08:28

## 2021-09-06 RX ADMIN — FLUOXETINE 40 MG: 20 CAPSULE ORAL at 08:28

## 2021-09-06 RX ADMIN — BUPROPION HYDROCHLORIDE 150 MG: 150 TABLET, FILM COATED, EXTENDED RELEASE ORAL at 21:13

## 2021-09-06 RX ADMIN — FLUOXETINE 20 MG: 20 CAPSULE ORAL at 21:12

## 2021-09-06 NOTE — BH NOTES
Patient is quiet, and isolative, but very pleasant and smiling. States he did not attend any groups yesterday and nurse encouraged him to do so that he can get better to cope with his illness and go home sooner. Pt rated his depression a 5/10 and his anxiety a 5/10. He currently denies SI, HI and hallucinations. Pt slept well tonight, was med compliant, no distress noted. Will continue to monitor patient closely every 15 mins as pr unit protocol.

## 2021-09-06 NOTE — PROGRESS NOTES
Problem: Depressed Mood (Adult/Pediatric)  Goal: *STG: Attends activities and groups  Outcome: Progressing Towards Goal  Goal: *STG: Remains safe in hospital  Outcome: Progressing Towards Goal  Goal: *STG: Complies with medication therapy  Outcome: Progressing Towards Goal     Problem: Suicide  Goal: *STG: Remains safe in hospital  Outcome: Progressing Towards Goal  Goal: *STG: Seeks staff when feelings of self harm or harm towards others arise  Outcome: Progressing Towards Goal  Goal: *STG/LTG: Complies with medication therapy  Outcome: Progressing Towards Goal

## 2021-09-06 NOTE — BH NOTES
Dx: Depression w/SI    Affect restricted. Pleasant. Rated his depression and anxiety levels 5/10. Accepted all scheduled meds. Consumed 100% of meals. Cont to spend the majority of x, in his room. Encouraged to attend groups. No attempts to self harm; Q 15 mins checks maintained, for safety.

## 2021-09-06 NOTE — GROUP NOTE
[facility]  GROUP DOCUMENTATION INDIVIDUAL                                                                          Group Therapy Note    Date: 9/6/2021    Group Start Time: 1025  Group End Time: 1055  Group Topic: Nursing    [facility] 2 [facility]    Dianne Rhodes LPN IP  GROUP DOCUMENTATION GROUP    Group Therapy Note    Attendees: 6/14         Attendance: Did not attend    Patient's Goal:     Interventions/techniques: Follows Directions:     Interactions:     Mental Status:     Behavior/appearance:     Goals Achieved: Additional Notes:  Pt. Invited  He did not attend.     [employer], LPN

## 2021-09-06 NOTE — GROUP NOTE
[facility]  GROUP DOCUMENTATION INDIVIDUAL                                                                          Group Therapy Note    Date: 9/6/2021    Group Start Time: 1320  Group End Time: 6700  Group Topic: Process Group - Inpatient    [facility] 2 BEHA HLTH ACUTE    Néstor Valeria    [facility] GROUP DOCUMENTATION GROUP    Group Therapy Note    Attendees: 4/13    Process: Pts were asked to share a colour to describe their mood as a check in question. Pts were then asked to write down things that they would say to themselves from five years ago. Pts were then encouraged to share things that they want to let go of. Pts were then encouraged to share things that they would say to their future selves.  Pts were then asked to reflect on group       Attendance: Did not attend  Additional Notes:  Pt was encouraged to attend but chose not to do so     [employer]

## 2021-09-07 PROCEDURE — 74011250636 HC RX REV CODE- 250/636: Performed by: PSYCHIATRY & NEUROLOGY

## 2021-09-07 PROCEDURE — 74011250637 HC RX REV CODE- 250/637: Performed by: PSYCHIATRY & NEUROLOGY

## 2021-09-07 PROCEDURE — 65220000003 HC RM SEMIPRIVATE PSYCH

## 2021-09-07 RX ADMIN — LITHIUM CARBONATE 300 MG: 300 TABLET ORAL at 08:32

## 2021-09-07 RX ADMIN — BENZTROPINE MESYLATE 1 MG: 1 TABLET ORAL at 21:39

## 2021-09-07 RX ADMIN — FLUOXETINE 40 MG: 20 CAPSULE ORAL at 08:32

## 2021-09-07 RX ADMIN — BUPROPION HYDROCHLORIDE 150 MG: 150 TABLET, FILM COATED, EXTENDED RELEASE ORAL at 21:39

## 2021-09-07 RX ADMIN — LITHIUM CARBONATE 600 MG: 300 TABLET ORAL at 21:39

## 2021-09-07 RX ADMIN — RISPERIDONE 3 MG: 2 TABLET ORAL at 21:39

## 2021-09-07 RX ADMIN — FLUOXETINE 20 MG: 20 CAPSULE ORAL at 21:39

## 2021-09-07 RX ADMIN — BENZTROPINE MESYLATE 1 MG: 1 TABLET ORAL at 08:32

## 2021-09-07 RX ADMIN — HALOPERIDOL DECANOATE 100 MG: 50 INJECTION INTRAMUSCULAR at 15:00

## 2021-09-07 RX ADMIN — RISPERIDONE 3 MG: 2 TABLET ORAL at 08:32

## 2021-09-07 NOTE — GROUP NOTE
[facility]  GROUP DOCUMENTATION INDIVIDUAL                                                                          Group Therapy Note    Date: 9/7/2021    Group Start Time: 1033  Group End Time: 9896  Group Topic: Nursing    [facility] 2 BEHA [facility]    Dianne Rhodes LPN IP  GROUP DOCUMENTATION GROUP    Group Therapy Note    Attendees: 5/13         Attendance: Did not attend    Patient's Goal:     Interventions/techniques: Follows Directions:     Interactions:     Mental Status:     Behavior/appearance:     Goals Achieved: Additional Notes:  Pt.  Invited he did not attend    [employer], LPN

## 2021-09-07 NOTE — BH NOTES
Behavioral Health Treatment Team Note     Patient goal(s) for today: go to groups   Treatment team focus/goals: continue medication management, group therapy and coordinate discharge     Progress note: Pt presented with a flat affect and withdrawn mood. Pt denied SI/HI/AVH and depression. Pt reported that the medicine is working well for him. Pt said that he is happy he came to get help. Writer spoke with pts brother. The brother reported that the pt had a change of behaviour since his last coming to the hospital for shakiness. Brother reported that they lowered the lithium dosage and this resulted in increase depression. Brother said that pt is more than welcome to return home and that he is usually very happy and chatty. Brother said that the pt is due for a 100mg haldol shot and usually gets it 'every two weeks'. He reported that the pt experiences delusions if he does not get the shot.  Pt still needs an inpatient level of care due to needing further medication management     LOS:  2  Expected LOS: 5-7 days    Insurance info/prescription coverage:  medicare  Date of last family contact:  today  Family requesting physician contact today:  no  Discharge plan:  Pt and therapist will work to determine   Guns in the home:  no   Outpatient provider(s):  None at this time, will be coordinated prior to discharge    Participating treatment team members: Sean Padgett, * (assigned SW), NAT Fowler

## 2021-09-07 NOTE — H&P
Initial psychiatric evaluation    Chief complaint  Unstable mood needs medication adjustments, hearing voices    History of present illness  24-year-old male with history of schizoaffective disorder who presented to the emergency room with family specifically brother. He has been having hearing voices and paranoid ideations and his moods have been unstable. He has been talking about things that are not there such as picking up his mother when mother is actually . He has had recent medical hospitalization at [facility] but at that time I believe his lithium was decreased from 900 mg total to 600 mg total and family reports that this affected his stability of mood. He has been also having aggressive behaviors such as banging his head on the bedroom draws. Patient reports that he has been feeling depressed and anxious. He is polite and appreciative but admits that he has been struggling he has been preoccupied with needs to fix his meds. We have discussed adjusting his medications which she appreciates. Taking and tolerating medications well.   Presently reports hearing voices and suicidal ideations and depressed mood and family reports some unusual worsening behaviors that requires adjustments in his medications    Past psychiatric history  Has previous psychiatric hospitalization at [facility] several years ago  Has long history of mental illness 30 years mostly psychotic symptoms  Sees Dr. Marva Sousa and has history of schizoaffective disorder    Family psychiatric history  Denied    Drug and alcohol history  Unremarkable    Medical history  No active medical problems    Review of systems and physical examination  Please see medical H&P in chart    No legal problems    Family history  Resides with his brother was supportive    Mental status exam  Alert oriented cooperative  Speech is goal-directed spontaneous  Mood is depressed  Affect is anxious  Positive preoccupied  Positive paranoid ideations  Labile affect  Insight and judgment fair    Diagnosis  Schizoaffective disorder    Recommendations  Patient is appropriate for psychiatric hospitalization which is medically necessary  I will maintain him on his current medication regimen except we will increase his lithium to  900 mg total daily as requested by family  He will take lithium 300 mg in the morning and 600 mg at night  Discussed safety issues  Follow-up with psychiatric team again tomorrow

## 2021-09-07 NOTE — BH NOTES
Nursing Note    Patient is alert and oriented x 4. He denies any SI/HI/AH/VH. Restricted affect and calm mood. Patient denies any anxiety or depression. He remained in his room for the majority of the shift except pacing the hallways momentarily. Patient accepted his medication without difficulty. Staff will continue to monitor patient for safety.

## 2021-09-08 PROCEDURE — 65220000003 HC RM SEMIPRIVATE PSYCH

## 2021-09-08 PROCEDURE — 74011250637 HC RX REV CODE- 250/637: Performed by: PSYCHIATRY & NEUROLOGY

## 2021-09-08 RX ADMIN — LITHIUM CARBONATE 300 MG: 300 TABLET ORAL at 09:06

## 2021-09-08 RX ADMIN — FLUOXETINE 40 MG: 20 CAPSULE ORAL at 09:07

## 2021-09-08 RX ADMIN — RISPERIDONE 3 MG: 2 TABLET ORAL at 21:07

## 2021-09-08 RX ADMIN — BENZTROPINE MESYLATE 1 MG: 1 TABLET ORAL at 09:07

## 2021-09-08 RX ADMIN — FLUOXETINE 20 MG: 20 CAPSULE ORAL at 21:06

## 2021-09-08 RX ADMIN — BENZTROPINE MESYLATE 1 MG: 1 TABLET ORAL at 21:06

## 2021-09-08 RX ADMIN — LITHIUM CARBONATE 600 MG: 300 TABLET ORAL at 21:07

## 2021-09-08 RX ADMIN — RISPERIDONE 3 MG: 2 TABLET ORAL at 09:06

## 2021-09-08 RX ADMIN — BUPROPION HYDROCHLORIDE 150 MG: 150 TABLET, FILM COATED, EXTENDED RELEASE ORAL at 21:07

## 2021-09-08 NOTE — BH NOTES
Patient case reviewed in the treatment team admitted by Dr. Pako Nelson apparently he admitted for medications he has a psychiatrist but could not tell me the name of the psychiatrist what medication why he could not get medication from him he want to go home and he is isolating in in the room but brent nj will need to talk with his brother and he did give permission to talk with the brother.   And her vital signs are stable no new labs resulted continued inpatient level of care indicated we will get in touch with the brother for collateral information and feedback and disposition[employer]. isolating himself encouraged to attend all the groups

## 2021-09-08 NOTE — GROUP NOTE
IP  GROUP DOCUMENTATION INDIVIDUAL                                                                          Group Therapy Note    Date: 9/8/2021    Group Start Time: 0910  Group End Time: 5424  Group Topic: [employer] Meeting    [facility] 2 BH NON ACUTE    James Artis RN    IP [facility] GROUP DOCUMENTATION GROUP    Group Therapy Note    Attendees 7             Attendance: {[facility] GROUP DOC ATTENDANCE:65074}    Patient's Goal:  ***    Interventions/techniques: {[facility] GROUP DOC INTERVENTIONS/TECHNIQUES:11831}    Follows Directions: {[facility] GROUP DOC FOLLOWS DIRECTION:58778}    Interactions: {[facility] GROUP DOC INTERACTIONS:98867}    Mental Status: {[facility] GROUP DOC MENTAL STATUS:49541}    Behavior/appearance: {[facility] GROUP DOC BEHAVIOR/APPEARANCE:92874}    Goals Achieved: {[facility] GROUP DOC GOALS ACHIEVED:46943}      Additional Notes:  ***    Josep Bundy RN

## 2021-09-08 NOTE — GROUP NOTE
[facility]  GROUP DOCUMENTATION INDIVIDUAL                                                                          Group Therapy Note    Date: 9/8/2021    Group Start Time: 1300  Group End Time: 1400  Group Topic: Education Group - Inpatient    [facility] CARE MANAGEMENT    Craig Kaiser    [facility] GROUP DOCUMENTATION GROUP    Group Therapy Note Pt asked to to complete safety plan. Pt discussed various ways of coping with every day stressors. Pt's also discussed what they expect to learn from the group and how they can deal with situations when they are alone without others to help the calm down. Attendees: 12         Attendance: Attended    Patient's Goal: Manage his medications better. Interventions/techniques: Provide feedback and Supported    Follows Directions:  Followed directions    Interactions: Unable to interact    Mental Status: Flat    Behavior/appearance: Cooperative and Withdrawn/quiet    Goals Achieved: Able to listen to others      Additional Notes: Pt appeared to be distracted during the meeting     [employer]

## 2021-09-08 NOTE — GROUP NOTE
[facility] GROUP DOCUMENTATION INDIVIDUAL                                                                          Group Therapy Note    Date: 9/8/2021    Group Start Time: 4474  Group End Time: 4840  Group Topic: Nursing    [facility]    Yumiko Woodson RN    IP [address] [facility] [address] GROUP DOCUMENTATION GROUP    Group Therapy Note    Attendees:  Did not attend group.        Attendance: {[facility] GROUP DOC ATTENDANCE:20532}    Patient's Goal:  ***    Interventions/techniques: {[facility] GROUP DOC INTERVENTIONS/TECHNIQUES:30748}    Follows Directions: {[facility] GROUP DOC FOLLOWS DIRECTION:30252}    Interactions: {[facility] GROUP DOC INTERACTIONS:48677}    Mental Status: {[facility] GROUP DOC MENTAL STATUS:31572}    Behavior/appearance: {[facility] GROUP DOC BEHAVIOR/APPEARANCE:93915}    Goals Achieved: {[facility] GROUP DOC GOALS ACHIEVED:07733}      Additional Notes:  ***    Jonatan Arechiga RN

## 2021-09-08 NOTE — BH NOTES
Patient alert and oriented. Patient visible and pleasant. Patient denied SI/HI/AVH and any anxiety. Patient states that his depression is a 5/10. Patient asked multiple times about going home tomorrow. Writer explained to patient that he would have to speak to the MD and his case manger in the morning. Patient acknowledged what the writer said but asked again and writer explained the same thing each time. Patient contracted for safety.

## 2021-09-08 NOTE — GROUP NOTE
IP  GROUP DOCUMENTATION INDIVIDUAL                                                                          Group Therapy Note    Date: 9/8/2021    Group Start Time: 0930  Group End Time: 1000  Group Topic: Nursing    [facility] 2  NON ACUTE    Dirk Nissen, RN    IP [facility] GROUP DOCUMENTATION GROUP    Group Therapy Note    Attendees: 7         Attendance: Attended    Patient's Goal:  To be discharged    Interventions/techniques: Validated    Follows Directions: Followed directions    Interactions: Interacted appropriately    Mental Status: Calm    Behavior/appearance: Attentive    Goals Achieved: Discussed coping      Additional Notes:  Discussed d/c plans and importance of sleep hygiene.     Tabitha Jeffrey RN

## 2021-09-08 NOTE — GROUP NOTE
[facility] GROUP DOCUMENTATION INDIVIDUAL                                                                          Group Therapy Note    Date: 9/8/2021    Group Start Time: 0940  Group End Time: 0591  Group Topic: [employer] Meeting    [facility] 2 BEHA TH ACUTE    Lestine Skeeters    IP [address] [facility] GROUP DOCUMENTATION GROUP    Group Therapy Note    Attendees: 6       Attendance: {[facility] GROUP DOC ATTENDANCE:41111}    Patient's Goal:  ***    Interventions/techniques: {[facility] GROUP DOC INTERVENTIONS/TECHNIQUES:39202}    Follows Directions: {[facility] GROUP DOC FOLLOWS DIRECTION:35199}    Interactions: {[facility] GROUP DOC INTERACTIONS:60293}    Mental Status: {[facility] GROUP DOC MENTAL STATUS:45061}    Behavior/appearance: {[facility] GROUP DOC BEHAVIOR/APPEARANCE:23215}    Goals Achieved: {[facility] GROUP DOC GOALS ACHIEVED:38525}      Additional Notes:  ***    Negrito Patel

## 2021-09-08 NOTE — PROGRESS NOTES
Problem: Falls - Risk of  Goal: *Absence of Falls  Description: Document Tabitha Spring Fall Risk and appropriate interventions in the flowsheet.   Outcome: Progressing Towards Goal  Note: Fall Risk Interventions:                                Problem: Depressed Mood (Adult/Pediatric)  Goal: *STG: Participates in treatment plan  Outcome: Progressing Towards Goal  Goal: *STG: Attends activities and groups  Outcome: Progressing Towards Goal  Goal: *STG: Remains safe in hospital  Outcome: Progressing Towards Goal

## 2021-09-08 NOTE — PROGRESS NOTES
Problem: Falls - Risk of  Goal: *Absence of Falls  Description: Document [place] [facility] Fall Risk and appropriate interventions in the flowsheet. Outcome: Progressing Towards Goal  Note: Fall Risk Interventions:                 Patient has been free from falls during this admission.

## 2021-09-08 NOTE — BH NOTES
Patient states that his goal today was to be discharge. Patient understands that he needs to be less isolative and out of his room more. He did attend groups today. He has been smiling at times and states he knows he needs to continue to take his medication when he is discharged. He will be returning to his brother's home. His  is looking for further resources. Patient had his Haldol Decanoate on yesterday and tolerated it well.

## 2021-09-08 NOTE — BH NOTES
Behavioral Health Treatment Team Note     Patient goal(s) for today: Go to groups  Treatment team focus/goals: Continue medication management, group therapy. Progress note: Pt presented with a flat affect and sad mood. Pt is properly dressed but poorly groomed. Pt is cooperative with the interviewer and is an adequate historian. Pt talked in a low barely audible voice, and slow in pace. Pt denies any SI/HI and AVH. Pt's flow of thought was disorganized but coherent. Pt stated that his brother brought him to the hospital because he was acting strange. Pt was not able to describe what strange meant. Pt reports that he lives with his brother and that the brother advised him to go to the hospital because he was not taking his medications properly. Pt shows some insight and judgement regarding his illness and need for help. Inpatient level of care indicated due to need for continued case management, medication management, and therapeutic intervention needed. LOS:  3  Expected LOS: 5-7    Insurance info/prescription coverage:  Medicare  Date of last family contact:  09/07/21  Family requesting physician contact today:  no  Discharge plan:  Pt and therapist will work to determine  Guns in the home:  no   Outpatient provider(s):  None at this time. Will be coordinated prior to discharge.     Participating treatment team members: Olga Navarro, * (assigned SW intern), Michael Hi

## 2021-09-09 PROCEDURE — 74011250637 HC RX REV CODE- 250/637: Performed by: PSYCHIATRY & NEUROLOGY

## 2021-09-09 PROCEDURE — 65220000003 HC RM SEMIPRIVATE PSYCH

## 2021-09-09 RX ADMIN — RISPERIDONE 3 MG: 2 TABLET ORAL at 08:50

## 2021-09-09 RX ADMIN — BUPROPION HYDROCHLORIDE 150 MG: 150 TABLET, FILM COATED, EXTENDED RELEASE ORAL at 21:16

## 2021-09-09 RX ADMIN — RISPERIDONE 3 MG: 2 TABLET ORAL at 21:16

## 2021-09-09 RX ADMIN — FLUOXETINE 20 MG: 20 CAPSULE ORAL at 21:17

## 2021-09-09 RX ADMIN — FLUOXETINE 40 MG: 20 CAPSULE ORAL at 08:49

## 2021-09-09 RX ADMIN — LITHIUM CARBONATE 300 MG: 300 TABLET ORAL at 08:50

## 2021-09-09 RX ADMIN — LITHIUM CARBONATE 600 MG: 300 TABLET ORAL at 21:17

## 2021-09-09 RX ADMIN — BENZTROPINE MESYLATE 1 MG: 1 TABLET ORAL at 08:50

## 2021-09-09 RX ADMIN — BENZTROPINE MESYLATE 1 MG: 1 TABLET ORAL at 21:16

## 2021-09-09 NOTE — GROUP NOTE
IP  GROUP DOCUMENTATION INDIVIDUAL                                                                          Group Therapy Note    Date: 9/9/2021    Group Start Time: 5913  Group End Time: 0170  Group Topic: Recreational/Music Therapy    SR[facility] 2 BH NON ACUTE    Alejo Million    IP [address] [facility] GROUP DOCUMENTATION GROUP    Group Therapy Note    Facilitated leisure skills group to reinforce positive coping through music, social interaction, group participation and arts/crafts    Attendees: 12/12         Attendance: Attended    Patient's Goal:  Attend group daily     Interventions/techniques: Art integration and Supported    Follows Directions: Followed directions    Interactions: Interacted appropriately    Mental Status: Calm    Behavior/appearance: Cooperative and Motivated    Goals Achieved: Able to engage in interactions and Able to listen to others      Additional Notes:  Receptive to listening to music while working on leisure task.  Interacted with staff when prompted    MIKEL Desir

## 2021-09-09 NOTE — PROGRESS NOTES
Problem: Falls - Risk of  Goal: *Absence of Falls  Description: Document Meghna Elias Fall Risk and appropriate interventions in the flowsheet.   Outcome: Progressing Towards Goal  Note: Fall Risk Interventions:                                Problem: Patient Education: Go to Patient Education Activity  Goal: Patient/Family Education  Outcome: Progressing Towards Goal     Problem: Depressed Mood (Adult/Pediatric)  Goal: *STG: Complies with medication therapy  Outcome: Progressing Towards Goal     Problem: Anxiety  Goal: *Alleviation of anxiety  Outcome: Progressing Towards Goal     Problem: Suicide  Goal: *STG: Remains safe in hospital  Outcome: Progressing Towards Goal

## 2021-09-09 NOTE — BH NOTES
Behavioral Health Treatment Team Note     Patient goal(s) for today: 'get ready to go home'  Treatment team focus/goals: continue medication management, group therapy and coordinate discharge    Progress note: Pt presented with a broad affect and congruent mood. Pt denied SI/HI/AVH and depression/anxiety. Pt reported he is 'feeling much better' and that he is feeling 'ready' to go home. Pt said he feels good on the medication and that he will let his psychiatrist know when he sees him soon. Pt said his brother would be able to  her tomorrow.  Pt still needs an inpatient level of care due to needing to coordinate a safe discharge plan    LOS:  4  Expected LOS: 5 days     Insurance info/prescription coverage:  [employer] Medicare   Date of last family contact:  9.7.21  Family requesting physician contact today:  no  Discharge plan:  Pt will return home with outpatient services coordinated   Guns in the home:  no   Outpatient provider(s):  Dr Mel Ahumada    Participating treatment team members: Solitario Serrano, * (assigned SW), [facility]

## 2021-09-09 NOTE — BH NOTES
Patient case discussed in the treatment team feedback from his brother patient moved from acute to progressive side is still isolating himself encouraged her to get out of the room and attend all the groups less isolated demonstrates improvement he did receive a Haldol Decanoate yesterday no side effect not suicidal not homicidal.  Vital signs are stable no new labs resulted and mood bright smiles continued inpatient level of care indicated for the medication to give the full benefit

## 2021-09-09 NOTE — PROGRESS NOTES
Patient alert and oriented x4. Patient denies si/hi/avh. Patient denies anxiety and depression. Patient is in milieu and attending groups. Patient has a flat affect and pleasant mood. Patient is medication compliant, calm and cooperative at this time. Will continue to monitor.

## 2021-09-09 NOTE — GROUP NOTE
[facility]  GROUP DOCUMENTATION INDIVIDUAL                                                                          Group Therapy Note    Date: 9/9/2021    Group Start Time: 0935  Group End Time: 1020  Group Topic: Education Group - Inpatient    SR[facility] 2 BH NON ACUTE    Krystle Valenzuela  GROUP DOCUMENTATION GROUP    Group Therapy Note    Facilitated discussion focus on identifying different barriers/defense mechanisms that has prevented progress and identifying ways to confront them    Attendees: 9/12         Attendance: Attended    Patient's Goal:  Attend group daily     Interventions/techniques: Informed and Supported    Follows Directions: Followed directions    Interactions: Interacted appropriately    Mental Status: Calm    Behavior/appearance: Cooperative    Goals Achieved: Able to engage in interactions and Able to listen to others      Additional Notes:  Receptive to information discussed on different barriers and was able to identify his biggest barrier such as \"worry\". Did not identify ways to confront it.     Pk Harman, BLANKAS

## 2021-09-09 NOTE — BH NOTES
Patient alert and oriented. Patient bright and smiling denies SI/HI/AVH. Patient anxiety 5/10 and depression 3/10. Patient visible on unit and interacting appropriately with staff in the day room and on the unit. Patient compliant with medication. Patient contracted for safety.

## 2021-09-09 NOTE — GROUP NOTE
[facility]  GROUP DOCUMENTATION INDIVIDUAL                                                                          Group Therapy Note    Date: 9/9/2021    Group Start Time: 3622  Group End Time: 8310  Group Topic: [employer]    SR[facility] 2 BH NON ACUTE    Stefany Burns    IP [address] [facility] GROUP DOCUMENTATION GROUP    Group Therapy Note    Attendees:10         Attendance: Attended    Patient's Goal:  Pt  wants to be discharged. Interventions/techniques: Supported    Follows Directions:  Followed directions    Interactions: Interacted appropriately    Mental Status: Other okay    Behavior/appearance: Cooperative    Goals Achieved: Able to engage in interactions, Able to listen to others and Identified feelings      Additional Notes:      Shadia Marley

## 2021-09-09 NOTE — GROUP NOTE
[facility] GROUP DOCUMENTATION INDIVIDUAL                                                                          Group Therapy Note    Date: 9/9/2021    Group Start Time: 1115  Group End Time: 1200  Group Topic: Process Group - Inpatient    [facility] CARE MANAGEMENT    Archana Reevesmariposa    IP [address] [facility] GROUP DOCUMENTATION GROUP    Group Therapy Note  In process group members shared goals/accomplishments they have already completed and are proud of and reflected on these accomplishments. Group members also shared goals they have started and want to complete and reflected on ways in which they can complete the goals. Group members also shared goals they have not yet started but hope to start and reflected on how to motivate themselves to accomplish these goals. Attendees: 10/12         Attendance: Did not attend      Additional Notes:  Pt was encouraged to come to the group but did not do so.      Thais Springer

## 2021-09-09 NOTE — GROUP NOTE
IP  GROUP DOCUMENTATION INDIVIDUAL                                                                          Group Therapy Note    Date: 9/8/2021    Group Start Time: 1815  Group End Time: 1900  Group Topic: Reflection/Relaxation    SRM 2  NON ACUTE    Sarath Lockwood    IP [address] [facility] GROUP DOCUMENTATION GROUP    Group Therapy Note    Attendees: 10/12  Facilitated structured group to introduce Mindfulness and Meditation as positive way to cope and manage daily stressors. Introduced relaxation technique using Mandalas to practice relaxation in group       Attendance: Attended    Patient's Goal: STG: Attends activities and groups      Interventions/techniques: Art integration and Supported    Follows Directions: Followed directions    Interactions: Interacted appropriately    Mental Status: Calm and Flat    Behavior/appearance: Attentive and Cooperative    Goals Achieved: Able to engage in interactions and Able to listen to others      Additional Notes: Attended group and was receptive to intervention. Received material provided in group and was attentive during group discussion. Participated in relaxation technique of listening to instrumentals and coloring mandalas. Verbalized enjoyment of group. Attended entire session.      MIKEL Robles

## 2021-09-09 NOTE — PROGRESS NOTES
Problem: Depressed Mood (Adult/Pediatric)  Goal: *STG: Remains safe in hospital  Outcome: Progressing Towards Goal     Problem: Depressed Mood (Adult/Pediatric)  Goal: *STG: Complies with medication therapy  Outcome: Progressing Towards Goal     Patient states his depression is better 3/10 today.

## 2021-09-10 VITALS
RESPIRATION RATE: 18 BRPM | TEMPERATURE: 98.2 F | BODY MASS INDEX: 24.16 KG/M2 | OXYGEN SATURATION: 99 % | WEIGHT: 145 LBS | DIASTOLIC BLOOD PRESSURE: 84 MMHG | HEIGHT: 65 IN | SYSTOLIC BLOOD PRESSURE: 129 MMHG | HEART RATE: 70 BPM

## 2021-09-10 PROCEDURE — 74011250637 HC RX REV CODE- 250/637: Performed by: PSYCHIATRY & NEUROLOGY

## 2021-09-10 RX ORDER — FLUOXETINE HYDROCHLORIDE 40 MG/1
40 CAPSULE ORAL DAILY
Qty: 30 CAPSULE | Refills: 0 | Status: SHIPPED | OUTPATIENT
Start: 2021-09-11 | End: 2022-08-25 | Stop reason: ALTCHOICE

## 2021-09-10 RX ORDER — BENZTROPINE MESYLATE 1 MG/1
1 TABLET ORAL 2 TIMES DAILY
Qty: 60 TABLET | Refills: 0 | Status: SHIPPED | OUTPATIENT
Start: 2021-09-10 | End: 2022-05-12 | Stop reason: ALTCHOICE

## 2021-09-10 RX ORDER — RISPERIDONE 3 MG/1
3 TABLET, FILM COATED ORAL 2 TIMES DAILY
Qty: 60 TABLET | Refills: 0 | Status: SHIPPED | OUTPATIENT
Start: 2021-09-10 | End: 2022-10-24

## 2021-09-10 RX ORDER — BUPROPION HYDROCHLORIDE 150 MG/1
150 TABLET ORAL
Qty: 30 TABLET | Refills: 0 | Status: SHIPPED | OUTPATIENT
Start: 2021-09-10 | End: 2022-10-24

## 2021-09-10 RX ORDER — LITHIUM CARBONATE 300 MG
300 TABLET ORAL EVERY MORNING
Qty: 30 TABLET | Refills: 0 | Status: SHIPPED | OUTPATIENT
Start: 2021-09-11 | End: 2022-05-12 | Stop reason: ALTCHOICE

## 2021-09-10 RX ORDER — TRAZODONE HYDROCHLORIDE 50 MG/1
50 TABLET ORAL
Qty: 30 TABLET | Refills: 0 | Status: SHIPPED | OUTPATIENT
Start: 2021-09-10 | End: 2022-02-10 | Stop reason: ALTCHOICE

## 2021-09-10 RX ORDER — HALOPERIDOL DECANOATE 100 MG/ML
100 INJECTION INTRAMUSCULAR
Status: DISCONTINUED | OUTPATIENT
Start: 2021-09-13 | End: 2021-09-10

## 2021-09-10 RX ORDER — FLUOXETINE HYDROCHLORIDE 20 MG/1
20 CAPSULE ORAL
Qty: 30 CAPSULE | Refills: 0 | Status: SHIPPED | OUTPATIENT
Start: 2021-09-10 | End: 2022-08-25 | Stop reason: ALTCHOICE

## 2021-09-10 RX ORDER — LITHIUM CARBONATE 300 MG
600 TABLET ORAL
Qty: 30 TABLET | Refills: 0 | Status: SHIPPED | OUTPATIENT
Start: 2021-09-10 | End: 2021-10-19 | Stop reason: SDUPTHER

## 2021-09-10 RX ADMIN — BENZTROPINE MESYLATE 1 MG: 1 TABLET ORAL at 09:23

## 2021-09-10 RX ADMIN — FLUOXETINE 40 MG: 20 CAPSULE ORAL at 09:23

## 2021-09-10 RX ADMIN — LITHIUM CARBONATE 300 MG: 300 TABLET ORAL at 09:23

## 2021-09-10 RX ADMIN — RISPERIDONE 3 MG: 2 TABLET ORAL at 09:23

## 2021-09-10 NOTE — ED PROVIDER NOTES
EMERGENCY DEPARTMENT HISTORY AND PHYSICAL EXAM      Date: 9/4/2021  Patient Name: Inessa Christopher    History of Presenting Illness     Chief Complaint   Patient presents with    Mental Health Problem       History Provided By: Patient    HPI: [de-identified] T Candi, 60 y.o. male with PMHx as noted below presents the emergency department for outbursts as well as suicidal ideation. Symptoms cough without rating or exacerbating factors. Denies any medical complaints. Denies any attempt at self-harm today. .    Pt denies any other alleviating or exacerbating factors. Additionally, pt specifically denies any recent fever, chills, headache, nausea, vomiting, abdominal pain, CP, SOB, lightheadedness, dizziness, numbness, weakness, BLE swelling, heart palpitations, urinary sxs, diarrhea, constipation, melena, hematochezia, cough, or congestion.   PCP: Reji Paz DO    Current Facility-Administered Medications   Medication Dose Route Frequency Provider Last Rate Last Admin    traZODone (DESYREL) tablet 50 mg  50 mg Oral QHS PRN Nichole Hickey MD        acetaminophen (TYLENOL) tablet 650 mg  650 mg Oral Q4H PRN Jose Suarez MD        magnesium hydroxide (MILK OF MAGNESIA) 400 mg/5 mL oral suspension 30 mL  30 mL Oral DAILY PRN Jose Suarez MD        hydrOXYzine HCL (ATARAX) tablet 50 mg  50 mg Oral TID PRN Jose Suarez MD        FLUoxetine (PROzac) capsule 40 mg  40 mg Oral DAILY Nichole Hickey MD   40 mg at 09/09/21 0849    FLUoxetine (PROzac) capsule 20 mg  20 mg Oral QHS Radha Suarez MD   20 mg at 09/09/21 2117    risperiDONE (RisperDAL) tablet 3 mg  3 mg Oral BID Edilia BARAJAS MD   3 mg at 09/09/21 2116    lithium carbonate tablet 300 mg  300 mg Oral Radha Ruvalcaba MD   300 mg at 09/09/21 0850    lithium carbonate tablet 600 mg  600 mg Oral QHS Fidel Suarez MD   600 mg at 09/09/21 2117    benztropine (COGENTIN) tablet 1 mg  1 mg Oral BID Nichole Hickey MD   1 mg at 09/09/21 2116    buPROPion XL (WELLBUTRIN XL) tablet 150 mg  150 mg Oral QHS Florencia Brown MD   150 mg at 09/09/21 2116       Past History     Past Medical History:  Past Medical History:   Diagnosis Date    Anxiety     Depression     Intellectual disability     Schizoaffective disorder, bipolar type ([facility]     Schizophrenia ([facility]        Past Surgical History:  History reviewed. No pertinent surgical history. Family History:  Family History   Problem Relation Age of Onset    Cancer Father         bone    Pancreatic Cancer Father     Dementia Mother        Social History:  Social History     Tobacco Use    Smoking status: Never Smoker    Smokeless tobacco: Never Used   Vaping Use    Vaping Use: Never used   Substance Use Topics    Alcohol use: Never    Drug use: Never       Allergies:  No Known Allergies      Review of Systems   Review of Systems   Constitutional: Negative for chills, fatigue and fever. HENT: Negative for congestion, ear pain and rhinorrhea. Eyes: Negative for pain and visual disturbance. Respiratory: Negative for cough and shortness of breath. Cardiovascular: Negative for chest pain and leg swelling. Gastrointestinal: Negative for abdominal pain, diarrhea, nausea and vomiting. Genitourinary: Negative for dysuria and flank pain. Musculoskeletal: Negative for back pain and neck pain. Skin: Negative for rash and wound. Neurological: Negative for dizziness, syncope and headaches. Psychiatric/Behavioral: Positive for suicidal ideas. Negative for self-injury. Physical Exam   Physical Exam    GENERAL: alert and oriented, no acute distress  EYES: PEERL, No injection, discharge or icterus. ENT: Mucous membranes pink and moist.  NECK: Supple  LUNGS: Airway patent. Non-labored respirations. Breath sounds clear with good air entry bilaterally. HEART: Regular rate and rhythm. No peripheral edema  ABDOMEN: Non-distended and non-tender, without guarding or rebound.   SKIN:  warm, dry  EXTREMITIES: Without swelling, tenderness or deformity, symmetric with normal ROM  NEUROLOGICAL: Alert, oriented      Diagnostic Study Results     Labs -  Reviewed    Radiologic Studies -   No orders to display     CT Results  (Last 48 hours)    None        CXR Results  (Last 48 hours)    None            Medical Decision Making     Scott ZAVALA MD am the first provider for this patient and am the attending of record for this patient encounter. I reviewed the vital signs, available nursing notes, past medical history, past surgical history, family history and social history. Vital Signs-Reviewed the patient's vital signs. Records Reviewed: Nursing Notes and Old Medical Records    Provider Notes (Medical Decision Making): On presentation the patient is well appearing, in no acute distress with normal vital signs. The patient presents to the Emergency Department for psychiatric evaluation. he specifically denies any intentional ingestions/overdoses. he also denies any acute medical complaints at this time and is only seeking psychiatric evaluation. After a thorough medical evaluation and basic labs including a CBC, CMP, UDS, etoh, and UA no emergent life threatening medical conditions identified and she is medically clear for further psychiatric evaluation. ED Course:   Initial assessment performed. The patients presenting problems have been discussed, and they are in agreement with the care plan formulated and outlined with them. I have encouraged them to ask questions as they arise throughout their visit. Patient is being admitted to psych. The results of their tests and reasons for their admission have been discussed with them and/or available family. They convey agreement and understanding for the need to be admitted and for their admission diagnosis.   Consultation has been made with the inpatient physician specialist for hospitalization. Disposition:  admission    PLAN:  1. Admit     Diagnosis     Clinical Impression:   1. Schizoaffective disorder, unspecified type [facility])        Please note that this dictation was completed with Dragon, computer voice recognition software. Quite often unanticipated grammatical, syntax, homophones, and other interpretive errors are inadvertently transcribed by the computer software. Please disregard these errors. Additionally, please excuse any errors that have escaped final proofreading.

## 2021-09-10 NOTE — PROGRESS NOTES
Problem: Falls - Risk of  Goal: *Absence of Falls  Description: Document Nichole Grace Fall Risk and appropriate interventions in the flowsheet.   Outcome: Progressing Towards Goal  Note: Fall Risk Interventions:                                Problem: Patient Education: Go to Patient Education Activity  Goal: Patient/Family Education  Outcome: Progressing Towards Goal     Problem: Depressed Mood (Adult/Pediatric)  Goal: *STG: Participates in treatment plan  Outcome: Progressing Towards Goal  Goal: *STG: Participates in 1:1 therapy sessions  Outcome: Progressing Towards Goal  Goal: *STG: Verbalizes anger, guilt, and other feelings in a constructive manor  Outcome: Progressing Towards Goal  Goal: *STG: Attends activities and groups  Outcome: Progressing Towards Goal  Goal: *STG: Demonstrates reduction in symptoms and increase in insight into coping skills/future focused  Outcome: Progressing Towards Goal  Goal: *STG: Remains safe in hospital  Outcome: Progressing Towards Goal  Goal: *STG: Complies with medication therapy  Outcome: Progressing Towards Goal  Goal: *LTG: Returns to previous level of functioning and participates with after care plan  Outcome: Progressing Towards Goal  Goal: *LTG: Understands illness and can identify signs of relapse  Outcome: Progressing Towards Goal  Goal: Interventions  Outcome: Progressing Towards Goal     Problem: Patient Education: Go to Patient Education Activity  Goal: Patient/Family Education  Outcome: Progressing Towards Goal     Problem: Anxiety  Goal: *Alleviation of anxiety  Outcome: Progressing Towards Goal  Goal: *Alleviation of anxiety (Palliative Care)  Outcome: Progressing Towards Goal     Problem: Patient Education: Go to Patient Education Activity  Goal: Patient/Family Education  Outcome: Progressing Towards Goal     Problem: Suicide  Goal: *STG: Remains safe in hospital  Outcome: Progressing Towards Goal  Goal: *STG: Seeks staff when feelings of self harm or harm towards others arise  Outcome: Progressing Towards Goal  Goal: *STG: Attends activities and groups  Outcome: Progressing Towards Goal  Goal: *STG:  Verbalizes alternative ways of dealing with maladaptive feelings/behaviors  Outcome: Progressing Towards Goal  Goal: *STG/LTG: Complies with medication therapy  Outcome: Progressing Towards Goal  Goal: *STG/LTG: No longer expresses self destructive or suicidal thoughts  Outcome: Progressing Towards Goal  Goal: *LTG:  Identifies available community resources  Outcome: Progressing Towards Goal  Goal: *LTG:  Develops proactive suicide prevention plan  Outcome: Progressing Towards Goal  Goal: Interventions  Outcome: Progressing Towards Goal     Problem: Patient Education: Go to Patient Education Activity  Goal: Patient/Family Education  Outcome: Progressing Towards Goal

## 2021-09-10 NOTE — BH NOTES
Patient case discussed in the treatment team patient seen he is getting out of the room more he is on the progressive side mood better and he says medication helping and would like to get and on time for the medication getting and be stable and when the time comes his brother can take him anticipated discharge tomorrow or the Friday in 10 September no side effects is alert verbal clean, and not suicidal not hallucinating no side effects good energy good motivation encouraged to attend all the groups no side effects continued inpatient level of care until we discharge him tomorrow needed

## 2021-09-10 NOTE — BH NOTES
DISCHARGE SUMMARY    NAME:Shirley Christopher  : 1965  MRN: [id number]    The patient Shirley Christopher exhibits the ability to control behavior in a less restrictive environment. Patient's level of functioning is improving. No assaultive/destructive behavior has been observed for the past 24 hours. No suicidal/homicidal threat or behavior has been observed for the past 24 hours. There is no evidence of serious medication side effects. Patient has not been in physical or protective restraints for at least the past 24 hours. If weapons involved, how are they secured? n/a    Is patient aware of and in agreement with discharge plan? yes    Arrangements for medication:  Prescriptions given to patient, given a weeks supply or 30 day supply. Copy of discharge instructions to provider?:  yes    Arrangements for transportation home:  Pt is being picked up by his brother at 12:30p    Keep all follow up appointments as scheduled, continue to take prescribed medications per physician instructions.   Mental health crisis number:  884 or your local mental health crisis line number at [place] [address] Emergency WARM LINE      1-727-006-MHAV (0389)      M-F: 9am to 9pm      Sat & Sun: 5pm - 9pm  National suicide prevention lines:                             4-257-WKWIQRR (1-513-071-410-209-6603)       9-941-135-TALK (9-585-655-133-604-2853)    Crisis Text Line:  Text HOME to 486186

## 2021-09-10 NOTE — PROGRESS NOTES
Patient alert and oriented x4. Patient denies si/hi/avh. Patient denies anxiety and depression. Patient in milieu watching television and attends groups. Patient is smiling and pleasant. Patient is medication compliant, calm and cooperative at this time. Will continue monitor.

## 2021-09-10 NOTE — BH NOTES
Patient alert and oriented. Patient denied SI/HI/AVH. Patient states his anxiety and depression is 2/10. Patient bright and visible in day room and interacting appropriately with peers and staff. Patient compliant with medication and contracts for safety.

## 2021-09-10 NOTE — PROGRESS NOTES
Problem: Falls - Risk of  Goal: *Absence of Falls  Description: Document Ayaka Long Fall Risk and appropriate interventions in the flowsheet. Outcome: Progressing Towards Goal  Note: Fall Risk Interventions:       Patient has been free from falls during this admission.

## 2021-09-10 NOTE — BH NOTES
Behavioral Health Transition Record to Provider    Patient Name: Devin Hernandez  YOB: 1965  Medical Record Number: [id number]  Date of Admission: 9/4/2021  Date of Discharge: 9.10.21    Attending Provider: Merary Hall MD  Discharging Provider: Dr Robert Kelly  To contact this individual call [phone] and ask the  to page. If unavailable, ask to be transferred to [facility] Provider on call. [facility] Provider will be available on call 24/7 and during holidays. Primary Care Provider: Sandra Pang DO    No Known Allergies    Reason for Admission: Pt was admitted for suicidal ideation, increased depression. Pt also presented with delusions     Admission Diagnosis: Depression with suicidal ideation [F32.9, R45.851]    * No surgery found *    Results for orders placed or performed during the hospital encounter of 09/04/21   SARS-COV-2   Result Value Ref Range    SARS-CoV-2 Not Detected Not Detected     DRUG SCREEN, URINE   Result Value Ref Range    AMPHETAMINES Negative Negative      BARBITURATES Negative Negative      BENZODIAZEPINES Negative Negative      COCAINE Negative Negative      METHADONE Negative Negative      OPIATES Negative Negative      PCP(PHENCYCLIDINE) Negative Negative      THC (TH-CANNABINOL) Negative Negative      Drug screen comment        This test is a screen for drugs of abuse in a medical setting only (i.e., they are unconfirmed results and as such must not be used for non-medical purposes, e.g.,employment testing, legal testing). Due to its inherent nature, false positive (FP) and false negative (FN) results may be obtained. Therefore, if necessary for medical care, recommend confirmation of positive findings by GC/MS.    CBC WITH AUTOMATED DIFF   Result Value Ref Range    WBC 5.8 4.1 - 11.1 K/uL    RBC 4.38 4.10 - 5.70 M/uL    HGB 13.1 12.1 - 17.0 g/dL    HCT 39.6 36.6 - 50.3 %    MCV 90.4 80.0 - 99.0 FL    MCH 29.9 26.0 - 34.0 PG    MCHC 33.1 30.0 - 36.5 g/dL    RDW 12.0 11.5 - 14.5 %    PLATELET 957 805 - 277 K/uL    MPV 10.6 8.9 - 12.9 FL    NRBC 0.0 0.0  WBC    ABSOLUTE NRBC 0.00 0.00 - 0.01 K/uL    NEUTROPHILS 61 32 - 75 %    LYMPHOCYTES 24 12 - 49 %    MONOCYTES 9 5 - 13 %    EOSINOPHILS 4 0 - 7 %    BASOPHILS 1 0 - 1 %    IMMATURE GRANULOCYTES 1 (H) 0 - 0.5 %    ABS. NEUTROPHILS 3.6 1.8 - 8.0 K/UL    ABS. LYMPHOCYTES 1.4 0.8 - 3.5 K/UL    ABS. MONOCYTES 0.5 0.0 - 1.0 K/UL    ABS. EOSINOPHILS 0.2 0.0 - 0.4 K/UL    ABS. BASOPHILS 0.0 0.0 - 0.1 K/UL    ABS. IMM. GRANS. 0.1 (H) 0.00 - 0.04 K/UL    DF AUTOMATED     METABOLIC PANEL, COMPREHENSIVE   Result Value Ref Range    Sodium 137 136 - 145 mmol/L    Potassium 3.4 (L) 3.5 - 5.1 mmol/L    Chloride 106 97 - 108 mmol/L    CO2 26 21 - 32 mmol/L    Anion gap 5 5 - 15 mmol/L    Glucose 107 (H) 65 - 100 mg/dL    BUN 20 6 - 20 mg/dL    Creatinine 1.00 0.70 - 1.30 mg/dL    BUN/Creatinine ratio 20 12 - 20      GFR est AA >60 >60 ml/min/1.73m2    GFR est non-AA >60 >60 ml/min/1.73m2    Calcium 8.6 8.5 - 10.1 mg/dL    Bilirubin, total 0.4 0.2 - 1.0 mg/dL    AST (SGOT) 15 15 - 37 U/L    ALT (SGPT) 28 12 - 78 U/L    Alk.  phosphatase 61 45 - 117 U/L    Protein, total 6.6 6.4 - 8.2 g/dL    Albumin 3.6 3.5 - 5.0 g/dL    Globulin 3.0 2.0 - 4.0 g/dL    A-G Ratio 1.2 1.1 - 2.2     ETHYL ALCOHOL   Result Value Ref Range    ALCOHOL(ETHYL),SERUM <4 <10 mg/dL   MAGNESIUM   Result Value Ref Range    Magnesium 1.9 1.6 - 2.4 mg/dL   METABOLIC PANEL, COMPREHENSIVE   Result Value Ref Range    Sodium 143 136 - 145 mmol/L    Potassium 3.6 3.5 - 5.1 mmol/L    Chloride 110 (H) 97 - 108 mmol/L    CO2 27 21 - 32 mmol/L    Anion gap 6 5 - 15 mmol/L    Glucose 90 65 - 100 mg/dL    BUN 14 6 - 20 mg/dL    Creatinine 0.93 0.70 - 1.30 mg/dL    BUN/Creatinine ratio 15 12 - 20      GFR est AA >60 >60 ml/min/1.73m2    GFR est non-AA >60 >60 ml/min/1.73m2    Calcium 8.7 8.5 - 10.1 mg/dL    Bilirubin, total 0.5 0.2 - 1.0 mg/dL    AST (SGOT) 11 (L) 15 - 37 U/L    ALT (SGPT) 27 12 - 78 U/L    Alk. phosphatase 62 45 - 117 U/L    Protein, total 6.6 6.4 - 8.2 g/dL    Albumin 3.4 (L) 3.5 - 5.0 g/dL    Globulin 3.2 2.0 - 4.0 g/dL    A-G Ratio 1.1 1.1 - 2.2         Immunizations administered during this encounter:   Immunization History   Administered Date(s) Administered    COVID-19, PFIZER, MRNA, LNP-S, PF, 30MCG/0.3ML DOSE 03/25/2021, 04/15/2021       Screening for Metabolic Disorders for Patients on Antipsychotic Medications  (Data obtained from the EMR)    Estimated Body Mass Index  Estimated body mass index is 24.13 kg/m² as calculated from the following:    Height as of this encounter: 5' 5\" (1.651 m). Weight as of this encounter: 65.8 kg (145 lb). Vital Signs/Blood Pressure  Visit Vitals  /84   Pulse 70   Temp 98.2 °F (36.8 °C)   Resp 18   Ht 5' 5\" (1.651 m)   Wt 65.8 kg (145 lb)   SpO2 99%   BMI 24.13 kg/m²       Blood Glucose/Hemoglobin A1c  Lab Results   Component Value Date/Time    Glucose 90 09/06/2021 05:26 AM    Glucose (POC) 108 06/16/2021 02:46 PM       Lab Results   Component Value Date/Time    Hemoglobin A1c 5.2 05/24/2021 02:13 PM    Hemoglobin A1c, External 5.1 01/30/2020 12:00 AM        Lipid Panel  Lab Results   Component Value Date/Time    Cholesterol, total 146 05/24/2021 02:13 PM    HDL Cholesterol 44 05/24/2021 02:13 PM    LDL, calculated 87 05/24/2021 02:13 PM    Triglyceride 75 05/24/2021 02:13 PM        Discharge Diagnosis: schizoaffective disorder    Discharge Plan: pt is returning home with outpatient services coordianted     Discharge Medication List and Instructions:   Current Discharge Medication List      START taking these medications    Details   traZODone (DESYREL) 50 mg tablet Take 1 Tablet by mouth nightly as needed for Sleep (For insomnia).   Qty: 30 Tablet, Refills: 0  Start date: 9/10/2021         CONTINUE these medications which have CHANGED    Details   buPROPion XL (WELLBUTRIN XL) 150 mg tablet Take 1 Tablet by mouth nightly. Qty: 30 Tablet, Refills: 0  Start date: 9/10/2021      FLUoxetine (PROzac) 40 mg capsule Take 1 Capsule by mouth daily. Qty: 30 Capsule, Refills: 0  Start date: 9/11/2021      !! lithium carbonate 300 mg tablet Take 1 Tablet by mouth Every morning. Qty: 30 Tablet, Refills: 0  Start date: 9/11/2021      !! lithium carbonate 300 mg tablet Take 2 Tablets by mouth nightly. Qty: 30 Tablet, Refills: 0  Start date: 9/10/2021      risperiDONE (RisperDAL) 3 mg tablet Take 1 Tablet by mouth two (2) times a day. Qty: 60 Tablet, Refills: 0  Start date: 9/10/2021      benztropine (COGENTIN) 1 mg tablet Take 1 Tablet by mouth two (2) times a day. Qty: 60 Tablet, Refills: 0  Start date: 9/10/2021       !! - Potential duplicate medications found. Please discuss with provider. STOP taking these medications       haloperidol decanoate (HALDOL DECANOATE) 100 mg/mL injection Comments:   Reason for Stopping:         temazepam (RESTORIL) 30 mg capsule Comments:   Reason for Stopping:               Unresulted Labs (24h ago, onward)    None        To obtain results of studies pending at discharge, please contact [phone]    Follow-up Information     Follow up With Specialties Details [facility] [address]  Call in 1 day As needed for psychiatric appt Dr Cuong Schmidt   [phone]  [address] [address] on 9/14/2021 go at [address] [place] [address] for an intake appt for counseling services [address]  [phone]          Advanced Directive:   Does the patient have an appointed surrogate decision maker? No  Does the patient have a Medical Advance Directive? No  Does the patient have a Psychiatric Advance Directive?  No  If the patient does not have a surrogate or Medical Advance Directive AND Psychiatric Advance Directive, the patient was offered information on these advance directives Patient will complete at a later time    Patient Instructions: Please continue all medications until otherwise directed by physician. Tobacco Cessation Discharge Plan:   Is the patient a smoker and needs referral for smoking cessation? Not applicable  Patient referred to the following for smoking cessation with an appointment? Not applicable     Patient was offered medication to assist with smoking cessation at discharge? Not applicable  Was education for smoking cessation added to the discharge instructions? Not applicable    Alcohol/Substance Abuse Discharge Plan:   Does the patient have a history of substance/alcohol abuse and requires a referral for treatment? Not applicable  Patient referred to the following for substance/alcohol abuse treatment with an appointment? Not applicable  Patient was offered medication to assist with alcohol cessation at discharge? Not applicable  Was education for substance/alcohol abuse added to discharge instructions? Not applicable    Patient discharged to Home; provided to the patient/caregiver either in hard copy or electronically.

## 2021-09-10 NOTE — SUICIDE SAFETY PLAN
SAFETY PLAN    A suicide Safety Plan is a document that supports someone when they are having thoughts of suicide. Warning Signs that indicate a suicidal crisis may be developing: What (situations, thoughts, feelings, body sensations, behaviors, etc.) do you experience that lets you know you are beginning to think about suicide? 1. Increased depression  2. Lack of sleep  3. confusion    Internal Coping Strategies:  What things can I do (relaxation techniques, hobbies, physical activities, etc.) to take my mind off my problems without contacting another person? 1. reading  2. music  3. writing    People and social settings that provide distraction: Who can I call or where can I go to distract me? 1. Name: Angel Christopher  Phone: per pt, number is in phone   2. Name: friend  Phone: per pt, number is in phone    3. Place: park            4. Place: river    People whom I can ask for help: Who can I call when I need help - for example, friends, family, clergy, someone else? 1. Name: Angel Christopher                Phone: per pt, number is in phone   2. Name: friend  Phone: per pt, number is in phone   3. Name: Reji Rainey  Phone: [phone]    Professionals or [facility] [facility] [address] I can contact during a crisis: Who can I call for help - for example, my doctor, my psychiatrist, my psychologist, a mental health provider, a suicide hotline? 1. Clinician Name: [facility]   Phone: [phone]      Clinician Pager or Emergency Contact #: [phone]    7. Clinician Name: Reji Brigid   Phone: [phone]      Clinician Pager or Emergency Contact #: [phone]    7. Suicide Prevention Lifeline: 9-918-503-TALK (9054)    4. [address] [place] [address] Emergency Services -  for example, [facility] suicide hotline, [address] [place] [address] Avenue: [facility]      Emergency Services Address: [facility] [place], [address] [place] [address]      Emergency Services Phone: [phone]    Making the environment safe:  How can I make my environment (house/apartment/living space) safer? For example, can I remove guns, medications, and other items? 1. Take meds as prescribed   2.  Speak with counselor

## 2021-09-10 NOTE — GROUP NOTE
[facility] GROUP DOCUMENTATION INDIVIDUAL                                                                          Group Therapy Note    Date: 9/10/2021    Group Start Time: 0935  Group End Time: 1020  Group Topic: Education Group - Inpatient    SRM 2  NON ACUTE    Maribell Siddiqi    [facility] GROUP DOCUMENTATION GROUP    Group Therapy Note    Facilitated group session focused on introducing information on developing a thought record to help challenge automatic negative thoughts and develop a more accurate view of a situation    Attendees: 8/11         Attendance: Attended    Patient's Goal:  Attend group daily     Interventions/techniques: Validated and Supported    Follows Directions: Followed directions    Interactions: Interacted appropriately    Mental Status: Calm    Behavior/appearance: Cooperative    Goals Achieved: Able to engage in interactions and Able to listen to others      Additional Notes:  Receptive to information discussed on developing a thought record.  Pt acknowledged prior to admission he was feeling \"depressed\"    Carolina Valderrama, [address]

## 2021-09-10 NOTE — PROGRESS NOTES
Problem: Falls - Risk of  Goal: *Absence of Falls  Description: Document Hilario Lindsay Fall Risk and appropriate interventions in the flowsheet.   9/10/2021 1116 by Delmi Levine RN  Outcome: Resolved/Met  9/10/2021 1019 by Delmi Levine RN  Outcome: Progressing Towards Goal  Note: Fall Risk Interventions:                                Problem: Patient Education: Go to Patient Education Activity  Goal: Patient/Family Education  9/10/2021 1116 by Delmi Levine RN  Outcome: Resolved/Met  9/10/2021 1019 by Delmi Levine RN  Outcome: Progressing Towards Goal     Problem: Depressed Mood (Adult/Pediatric)  Goal: *STG: Participates in treatment plan  9/10/2021 1116 by Delmi Levine RN  Outcome: Resolved/Met  9/10/2021 1019 by Delmi Levine RN  Outcome: Progressing Towards Goal  Goal: *STG: Participates in 1:1 therapy sessions  9/10/2021 1116 by Delmi Levine RN  Outcome: Resolved/Met  9/10/2021 1019 by Delmi Levine RN  Outcome: Progressing Towards Goal  Goal: *STG: Verbalizes anger, guilt, and other feelings in a constructive manor  9/10/2021 1116 by Delmi Levine RN  Outcome: Resolved/Met  9/10/2021 1019 by Delmi Levine RN  Outcome: Progressing Towards Goal  Goal: *STG: Attends activities and groups  9/10/2021 1116 by Delmi Levine RN  Outcome: Resolved/Met  9/10/2021 1019 by Delmi Levine RN  Outcome: Progressing Towards Goal  Goal: *STG: Demonstrates reduction in symptoms and increase in insight into coping skills/future focused  9/10/2021 1116 by Delmi Levine RN  Outcome: Resolved/Met  9/10/2021 1019 by Delmi Levine RN  Outcome: Progressing Towards Goal  Goal: *STG: Remains safe in hospital  9/10/2021 1116 by Delmi Levine RN  Outcome: Resolved/Met  9/10/2021 1019 by Delmi Levine RN  Outcome: Progressing Towards Goal  Goal: *STG: Complies with medication therapy  9/10/2021 1116 by Delmi Levine RN  Outcome: Resolved/Met  9/10/2021 1019 by Delmi Levine RN  Outcome: Progressing Towards Goal  Goal: *LTG: Returns to previous level of functioning and participates with after care plan  9/10/2021 1116 by Jose Alberto Sunshine RN  Outcome: Resolved/Met  9/10/2021 1019 by Jose Alberto Sunshine RN  Outcome: Progressing Towards Goal  Goal: *LTG: Understands illness and can identify signs of relapse  9/10/2021 1116 by Jose Alberto Sunshine RN  Outcome: Resolved/Met  9/10/2021 1019 by Jose Alberto Sunshine RN  Outcome: Progressing Towards Goal  Goal: Interventions  9/10/2021 1116 by Jose Alberto Sunshine RN  Outcome: Resolved/Met  9/10/2021 1019 by Jose Alberto Sunshine RN  Outcome: Progressing Towards Goal     Problem: Patient Education: Go to Patient Education Activity  Goal: Patient/Family Education  9/10/2021 1116 by Jose Alberto Sunshine RN  Outcome: Resolved/Met  9/10/2021 1019 by Jose Alberto Sunshine RN  Outcome: Progressing Towards Goal     Problem: Anxiety  Goal: *Alleviation of anxiety  9/10/2021 1116 by Jose Alberto Sunshine RN  Outcome: Resolved/Met  9/10/2021 1019 by Jose Alberto Sunshine RN  Outcome: Progressing Towards Goal  Goal: *Alleviation of anxiety (Palliative Care)  9/10/2021 1116 by Jose Alberto Sunshine RN  Outcome: Resolved/Met  9/10/2021 1019 by Jose Alberto Sunshine RN  Outcome: Progressing Towards Goal     Problem: Patient Education: Go to Patient Education Activity  Goal: Patient/Family Education  9/10/2021 1116 by Jose Alberto Sunshine RN  Outcome: Resolved/Met  9/10/2021 1019 by Jose Alberto Sunshine RN  Outcome: Progressing Towards Goal     Problem: Suicide  Goal: *STG: Remains safe in hospital  9/10/2021 1116 by Jose Alberto Sunshine RN  Outcome: Resolved/Met  9/10/2021 1019 by Jose Alberto Sunshine RN  Outcome: Progressing Towards Goal  Goal: *STG: Seeks staff when feelings of self harm or harm towards others arise  9/10/2021 1116 by Jose Alberto Sunshine RN  Outcome: Resolved/Met  9/10/2021 1019 by Jose Alberto Sunshine RN  Outcome: Progressing Towards Goal  Goal: *STG: Attends activities and groups  9/10/2021 1116 by Jose Alberto Sunshine RN  Outcome: Resolved/Met  9/10/2021 1019 by Isra Olivera RN  Outcome: Progressing Towards Goal  Goal: *STG:  Verbalizes alternative ways of dealing with maladaptive feelings/behaviors  9/10/2021 1116 by Isra Olivera RN  Outcome: Resolved/Met  9/10/2021 1019 by Isra Olivera RN  Outcome: Progressing Towards Goal  Goal: *STG/LTG: Complies with medication therapy  9/10/2021 1116 by Isra Olivera RN  Outcome: Resolved/Met  9/10/2021 1019 by Irsa Olivera RN  Outcome: Progressing Towards Goal  Goal: *STG/LTG: No longer expresses self destructive or suicidal thoughts  9/10/2021 1116 by Isra Olivera RN  Outcome: Resolved/Met  9/10/2021 1019 by Isra Olivera RN  Outcome: Progressing Towards Goal  Goal: *LTG:  Identifies available community resources  9/10/2021 1116 by Isra Olivera RN  Outcome: Resolved/Met  9/10/2021 1019 by Isra Olivera RN  Outcome: Progressing Towards Goal  Goal: *LTG:  Develops proactive suicide prevention plan  9/10/2021 1116 by Isra Olivera RN  Outcome: Resolved/Met  9/10/2021 1019 by Isra Olivera RN  Outcome: Progressing Towards Goal  Goal: Interventions  9/10/2021 1116 by Isra Olivera RN  Outcome: Resolved/Met  9/10/2021 1019 by Isra Olivera RN  Outcome: Progressing Towards Goal     Problem: Patient Education: Go to Patient Education Activity  Goal: Patient/Family Education  9/10/2021 1116 by Isra Olivera RN  Outcome: Resolved/Met  9/10/2021 1019 by Isra Olivera RN  Outcome: Progressing Towards Goal

## 2021-09-10 NOTE — PROGRESS NOTES
Patient discharged in care of brother. Patient denies si/hi/avh. Patient received all belongings. Patient and brother verbalized understanding af all discharge instructions including follow up appointments.

## 2021-09-10 NOTE — DISCHARGE SUMMARY
M[facility] SUMMARY    Name:  QUANG NARVAEZ  MR#:  [id number]  :  1965  ACCOUNT #:  [de-identified]  ADMIT DATE:  2021  DISCHARGE DATE:  09/10/2021    Please make reference to Dr. Joshua Mrainelli admission psychiatric H and P, initial psychiatric evaluation. This is a 25-year-old male patient admitted to [address] [place] [address] Unit from [facility] ED where he was brought by brother. CHIEF COMPLAINT:  Decompensated, hearing voices, banging his head, aggressive, mood instability, needs medication adjustment. HISTORY OF PRESENT ILLNESS:  This patient with a long history of chronic mental illness and schizoaffective disorder for 30 plus years. Apparently, he started decompensating, depressed, agitated, aggressive, mood swings, banging his head on the chest drawer and hearing voices, agitated, decompensated. Apparently, there was a decrease in lithium from a total of 900 mg to 600 mg, the behavior attributed to that, and he was supposed to be taking lithium 300 mg in the morning and 600 at night and Risperdal 3 mg twice a day, Prozac 40 mg daily, and Haldol Decanoate, reportedly due on 2021, which we found out later. PAST MEDICAL HISTORY:  Apparently had one admission here, one admission at [facility], and other admissions. ALLERGIES:  NO KNOWN ALLERGIES TO MEDICATIONS. SUBSTANCE ABUSE:  Unremarkable. PHYSICAL EXAMINATION:  VITAL SIGNS:  On the day of discharge, temperature is 98.2, pulse 70, blood pressure is 129/84, and respirations 18. LABORATORY DATA:  Drug screen negative. CBC unremarkable. Metabolic panel; potassium 3.4, glucose 107. Liver functions are normal.  Ethyl alcohol less than 4, magnesi. The potassium normalized at 3.6, and otherwise unremarkable.     HOSPITAL COURSE:  Dr. Brenda Hankins saw the patient and started him on medications including increased higher dose of lithium 300 mg in the morning and 600 at night, Cogentin 1 mg b.i.d., bupropion 150 mg at bedtime, Prozac 40 mg daily, lithium carbonate 300 in the morning and 600 at night, risperidone 3 mg twice a day, and trazodone 50 mg p.r.n. He was also placed on hydroxyzine p.r.n., not needing it, and he got the medication, he was stabilized, and  spoke with the brother, felt ready to come home, he will  at 12 o'clock and follow up with the regular psychiatrist, and during the stay, the patient remained cooperative with medications, taking medications, polite, not made any attempt to harm himself or others. No more voices, not expressing any of the self-destructive behaviors, verbally or physically. No more conversation about talking to  mother. CONDITION ON DISCHARGE:  Discharged as improved, schizoaffective disorder. DISCHARGE MEDICATIONS:  Sent to [facility] of the record. Cogentin 1 mg b.i.d., bupropion 150 mg XL at night, Prozac 20 mg at bedtime and 40 mg daily, lithium 300 mg in the morning and 600 mg at night, and Risperdal 3 mg twice a day. He received Haldol Decanoate 100 mg on , next one is due on 10/06/2021.       Jack Billy MD      [id number]  D:  09/10/2021 11:28  T:  09/10/2021 13:20  JOB #:  [id number]

## 2021-09-10 NOTE — BH NOTES
Patient came to the nurses station and asks for new linens and gowns because he urinated on himself and his bed sheets were wet. Patient was given new linen, gown, and towels.

## 2021-09-21 ENCOUNTER — CLINICAL SUPPORT (OUTPATIENT)
Dept: FAMILY MEDICINE CLINIC | Age: 56
End: 2021-09-21
Payer: MEDICARE

## 2021-09-21 VITALS
DIASTOLIC BLOOD PRESSURE: 68 MMHG | SYSTOLIC BLOOD PRESSURE: 109 MMHG | OXYGEN SATURATION: 98 % | HEART RATE: 76 BPM | TEMPERATURE: 98.9 F | BODY MASS INDEX: 24.32 KG/M2 | HEIGHT: 65 IN | WEIGHT: 146 LBS | RESPIRATION RATE: 18 BRPM

## 2021-09-21 DIAGNOSIS — F20.9 SCHIZOPHRENIA, UNSPECIFIED TYPE (HCC): Primary | ICD-10-CM

## 2021-09-21 PROCEDURE — 96372 THER/PROPH/DIAG INJ SC/IM: CPT | Performed by: FAMILY MEDICINE

## 2021-09-21 RX ORDER — HALOPERIDOL DECANOATE 100 MG/ML
100 INJECTION INTRAMUSCULAR
Status: DISCONTINUED | OUTPATIENT
Start: 2021-09-21 | End: 2021-11-08 | Stop reason: CLARIF

## 2021-09-21 RX ADMIN — HALOPERIDOL DECANOATE 100 MG: 100 INJECTION INTRAMUSCULAR at 13:46

## 2021-10-05 ENCOUNTER — CLINICAL SUPPORT (OUTPATIENT)
Dept: FAMILY MEDICINE CLINIC | Age: 56
End: 2021-10-05
Payer: MEDICARE

## 2021-10-05 VITALS
WEIGHT: 146 LBS | DIASTOLIC BLOOD PRESSURE: 65 MMHG | SYSTOLIC BLOOD PRESSURE: 110 MMHG | TEMPERATURE: 98 F | BODY MASS INDEX: 24.32 KG/M2 | HEART RATE: 78 BPM | HEIGHT: 65 IN | OXYGEN SATURATION: 98 %

## 2021-10-05 DIAGNOSIS — F20.9 SCHIZOPHRENIA, UNSPECIFIED TYPE (HCC): Primary | ICD-10-CM

## 2021-10-05 PROCEDURE — 96372 THER/PROPH/DIAG INJ SC/IM: CPT | Performed by: FAMILY MEDICINE

## 2021-10-05 RX ORDER — HALOPERIDOL DECANOATE 100 MG/ML
100 INJECTION INTRAMUSCULAR ONCE
Status: COMPLETED | OUTPATIENT
Start: 2021-10-05 | End: 2021-10-05

## 2021-10-05 RX ADMIN — HALOPERIDOL DECANOATE 100 MG: 100 INJECTION INTRAMUSCULAR at 15:29

## 2021-10-19 ENCOUNTER — OFFICE VISIT (OUTPATIENT)
Dept: FAMILY MEDICINE CLINIC | Age: 56
End: 2021-10-19
Payer: MEDICARE

## 2021-10-19 VITALS
TEMPERATURE: 97.8 F | HEIGHT: 65 IN | SYSTOLIC BLOOD PRESSURE: 125 MMHG | HEART RATE: 76 BPM | DIASTOLIC BLOOD PRESSURE: 73 MMHG | WEIGHT: 144 LBS | BODY MASS INDEX: 23.99 KG/M2 | OXYGEN SATURATION: 97 %

## 2021-10-19 DIAGNOSIS — M79.89 LEG SWELLING: ICD-10-CM

## 2021-10-19 DIAGNOSIS — R25.1 TREMOR: ICD-10-CM

## 2021-10-19 DIAGNOSIS — F20.9 SCHIZOPHRENIA, UNSPECIFIED TYPE (HCC): Primary | ICD-10-CM

## 2021-10-19 DIAGNOSIS — R41.3 MEMORY IMPAIRMENT: ICD-10-CM

## 2021-10-19 PROCEDURE — G9717 DOC PT DX DEP/BP F/U NT REQ: HCPCS | Performed by: FAMILY MEDICINE

## 2021-10-19 PROCEDURE — G8427 DOCREV CUR MEDS BY ELIG CLIN: HCPCS | Performed by: FAMILY MEDICINE

## 2021-10-19 PROCEDURE — 99213 OFFICE O/P EST LOW 20 MIN: CPT | Performed by: FAMILY MEDICINE

## 2021-10-19 PROCEDURE — G8420 CALC BMI NORM PARAMETERS: HCPCS | Performed by: FAMILY MEDICINE

## 2021-10-19 PROCEDURE — 96372 THER/PROPH/DIAG INJ SC/IM: CPT | Performed by: FAMILY MEDICINE

## 2021-10-19 PROCEDURE — 3017F COLORECTAL CA SCREEN DOC REV: CPT | Performed by: FAMILY MEDICINE

## 2021-10-19 RX ORDER — HALOPERIDOL 5 MG/1
TABLET ORAL
COMMUNITY
Start: 2021-09-27 | End: 2022-05-17 | Stop reason: DRUGHIGH

## 2021-10-19 RX ORDER — HALOPERIDOL DECANOATE 100 MG/ML
100 INJECTION INTRAMUSCULAR ONCE
Status: COMPLETED | OUTPATIENT
Start: 2021-10-19 | End: 2021-10-19

## 2021-10-19 RX ADMIN — HALOPERIDOL DECANOATE 100 MG: 100 INJECTION INTRAMUSCULAR at 15:30

## 2021-10-19 NOTE — PROGRESS NOTES
1. Have you been to the ER, urgent care clinic since your last visit? Hospitalized since your last visit? Seen at [facility] on 9/4/21    2. Have you seen or consulted any other health care providers outside of the [address] [place] [address] since your last visit? Include any pap smears or colon screening.  No    Chief Complaint   Patient presents with    Injection    Foot Swelling     brother states that pt has swelling in bilateral feet     Visit Vitals  /73 (BP 1 Location: Right upper arm, BP Patient Position: Sitting)   Pulse 76   Temp 97.8 °F (36.6 °C) (Temporal)   Ht 5' 5\" (1.651 m)   Wt 144 lb (65.3 kg)   SpO2 97%   BMI 23.96 kg/m²

## 2021-10-19 NOTE — PATIENT INSTRUCTIONS
General Health and concerns:  HEART HEALTHY DIET:  A heart healthy diet is one that is low in cholesterol (less than 300 mg daily), fat (less than 80 g daily) . You should also minimize carbohydrates / sugars (less amounts of breads, pastas, potato and potato products and sugary foods/snacks, cookies, cakes, etc) . Try to eat whole wheat/multigrain breads and pastas and eat more vegetables. Cook with olive oil (or no oil) and grill, bake, broil or boil foods. Less red meat and more chicken , fish and lean cuts of beef (limited). 6679-1689 calories per day is sufficient 5766-9935 is acceptable for weight loss. EXERCISE:  You should do exercise 3-5 days per week (minimum) to include increasing your heart rate for 30 to 45 minutes. At least a pace of a brisk walk should do that. This build up your heart and lung endurance and muscles and helps many function of the body. OTHER:    IF your condition(s) do not improve, get worse and/or if any concerns arise, please call or come by the office. Routine Health maintenance: You need to get a yearly follow up/physical exam to review, discuss age and gender appropriate exams, labs, vaccines and screening tests. This includes cardiovascular health risk, cancer screens and other clive related topics. Medications-Take all medications as directed. Please do not stop unless you talk to your doctor or health care provider first. Report any problems immediately. PLEASE CHECK THE LIST FROM TODAY's VISIT and make SURE IT IS ACCURATE-Please bring any issues to our concern IMMEDIATELY!! Referrals: if you have been given a referral, please call the office if you do not hear from provider in one week. You may make the appointment yourself. Please keep all appointments with specialists and ask them to send their notes, thoughts, recommendations to us , as your PCP.     KEEP all upcoming appointments with our office UNLESS otherwise and specifically told not to. CHECK your diagnosis/problem list for today and that orders and prescriptions are what we discussed as well as MAKE sure all information is accurate and has been discussed to your satisfaction. PLEASE make sure all your questions have been answered and feel free to call or come back should any concerns arise. Imaging/Labs:  Be sure to get these images in a timely manner. IF your test must be scheduled, let us know if you need help getting this done and if you do not hear from that provider in a week , call us or them. BE SURE to call the office if you do not hear regarding the results in one week after the test is performed Image or lab). It is our intention to inform you of the results ALWAYS, even if normal you should get a notification (Call, portal message). PLEASE wilfrido if you do not get the results. PLEASE follow all recommendations and call/come in /ask questions if you do not understand of if problems develop after or in between visits. Failure to comply with recommended health care advise could result in serious health consequences. Thank you for choosing our practice and please let us know how we can help you feel better and stay well!

## 2021-10-19 NOTE — PROGRESS NOTES
IDENTIFYING INFORMATION:      Shirley Christopher , 60 y.o., male  [address] [facility],  [address] [address]     Medical Record Number: [id number]          CHIEF COMPLAINT:     Chief Complaint   Patient presents with    Injection    Foot Swelling     brother states that pt has swelling in bilateral feet         HISTORY OF PRESENT ILLNESS:    [de-identified] T Candi is a 60 y.o. male  has a past medical history of Anxiety, Depression, Intellectual disability, Schizoaffective disorder, bipolar type ([address] and Schizophrenia ([address] [facility] .  he comes in for follow up and is accompanied by his brother. Complains of feet swelling mostly in the afternoon. NO chest pain ,short of breath, leg pain. NO palpitations, nausea, vomiting, diarrhea, constipatin. NO fevers, chills, sweats,   No abdominal pain. No couigh, congestion, sore throat, hoarseness. Labs done a month ago erviewed. Normal renal and liver function. PAST MEDICAL HISTORY:     Past Medical History:   Diagnosis Date    Anxiety     Depression     Intellectual disability     Schizoaffective disorder, bipolar type ([facility]     Schizophrenia ([facility]        MEDICATIONS:     Current Outpatient Medications on File Prior to Visit   Medication Sig Dispense Refill    haloperidoL (HALDOL) 5 mg tablet       buPROPion XL (WELLBUTRIN XL) 150 mg tablet Take 1 Tablet by mouth nightly. 30 Tablet 0    FLUoxetine (PROzac) 40 mg capsule Take 1 Capsule by mouth daily. 30 Capsule 0    lithium carbonate 300 mg tablet Take 1 Tablet by mouth Every morning. 30 Tablet 0    risperiDONE (RisperDAL) 3 mg tablet Take 1 Tablet by mouth two (2) times a day. 60 Tablet 0    traZODone (DESYREL) 50 mg tablet Take 1 Tablet by mouth nightly as needed for Sleep (For insomnia). 30 Tablet 0    benztropine (COGENTIN) 1 mg tablet Take 1 Tablet by mouth two (2) times a day. 60 Tablet 0    FLUoxetine (PROzac) 20 mg capsule Take 1 Capsule by mouth nightly.  30 Capsule 0    [DISCONTINUED] lithium carbonate 300 mg tablet Take 2 Tablets by mouth nightly. 30 Tablet 0     Current Facility-Administered Medications on File Prior to Visit   Medication Dose Route Frequency Provider Last Rate Last Admin    haloperidol decanoate (HALDOL DECANOATE) 100 mg/mL LA injection 100 mg  100 mg IntraMUSCular Q28D Awacristobal Reyna DO   100 mg at 09/21/21 1346       ALLERGIES:    No Known Allergies      SOCIAL HISTORY:     Social History     Tobacco Use    Smoking status: Never Smoker    Smokeless tobacco: Never Used   Vaping Use    Vaping Use: Never used   Substance Use Topics    Alcohol use: Never    Drug use: Never       SURGICAL HISTORY:    History reviewed. No pertinent surgical history. FAMILY HISTORY:    Family History   Problem Relation Age of Onset    Cancer Father         bone    Pancreatic Cancer Father     Dementia Mother          REVIEW OF SYSTEMS:    I personally collected this information from all available source present (patient/others in room and records available) -JLEWISDO    SEE History of the PRESENT ILLNESS      PHYSICAL EXAMINATION:    Vital Signs:    Visit Vitals  /73 (BP 1 Location: Right upper arm, BP Patient Position: Sitting)   Pulse 76   Temp 97.8 °F (36.6 °C) (Temporal)   Ht 5' 5\" (1.651 m)   Wt 144 lb (65.3 kg)   SpO2 97%   BMI 23.96 kg/m²         Wt Readings from Last 3 Encounters:   10/19/21 144 lb (65.3 kg)   10/05/21 146 lb (66.2 kg)   09/21/21 146 lb (66.2 kg)     BP Readings from Last 3 Encounters:   10/19/21 125/73   10/05/21 110/65   09/21/21 109/68         Physical Exam  Constitutional:       Appearance: He is not ill-appearing or toxic-appearing. Cardiovascular:      Rate and Rhythm: Normal rate and regular rhythm. Heart sounds: No murmur heard. No friction rub. No gallop. Pulmonary:      Effort: Pulmonary effort is normal.      Breath sounds: Normal breath sounds. No wheezing, rhonchi or rales. Abdominal:      Palpations: Abdomen is soft. Tenderness:  There is no abdominal tenderness. There is no guarding. Musculoskeletal:         General: No deformity. Cervical back: No tenderness. Right lower leg: Edema present. Left lower leg: Edema present. Comments: Trace edema bilaterally   Lymphadenopathy:      Cervical: No cervical adenopathy. Skin:     Coloration: Skin is not jaundiced. Findings: No erythema or rash. Neurological:      General: No focal deficit present. Mental Status: He is alert and oriented to person, place, and time. Mental status is at baseline. Psychiatric:         Mood and Affect: Mood normal.         Behavior: Behavior normal.         Thought Content: Thought content normal.         Judgment: Judgment normal.           ASSESSMENT/PLAN:       Brother thinks he should have a fluid pill.  However, they will certainly interfere with his lithium which is not advisable   I recommended stockings and keeping feet elevated   Lab work reviewed will need some next visit   Continue his haloperidol injections every 2 weeks   No adverse sequelae noted no tardive dyskinesia    ICD-10-CM ICD-9-CM    1. Schizophrenia, unspecified type (HCC)  F20.9 295.90 haloperidol decanoate (HALDOL DECANOATE) 100 mg/mL LA injection 100 mg   2. Leg swelling  M79.89 729.81    3. Tremor  R25.1 781.0    4. Memory impairment  R41.3 780.93      Discussion (regarding today's visit with Damián Christopher);   WE reviewed medications, treatment, testing such as labs, imagine, referrals and when to call regarding results and appointments.  Reminded patient to keep any and all appointments with specialists, labs, imaging.  Reminded patient to make sure we get copies of any specialists care, labs and imaging.  Reminded patient to call of come by the office if there are any concerns, questions , comments or problems.  The patient verbalized understanding of the care plan and all questions were answered to the patient's satisfaction prior to leaving the office.  The patient was told that failure to comply with recommended testing could result in abnormal health consequences.  The patient was instructed to have yearly routine health maintenance including but not limited to age appropriate vaccines, testing, screening exams.  ALL questions were answered to his satisfaction before leaving the office. The patient actively participated in medical decision making. FOLLOW UP:     Patient knows to keep any and all future visits scheduled unless told otherwise. Patient knows to call, come back if any concerns, questions, comments or problems arise. Follow-up and Dispositions    · Return in about 4 weeks (around 11/16/2021) for follow up edema. Irwin June, DO    This visit was reviewed and signed electronically. It was been completed with voice recognition software and hand typing. It may have syntax and spelling errors despite editing.

## 2021-11-16 ENCOUNTER — CLINICAL SUPPORT (OUTPATIENT)
Dept: FAMILY MEDICINE CLINIC | Age: 56
End: 2021-11-16
Payer: MEDICARE

## 2021-11-16 VITALS
SYSTOLIC BLOOD PRESSURE: 118 MMHG | WEIGHT: 142 LBS | DIASTOLIC BLOOD PRESSURE: 73 MMHG | HEIGHT: 65 IN | BODY MASS INDEX: 23.66 KG/M2 | OXYGEN SATURATION: 95 % | TEMPERATURE: 98.2 F | HEART RATE: 73 BPM

## 2021-11-16 DIAGNOSIS — F20.9 SCHIZOPHRENIA, UNSPECIFIED TYPE (HCC): Primary | ICD-10-CM

## 2021-11-16 PROCEDURE — 96372 THER/PROPH/DIAG INJ SC/IM: CPT | Performed by: FAMILY MEDICINE

## 2021-11-16 RX ORDER — HALOPERIDOL DECANOATE 100 MG/ML
100 INJECTION INTRAMUSCULAR ONCE
Status: COMPLETED | OUTPATIENT
Start: 2021-11-16 | End: 2021-11-16

## 2021-11-16 RX ADMIN — HALOPERIDOL DECANOATE 100 MG: 100 INJECTION INTRAMUSCULAR at 03:15

## 2021-11-18 ENCOUNTER — OFFICE VISIT (OUTPATIENT)
Dept: FAMILY MEDICINE CLINIC | Age: 56
End: 2021-11-18
Payer: MEDICARE

## 2021-11-18 VITALS
SYSTOLIC BLOOD PRESSURE: 94 MMHG | DIASTOLIC BLOOD PRESSURE: 52 MMHG | OXYGEN SATURATION: 96 % | HEIGHT: 65 IN | HEART RATE: 109 BPM | WEIGHT: 147.2 LBS | BODY MASS INDEX: 24.53 KG/M2 | TEMPERATURE: 98.6 F

## 2021-11-18 DIAGNOSIS — R25.1 TREMOR: ICD-10-CM

## 2021-11-18 DIAGNOSIS — R26.9 GAIT DISTURBANCE: ICD-10-CM

## 2021-11-18 DIAGNOSIS — F20.9 SCHIZOPHRENIA, UNSPECIFIED TYPE (HCC): Primary | ICD-10-CM

## 2021-11-18 DIAGNOSIS — M79.89 LEG SWELLING: ICD-10-CM

## 2021-11-18 PROCEDURE — G8427 DOCREV CUR MEDS BY ELIG CLIN: HCPCS | Performed by: FAMILY MEDICINE

## 2021-11-18 PROCEDURE — G8420 CALC BMI NORM PARAMETERS: HCPCS | Performed by: FAMILY MEDICINE

## 2021-11-18 PROCEDURE — 3017F COLORECTAL CA SCREEN DOC REV: CPT | Performed by: FAMILY MEDICINE

## 2021-11-18 PROCEDURE — 99212 OFFICE O/P EST SF 10 MIN: CPT | Performed by: FAMILY MEDICINE

## 2021-11-18 PROCEDURE — G9717 DOC PT DX DEP/BP F/U NT REQ: HCPCS | Performed by: FAMILY MEDICINE

## 2021-11-18 NOTE — PATIENT INSTRUCTIONS
General Health and concerns:  HEART HEALTHY DIET:  A heart healthy diet is one that is low in cholesterol (less than 300 mg daily), fat (less than 80 g daily) . You should also minimize carbohydrates / sugars (less amounts of breads, pastas, potato and potato products and sugary foods/snacks, cookies, cakes, etc) . Try to eat whole wheat/multigrain breads and pastas and eat more vegetables. Cook with olive oil (or no oil) and grill, bake, broil or boil foods. Less red meat and more chicken , fish and lean cuts of beef (limited). 4403-4421 calories per day is sufficient 9450-4159 is acceptable for weight loss. EXERCISE:  You should do exercise 3-5 days per week (minimum) to include increasing your heart rate for 30 to 45 minutes. At least a pace of a brisk walk should do that. This build up your heart and lung endurance and muscles and helps many function of the body. OTHER:    IF your condition(s) do not improve, get worse and/or if any concerns arise, please call or come by the office. Routine Health maintenance: You need to get a yearly follow up/physical exam to review, discuss age and gender appropriate exams, labs, vaccines and screening tests. This includes cardiovascular health risk, cancer screens and other clive related topics. Medications-Take all medications as directed. Please do not stop unless you talk to your doctor or health care provider first. Report any problems immediately. PLEASE CHECK THE LIST FROM TODAY's VISIT and make SURE IT IS ACCURATE-Please bring any issues to our concern IMMEDIATELY!! Referrals: if you have been given a referral, please call the office if you do not hear from provider in one week. You may make the appointment yourself. Please keep all appointments with specialists and ask them to send their notes, thoughts, recommendations to us , as your PCP.     KEEP all upcoming appointments with our office UNLESS otherwise and specifically told not to. CHECK your diagnosis/problem list for today and that orders and prescriptions are what we discussed as well as MAKE sure all information is accurate and has been discussed to your satisfaction. PLEASE make sure all your questions have been answered and feel free to call or come back should any concerns arise. Imaging/Labs:  Be sure to get these images in a timely manner. IF your test must be scheduled, let us know if you need help getting this done and if you do not hear from that provider in a week , call us or them. BE SURE to call the office if you do not hear regarding the results in one week after the test is performed Image or lab). It is our intention to inform you of the results ALWAYS, even if normal you should get a notification (Call, portal message). PLEASE wilfrido if you do not get the results. PLEASE follow all recommendations and call/come in /ask questions if you do not understand of if problems develop after or in between visits. Failure to comply with recommended health care advise could result in serious health consequences. Thank you for choosing our practice and please let us know how we can help you feel better and stay well!

## 2021-11-18 NOTE — PROGRESS NOTES
IDENTIFYING INFORMATION:      Shirley Christopher , 60 y.o., male  [address] [facility],  [address] [address]     Medical Record Number: [id number]    E and M CODING:  Established      Patient Active Problem List   Diagnosis Code    Schizophrenia ([facility] F20.9    Tremor R25.1    Type 2 diabetes mellitus without complication ([facility] V04.8    Memory impairment R41.3    Gait disturbance R26.9    Depression with suicidal ideation F32. A, R45.851           CHIEF COMPLAINT:   Chief Complaint   Patient presents with    Peripheral Edema    Follow-up         HISTORY OF PRESENT ILLNESS:    Shirley Christopher is a 60 y.o. male . he comes in for Follow up on edema. Accompanied by brother Says he is shaking, had some lithium issues and that is doing better with shaking AND edema on 750 mg dose. Sees Psychiatry also but comes here to get Haloperidol injection  NO complications noted. Has no edema and brother noted trying to eat less salt foods. PAST MEDICAL HISTORY:     Past Medical History:   Diagnosis Date    Anxiety     Depression     Intellectual disability     Schizoaffective disorder, bipolar type ([facility]     Schizophrenia ([facility]        MEDICATIONS:     Current Outpatient Medications on File Prior to Visit   Medication Sig Dispense Refill    haloperidoL (HALDOL) 5 mg tablet       buPROPion XL (WELLBUTRIN XL) 150 mg tablet Take 1 Tablet by mouth nightly. 30 Tablet 0    FLUoxetine (PROzac) 40 mg capsule Take 1 Capsule by mouth daily. 30 Capsule 0    lithium carbonate 300 mg tablet Take 1 Tablet by mouth Every morning. (Patient taking differently: Take 300 mg by mouth Every morning. Currently taking 750 mg) 30 Tablet 0    risperiDONE (RisperDAL) 3 mg tablet Take 1 Tablet by mouth two (2) times a day. 60 Tablet 0    traZODone (DESYREL) 50 mg tablet Take 1 Tablet by mouth nightly as needed for Sleep (For insomnia). 30 Tablet 0    benztropine (COGENTIN) 1 mg tablet Take 1 Tablet by mouth two (2) times a day.  60 Tablet 0    FLUoxetine (PROzac) 20 mg capsule Take 1 Capsule by mouth nightly. 30 Capsule 0     Current Facility-Administered Medications on File Prior to Visit   Medication Dose Route Frequency Provider Last Rate Last Admin    haloperidol decanoate (HALDOL DECANOATE) 100 mg/mL LA injection 100 mg  100 mg IntraMUSCular Q28D Hilda Cardozo CMA   100 mg at 11/02/21 1604       ALLERGIES:    No Known Allergies      SOCIAL HISTORY:     Social History     Socioeconomic History    Marital status: SINGLE   Tobacco Use    Smoking status: Never Smoker    Smokeless tobacco: Never Used   Vaping Use    Vaping Use: Never used   Substance and Sexual Activity    Alcohol use: Never    Drug use: Never   Social History Narrative    ** Merged History Encounter **         Lives at home with father, needs supervised care. SURGICAL HISTORY:    History reviewed. No pertinent surgical history. FAMILY HISTORY:    Family History   Problem Relation Age of Onset    Cancer Father         bone    Pancreatic Cancer Father     Dementia Mother          REVIEW OF SYSTEMS:    I personally collected this information from all available source present (patient/others in room and records available) -JLEWISDO  Review of Systems   Constitutional: Negative for chills, diaphoresis and fever. Respiratory: Negative for cough and shortness of breath. Cardiovascular: Negative for chest pain, palpitations and leg swelling (this is better ). Gastrointestinal: Negative for abdominal pain, constipation, diarrhea, nausea and vomiting. Genitourinary: Negative for dysuria, frequency and urgency. Musculoskeletal: Negative for joint pain, myalgias and neck pain. Neurological: Positive for tremors (shakiness sometimes, brother thinks it is too much lithium, sees psych for that adjustment. ). Negative for dizziness, tingling, sensory change and headaches.          PHYSICAL EXAMINATION:    Vital Signs:    Visit Vitals  BP (!) 94/52 (BP 1 Location: Right upper arm, BP Patient Position: Sitting)   Pulse (!) 109   Temp 98.6 °F (37 °C) (Temporal)   Ht 5' 5\" (1.651 m)   Wt 147 lb 3.2 oz (66.8 kg)   SpO2 96%   BMI 24.50 kg/m²         Wt Readings from Last 3 Encounters:   11/18/21 147 lb 3.2 oz (66.8 kg)   11/16/21 142 lb (64.4 kg)   11/02/21 144 lb (65.3 kg)     BP Readings from Last 3 Encounters:   11/18/21 (!) 94/52   11/16/21 118/73   11/02/21 135/89         Physical Exam  Constitutional:       Appearance: He is not ill-appearing or toxic-appearing. Eyes:      General: No scleral icterus. Extraocular Movements: Extraocular movements intact. Conjunctiva/sclera: Conjunctivae normal.   Cardiovascular:      Rate and Rhythm: Normal rate and regular rhythm. Heart sounds: No murmur heard. No friction rub. No gallop. Pulmonary:      Effort: Pulmonary effort is normal.      Breath sounds: Normal breath sounds. No wheezing, rhonchi or rales. Abdominal:      Palpations: Abdomen is soft. Tenderness: There is no abdominal tenderness. There is no guarding. Musculoskeletal:         General: No deformity. Cervical back: No tenderness. Right lower leg: No edema. Left lower leg: No edema. Lymphadenopathy:      Cervical: No cervical adenopathy. Skin:     Coloration: Skin is not jaundiced. Findings: No erythema or rash. Neurological:      General: No focal deficit present. Mental Status: He is alert and oriented to person, place, and time. Mental status is at baseline. Psychiatric:         Mood and Affect: Mood normal.         Behavior: Behavior normal.         Thought Content: Thought content normal.         Judgment: Judgment normal.           ASSESSMENT/PLAN:       Continue medications,   Labs as indicated   Continue psychiatric care   Follow up three months   Continue haloperidol shots    ICD-10-CM ICD-9-CM    1. Schizophrenia, unspecified type ([facility]  F20.9 295.90    2. Leg swelling  M79.89 729.81    3.  Tremor  R25.1 781.0    4. Gait disturbance  R26.9 781.2      Discussion (regarding today's visit with Cecille [facility]andez [facility]);   WE gave the patient a review of medications, treatment, testing such as labs, imagine, referrals and when to call regarding results and appointments.  Reminded patient to keep any and all appointments with specialists, labs, imaging.  Reminded patient to make sure we get copies of any specialists care, labs and imaging.  Reminded patient to call of come by the office if there are any concerns, questions , comments or problems.  The patient verbalized understanding of the care plan and all questions were answered to the patient's satisfaction prior to leaving the office.  The patient was told that failure to comply with recommended testing could result in abnormal health consequences.  The patient was instructed to have yearly routine health maintenance including but not limited to age appropriate vaccines, testing, screening exams.  ALL questions were answered to his satisfaction before leaving the office. The patient actively participated in medical decision making. This date I spent  10 minutes reviewing chart, previous notes, tests and interviewing and examining patients answering questions providing follow up as well as ordering tests. FOLLOW UP:     Patient knows to keep any and all future visits scheduled unless told otherwise. Patient knows to call, come back if any concerns, questions, comments or problems arise. Follow-up and Dispositions    · Return in about 3 months (around 2/18/2022) for Edema, lab. Signed By: Ulises Liu DO     November 18, 2021     TIME: 5: 37 pm    This visit was reviewed and signed electronically. It was been completed with voice recognition software and hand typing. It may have syntax and spelling errors despite editing.

## 2021-11-18 NOTE — PROGRESS NOTES
1. Have you been to the ER, urgent care clinic since your last visit? Hospitalized since your last visit? No    2. Have you seen or consulted any other health care providers outside of the [address] [place] [address] since your last visit? Include any pap smears or colon screening.  No    Chief Complaint   Patient presents with    Peripheral Edema    Follow-up     Visit Vitals  BP (!) 94/52 (BP 1 Location: Right upper arm, BP Patient Position: Sitting)   Pulse (!) 109   Temp 98.6 °F (37 °C) (Temporal)   Ht 5' 5\" (1.651 m)   Wt 147 lb 3.2 oz (66.8 kg)   SpO2 96%   BMI 24.50 kg/m²

## 2021-11-30 ENCOUNTER — CLINICAL SUPPORT (OUTPATIENT)
Dept: FAMILY MEDICINE CLINIC | Age: 56
End: 2021-11-30
Payer: MEDICARE

## 2021-11-30 DIAGNOSIS — F20.9 SCHIZOPHRENIA, UNSPECIFIED TYPE (HCC): Primary | ICD-10-CM

## 2021-11-30 PROCEDURE — 96372 THER/PROPH/DIAG INJ SC/IM: CPT | Performed by: NURSE PRACTITIONER

## 2021-11-30 RX ADMIN — HALOPERIDOL DECANOATE 100 MG: 100 INJECTION INTRAMUSCULAR at 15:04

## 2021-12-14 ENCOUNTER — CLINICAL SUPPORT (OUTPATIENT)
Dept: FAMILY MEDICINE CLINIC | Age: 56
End: 2021-12-14
Payer: MEDICARE

## 2021-12-14 DIAGNOSIS — F20.9 SCHIZOPHRENIA, UNSPECIFIED TYPE (HCC): Primary | ICD-10-CM

## 2021-12-14 PROCEDURE — 96372 THER/PROPH/DIAG INJ SC/IM: CPT | Performed by: FAMILY MEDICINE

## 2021-12-14 RX ORDER — HALOPERIDOL DECANOATE 100 MG/ML
100 INJECTION INTRAMUSCULAR ONCE
Status: COMPLETED | OUTPATIENT
Start: 2021-12-14 | End: 2021-12-14

## 2021-12-14 RX ADMIN — HALOPERIDOL DECANOATE 100 MG: 100 INJECTION INTRAMUSCULAR at 15:04

## 2021-12-28 ENCOUNTER — CLINICAL SUPPORT (OUTPATIENT)
Dept: FAMILY MEDICINE CLINIC | Age: 56
End: 2021-12-28
Payer: MEDICARE

## 2021-12-28 DIAGNOSIS — F20.9 SCHIZOPHRENIA, UNSPECIFIED TYPE (HCC): Primary | ICD-10-CM

## 2021-12-28 PROCEDURE — 96372 THER/PROPH/DIAG INJ SC/IM: CPT | Performed by: NURSE PRACTITIONER

## 2021-12-28 RX ADMIN — HALOPERIDOL DECANOATE 100 MG: 100 INJECTION INTRAMUSCULAR at 14:59

## 2022-01-11 ENCOUNTER — CLINICAL SUPPORT (OUTPATIENT)
Dept: FAMILY MEDICINE CLINIC | Age: 57
End: 2022-01-11
Payer: MEDICARE

## 2022-01-11 VITALS
HEIGHT: 65 IN | TEMPERATURE: 98.7 F | WEIGHT: 145 LBS | BODY MASS INDEX: 24.16 KG/M2 | HEART RATE: 75 BPM | RESPIRATION RATE: 18 BRPM | SYSTOLIC BLOOD PRESSURE: 130 MMHG | OXYGEN SATURATION: 98 % | DIASTOLIC BLOOD PRESSURE: 87 MMHG

## 2022-01-11 DIAGNOSIS — F20.89 OTHER SCHIZOPHRENIA (HCC): Primary | ICD-10-CM

## 2022-01-11 PROCEDURE — 96372 THER/PROPH/DIAG INJ SC/IM: CPT | Performed by: NURSE PRACTITIONER

## 2022-01-11 RX ORDER — HALOPERIDOL DECANOATE 100 MG/ML
100 INJECTION INTRAMUSCULAR
Status: DISCONTINUED | OUTPATIENT
Start: 2022-01-11 | End: 2022-05-12

## 2022-01-11 RX ADMIN — HALOPERIDOL DECANOATE 100 MG: 100 INJECTION INTRAMUSCULAR at 15:36

## 2022-01-25 ENCOUNTER — CLINICAL SUPPORT (OUTPATIENT)
Dept: FAMILY MEDICINE CLINIC | Age: 57
End: 2022-01-25
Payer: MEDICARE

## 2022-01-25 DIAGNOSIS — F20.89 OTHER SCHIZOPHRENIA (HCC): Primary | ICD-10-CM

## 2022-01-25 PROCEDURE — 96372 THER/PROPH/DIAG INJ SC/IM: CPT | Performed by: NURSE PRACTITIONER

## 2022-01-25 RX ADMIN — HALOPERIDOL DECANOATE 100 MG: 100 INJECTION INTRAMUSCULAR at 15:06

## 2022-01-26 ENCOUNTER — TELEPHONE (OUTPATIENT)
Dept: FAMILY MEDICINE CLINIC | Age: 57
End: 2022-01-26

## 2022-01-26 NOTE — TELEPHONE ENCOUNTER
----- Message from [facility] sent at 1/26/2022  1:56 PM EST -----  Subject: Message to Provider    QUESTIONS  Information for Provider? Patient brother is calling to schedule a   Halperidol Injection I tried calling the office no answer please call   patient back  ---------------------------------------------------------------------------  --------------  CALL BACK INFO  What is the best way for the office to contact you? OK to leave message on   voicemail  Preferred Call Back Phone Number? [phone]  ---------------------------------------------------------------------------  --------------  SCRIPT ANSWERS  Relationship to Patient? Other  Representative Name? jo ann  Is the Representative on the appropriate HIPAA document in Epic?  Yes

## 2022-01-28 NOTE — TELEPHONE ENCOUNTER
----- Message from Sheila Santhosh sent at 1/28/2022  1:59 PM EST -----  Subject: Message to Provider    QUESTIONS  Information for Provider? Patients  i calling to schedule 2 week   injection appointment. cant seem to get through to the office. Brother mandy Garcia Vlad [phone]  ---------------------------------------------------------------------------  --------------  Li SKELTON  What is the best way for the office to contact you? OK to leave message on   voicemail  Preferred Call Back Phone Number? [phone]  ---------------------------------------------------------------------------  --------------  SCRIPT ANSWERS  Relationship to Patient?  Self

## 2022-01-31 ENCOUNTER — TELEPHONE (OUTPATIENT)
Dept: FAMILY MEDICINE CLINIC | Age: 57
End: 2022-01-31

## 2022-01-31 NOTE — TELEPHONE ENCOUNTER
----- Message from Henri Almanza sent at 2022  3:02 PM EST -----  Subject: Appointment Request    Reason for Call: New Patient Request Appointment    QUESTIONS  Type of Appointment? Established Patient  Reason for appointment request? Available appointments did not meet   patient need  Additional Information for Provider?  is not set up and the patient was   unaware that Dr Ashley Alcazar is no longer practicing at that location. requesting   to establish with another provider at that location as soon as possible. His blood sugar has been running high and they are concerned he is   diabetic.  ---------------------------------------------------------------------------  --------------  CALL BACK INFO  What is the best way for the office to contact you? OK to leave message on   voicemail  Preferred Call Back Phone Number? [phone]  ---------------------------------------------------------------------------  --------------  SCRIPT ANSWERS  Relationship to Patient? Other  Representative Name? hung  Additional information verified (besides Name and Date of Birth)? Address  Specialty Confirmation? Primary Care  Is this the first appointment to establish care for a ? No  Have you been diagnosed with, awaiting test results for, or told that you   are suspected of having COVID-19 (Coronavirus)? (If patient has tested   negative or was tested as a requirement for work, school, or travel and   not based on symptoms, answer no)? No  Within the past two weeks have you developed any of the following symptoms   (answer no if symptoms have been present longer than 2 weeks or began   more than 2 weeks ago)? Fever or Chills, Cough, Shortness of breath or   difficulty breathing, Loss of taste or smell, Sore throat, Nasal   congestion, Sneezing or runny nose, Fatigue or generalized body aches   (answer no if pain is specific to a body part e.g. back pain), Diarrhea,   Headache?  No  Have you had close contact with someone with COVID-19 in the last 14 days? No  (Service Expert  click yes below to proceed with [employer] As Usual   Scheduling)?  Yes

## 2022-02-08 ENCOUNTER — CLINICAL SUPPORT (OUTPATIENT)
Dept: FAMILY MEDICINE CLINIC | Age: 57
End: 2022-02-08
Payer: MEDICARE

## 2022-02-08 DIAGNOSIS — Z23 ENCOUNTER FOR IMMUNIZATION: Primary | ICD-10-CM

## 2022-02-08 PROCEDURE — 96372 THER/PROPH/DIAG INJ SC/IM: CPT | Performed by: NURSE PRACTITIONER

## 2022-02-08 RX ORDER — HALOPERIDOL DECANOATE 100 MG/ML
100 INJECTION INTRAMUSCULAR ONCE
Status: COMPLETED | OUTPATIENT
Start: 2022-02-08 | End: 2022-02-08

## 2022-02-08 RX ADMIN — HALOPERIDOL DECANOATE 100 MG: 100 INJECTION INTRAMUSCULAR at 15:53

## 2022-02-10 ENCOUNTER — OFFICE VISIT (OUTPATIENT)
Dept: FAMILY MEDICINE CLINIC | Age: 57
End: 2022-02-10
Payer: MEDICARE

## 2022-02-10 VITALS
RESPIRATION RATE: 18 BRPM | TEMPERATURE: 98.1 F | WEIGHT: 145 LBS | BODY MASS INDEX: 24.16 KG/M2 | SYSTOLIC BLOOD PRESSURE: 112 MMHG | OXYGEN SATURATION: 98 % | DIASTOLIC BLOOD PRESSURE: 78 MMHG | HEART RATE: 72 BPM | HEIGHT: 65 IN

## 2022-02-10 DIAGNOSIS — R26.9 GAIT DISTURBANCE: ICD-10-CM

## 2022-02-10 DIAGNOSIS — E11.9 TYPE 2 DIABETES MELLITUS WITHOUT COMPLICATION, WITHOUT LONG-TERM CURRENT USE OF INSULIN (HCC): Primary | ICD-10-CM

## 2022-02-10 DIAGNOSIS — Z91.81 AT RISK FOR FALLS: ICD-10-CM

## 2022-02-10 DIAGNOSIS — Z51.81 THERAPEUTIC DRUG MONITORING: ICD-10-CM

## 2022-02-10 DIAGNOSIS — F20.9 SCHIZOPHRENIA, UNSPECIFIED TYPE (HCC): ICD-10-CM

## 2022-02-10 PROCEDURE — 2022F DILAT RTA XM EVC RTNOPTHY: CPT | Performed by: NURSE PRACTITIONER

## 2022-02-10 PROCEDURE — G8420 CALC BMI NORM PARAMETERS: HCPCS | Performed by: NURSE PRACTITIONER

## 2022-02-10 PROCEDURE — 3017F COLORECTAL CA SCREEN DOC REV: CPT | Performed by: NURSE PRACTITIONER

## 2022-02-10 PROCEDURE — 3044F HG A1C LEVEL LT 7.0%: CPT | Performed by: NURSE PRACTITIONER

## 2022-02-10 PROCEDURE — G9717 DOC PT DX DEP/BP F/U NT REQ: HCPCS | Performed by: NURSE PRACTITIONER

## 2022-02-10 PROCEDURE — G8427 DOCREV CUR MEDS BY ELIG CLIN: HCPCS | Performed by: NURSE PRACTITIONER

## 2022-02-10 PROCEDURE — 99214 OFFICE O/P EST MOD 30 MIN: CPT | Performed by: NURSE PRACTITIONER

## 2022-02-10 RX ORDER — TEMAZEPAM 30 MG/1
CAPSULE ORAL
COMMUNITY
Start: 2022-02-04 | End: 2022-05-17 | Stop reason: ALTCHOICE

## 2022-02-10 NOTE — PROGRESS NOTES
Chief Complaint   Patient presents with    Other     talk about sugars      Visit Vitals  /78 (BP 1 Location: Right upper arm, BP Patient Position: Sitting, BP Cuff Size: Adult)   Pulse 72   Temp 98.1 °F (36.7 °C) (Temporal)   Resp 18   Ht 5' 5\" (1.651 m)   Wt 145 lb (65.8 kg)   SpO2 98%   BMI 24.13 kg/m²     Subjective  Shirley Christopher is a 60 y.o. male. HPI   Pt presented for f/u. He was accompanied by his brother who is caretaker. He is new to me. Former PCP was Dr. Gemma Burns who has left the practice. Brother provided 100% of info for HPI. Patient also needs orders for routine labs today. Medical hx significant for schizophrenia, tremor, controlled type 2 diabetes mellitus, memory impairment, gait disturbance, depression, anxiety, and intellectual disability. Pt is under the care of a psychiatrist for his psychiatric issues and has regular follow-up for medication management. Brother informed me of recent medication changes and I have updated his medication record. Brother feels the patient is doing well at this time. I asked brother if patient is getting blood work done to check his lithium level and he was unaware. Since lithium has a narrow therapeutic window, I will check his level today. Type II DM- last A1c in record from May 2021 when it was 5.2- in well controlled range. Currently taking any medication for diabetes. Patient is at risk for falls as he has an unsteady gait. Discussed ordering Rollator for patient and brother was receptive. Patient Active Problem List   Diagnosis Code    Schizophrenia ([address] F20.9    Tremor R25.1    Type 2 diabetes mellitus without complication ([address] D99.5    Memory impairment R41.3    Gait disturbance R26.9    Depression with suicidal ideation F32. A, R45.851    Vitamin D deficiency E55.9     Past Medical History:   Diagnosis Date    Anxiety     Depression     Intellectual disability     Schizoaffective disorder, bipolar type ([address]     Schizophrenia [facility])      History reviewed. No pertinent surgical history. Current Outpatient Medications   Medication Instructions    benztropine (COGENTIN) 1 mg tablet 2 Tablets, Oral, EVERY MORNING    benztropine (COGENTIN) 1 mg tablet 1 Tablet, Oral, EVERY EVENING    buPROPion XL (WELLBUTRIN XL) 150 mg, Oral, EVERY BEDTIME    FLUoxetine (PROZAC) 40 mg, Oral, DAILY    FLUoxetine (PROZAC) 20 mg, Oral, EVERY BEDTIME    haloperidoL (HALDOL) 5 mg tablet No dose, route, or frequency recorded.  haloperidol decanoate (HALDOL DECANOATE) 100 mg/mL injection IntraMUSCular, EVERY 14 DAYS    LITHIUM CARBONATE  mg, Oral, DAILY    risperiDONE (RISPERDAL) 3 mg, Oral, 2 TIMES DAILY    temazepam (RESTORIL) 30 mg capsule TAKE 1 CAPSULE BY MOUTH AT BEDTIME     No Known Allergies  Social History     Tobacco Use    Smoking status: Never Smoker    Smokeless tobacco: Never Used   Vaping Use    Vaping Use: Never used   Substance Use Topics    Alcohol use: Never    Drug use: Never     Family History   Problem Relation Age of Onset    Cancer Father         bone    Pancreatic Cancer Father     Dementia Mother      Review of Systems   Constitutional: Negative for chills, fever and malaise/fatigue. HENT: Negative for congestion, ear pain and sore throat. Respiratory: Negative for cough, shortness of breath and wheezing. Cardiovascular: Negative for chest pain, palpitations and leg swelling. Gastrointestinal: Negative for abdominal pain, constipation, diarrhea, heartburn, nausea and vomiting. Genitourinary: Negative for dysuria. Musculoskeletal: Negative for back pain, falls, joint pain, myalgias and neck pain. Neurological: Negative for dizziness, tingling, sensory change, weakness and headaches. Psychiatric/Behavioral: Positive for memory loss. Negative for depression. The patient is not nervous/anxious and does not have insomnia.         Objective  Physical Exam  Constitutional:       General: He is not in acute distress. Appearance: Normal appearance. HENT:      Head: Normocephalic. Right Ear: External ear normal.      Left Ear: External ear normal.      Nose: Nose normal.   Eyes:      Conjunctiva/sclera: Conjunctivae normal.   Neck:      Vascular: No carotid bruit. Cardiovascular:      Rate and Rhythm: Normal rate and regular rhythm. Heart sounds: Normal heart sounds. No murmur heard. Pulmonary:      Effort: Pulmonary effort is normal. No respiratory distress. Breath sounds: Normal breath sounds. Musculoskeletal:         General: No swelling or tenderness. Normal range of motion. Cervical back: Neck supple. Right lower leg: No edema. Left lower leg: No edema. Skin:     General: Skin is warm and dry. Coloration: Skin is not jaundiced. Findings: No lesion or rash. Neurological:      Mental Status: He is alert and oriented to person, place, and time. Motor: No weakness. Gait: Gait normal.   Psychiatric:      Comments: Pt answered simple questions appropriately. Assessment & Plan      ICD-10-CM ICD-9-CM    1. Type 2 diabetes mellitus without complication, without long-term current use of insulin (HCC)  E11.9 250.00 CBC WITH AUTOMATED DIFF      RETICULOCYTE COUNT      LIPID PANEL      METABOLIC PANEL, COMPREHENSIVE      HEMOGLOBIN A1C WITH EAG      MICROALBUMIN, UR, RAND W/ MICROALB/CREAT RATIO   2. Schizophrenia, unspecified type ([facility]  F20.9 295.90    3. Therapeutic drug monitoring  Z51.81 V58.83 LITHIUM   4. Gait disturbance  R26.9 781.2 AMB SUPPLY ORDER   5. At risk for falls  Z91.81 V15.88 AMB SUPPLY ORDER       1. Type 2 diabetes mellitus without complication, without long-term current use of insulin (HCC)  Currently stable and A1c in normal range w/o meds. - CBC WITH AUTOMATED DIFF  - RETICULOCYTE COUNT  - LIPID PANEL  - METABOLIC PANEL, COMPREHENSIVE  - HEMOGLOBIN A1C WITH EAG  - MICROALBUMIN, UR, RAND W/ MICROALB/CREAT RATIO    2. Schizophrenia, unspecified type ([facility]  Stable-remain under care of psychiatrist for medication management. 3. Therapeutic drug monitoring  All notify psychiatrist if any abnormalities in the lithium level.    - LITHIUM    4. Gait disturbance    - AMB SUPPLY ORDER    5. At risk for falls  Discussed safety measures in home such a avoidance of throw rugs and good lighting and bathroom safety equipment.     - AMB SUPPLY ORDER    I have discussed the diagnosis with the patient and the intended plan as seen in the above orders. Pt/caretaker has expressed understanding. Questions were answered concerning future plans. I have discussed medication side effects and warnings as indicated with the patient as well.     Jacoby Delgado, RAMIREZ

## 2022-02-10 NOTE — PROGRESS NOTES
Visit Vitals  /78 (BP 1 Location: Right upper arm, BP Patient Position: Sitting, BP Cuff Size: Adult)   Pulse 72   Temp 98.1 °F (36.7 °C) (Temporal)   Resp 18   Ht 5' 5\" (1.651 m)   Wt 145 lb (65.8 kg)   SpO2 98%   BMI 24.13 kg/m²     Chief Complaint   Patient presents with    Other     talk about sugars

## 2022-02-11 LAB
ALBUMIN SERPL-MCNC: 4.5 G/DL (ref 3.8–4.9)
ALBUMIN/CREAT UR: 4 MG/G CREAT (ref 0–29)
ALBUMIN/GLOB SERPL: 2 {RATIO} (ref 1.2–2.2)
ALP SERPL-CCNC: 72 IU/L (ref 44–121)
ALT SERPL-CCNC: 77 IU/L (ref 0–44)
AST SERPL-CCNC: 47 IU/L (ref 0–40)
BASOPHILS # BLD AUTO: 0 X10E3/UL (ref 0–0.2)
BASOPHILS NFR BLD AUTO: 0 %
BILIRUB SERPL-MCNC: 0.3 MG/DL (ref 0–1.2)
BUN SERPL-MCNC: 18 MG/DL (ref 6–24)
BUN/CREAT SERPL: 18 (ref 9–20)
CALCIUM SERPL-MCNC: 9.1 MG/DL (ref 8.7–10.2)
CHLORIDE SERPL-SCNC: 106 MMOL/L (ref 96–106)
CHOLEST SERPL-MCNC: 179 MG/DL (ref 100–199)
CO2 SERPL-SCNC: 21 MMOL/L (ref 20–29)
CREAT SERPL-MCNC: 0.98 MG/DL (ref 0.76–1.27)
CREAT UR-MCNC: 73.1 MG/DL
EOSINOPHIL # BLD AUTO: 0.1 X10E3/UL (ref 0–0.4)
EOSINOPHIL NFR BLD AUTO: 1 %
ERYTHROCYTE [DISTWIDTH] IN BLOOD BY AUTOMATED COUNT: 12.8 % (ref 11.6–15.4)
EST. AVERAGE GLUCOSE BLD GHB EST-MCNC: 103 MG/DL
GLOBULIN SER CALC-MCNC: 2.2 G/DL (ref 1.5–4.5)
GLUCOSE SERPL-MCNC: 122 MG/DL (ref 65–99)
HBA1C MFR BLD: 5.2 % (ref 4.8–5.6)
HCT VFR BLD AUTO: 41.6 % (ref 37.5–51)
HDLC SERPL-MCNC: 48 MG/DL
HGB BLD-MCNC: 14 G/DL (ref 13–17.7)
IMM GRANULOCYTES # BLD AUTO: 0 X10E3/UL (ref 0–0.1)
IMM GRANULOCYTES NFR BLD AUTO: 1 %
LDLC SERPL CALC-MCNC: 115 MG/DL (ref 0–99)
LITHIUM SERPL-SCNC: 0.8 MMOL/L (ref 0.5–1.2)
LYMPHOCYTES # BLD AUTO: 0.9 X10E3/UL (ref 0.7–3.1)
LYMPHOCYTES NFR BLD AUTO: 11 %
MCH RBC QN AUTO: 30 PG (ref 26.6–33)
MCHC RBC AUTO-ENTMCNC: 33.7 G/DL (ref 31.5–35.7)
MCV RBC AUTO: 89 FL (ref 79–97)
MICROALBUMIN UR-MCNC: 3.1 UG/ML
MONOCYTES # BLD AUTO: 0.7 X10E3/UL (ref 0.1–0.9)
MONOCYTES NFR BLD AUTO: 9 %
NEUTROPHILS # BLD AUTO: 5.9 X10E3/UL (ref 1.4–7)
NEUTROPHILS NFR BLD AUTO: 78 %
PLATELET # BLD AUTO: 227 X10E3/UL (ref 150–450)
POTASSIUM SERPL-SCNC: 4.2 MMOL/L (ref 3.5–5.2)
PROT SERPL-MCNC: 6.7 G/DL (ref 6–8.5)
RBC # BLD AUTO: 4.66 X10E6/UL (ref 4.14–5.8)
RETICS/RBC NFR AUTO: 2 % (ref 0.6–2.6)
SODIUM SERPL-SCNC: 143 MMOL/L (ref 134–144)
TRIGL SERPL-MCNC: 89 MG/DL (ref 0–149)
VLDLC SERPL CALC-MCNC: 16 MG/DL (ref 5–40)
WBC # BLD AUTO: 7.6 X10E3/UL (ref 3.4–10.8)

## 2022-02-22 ENCOUNTER — CLINICAL SUPPORT (OUTPATIENT)
Dept: FAMILY MEDICINE CLINIC | Age: 57
End: 2022-02-22
Payer: MEDICARE

## 2022-02-22 DIAGNOSIS — F20.9 SCHIZOPHRENIA, UNSPECIFIED TYPE (HCC): Primary | ICD-10-CM

## 2022-02-22 PROCEDURE — 96372 THER/PROPH/DIAG INJ SC/IM: CPT | Performed by: NURSE PRACTITIONER

## 2022-02-22 RX ADMIN — HALOPERIDOL DECANOATE 100 MG: 100 INJECTION INTRAMUSCULAR at 15:24

## 2022-03-08 ENCOUNTER — CLINICAL SUPPORT (OUTPATIENT)
Dept: FAMILY MEDICINE CLINIC | Age: 57
End: 2022-03-08
Payer: MEDICARE

## 2022-03-08 DIAGNOSIS — F20.9 SCHIZOPHRENIA, UNSPECIFIED TYPE (HCC): Primary | ICD-10-CM

## 2022-03-08 PROCEDURE — 96372 THER/PROPH/DIAG INJ SC/IM: CPT | Performed by: NURSE PRACTITIONER

## 2022-03-08 RX ADMIN — HALOPERIDOL DECANOATE 100 MG: 100 INJECTION INTRAMUSCULAR at 14:56

## 2022-03-18 PROBLEM — E11.9 TYPE 2 DIABETES MELLITUS WITHOUT COMPLICATION (HCC): Status: ACTIVE | Noted: 2020-07-31

## 2022-03-18 PROBLEM — F20.9 SCHIZOPHRENIA (HCC): Status: ACTIVE | Noted: 2020-07-31

## 2022-03-19 PROBLEM — R26.9 GAIT DISTURBANCE: Status: ACTIVE | Noted: 2021-06-10

## 2022-03-19 PROBLEM — R41.3 MEMORY IMPAIRMENT: Status: ACTIVE | Noted: 2020-07-31

## 2022-03-19 PROBLEM — R25.1 TREMOR: Status: ACTIVE | Noted: 2020-07-31

## 2022-03-20 PROBLEM — F32.A DEPRESSION WITH SUICIDAL IDEATION: Status: ACTIVE | Noted: 2021-09-05

## 2022-03-20 PROBLEM — R45.851 DEPRESSION WITH SUICIDAL IDEATION: Status: ACTIVE | Noted: 2021-09-05

## 2022-03-22 ENCOUNTER — CLINICAL SUPPORT (OUTPATIENT)
Dept: FAMILY MEDICINE CLINIC | Age: 57
End: 2022-03-22
Payer: MEDICARE

## 2022-03-22 DIAGNOSIS — F20.9 SCHIZOPHRENIA, UNSPECIFIED TYPE (HCC): Primary | ICD-10-CM

## 2022-03-22 PROCEDURE — 96372 THER/PROPH/DIAG INJ SC/IM: CPT | Performed by: NURSE PRACTITIONER

## 2022-03-22 RX ADMIN — HALOPERIDOL DECANOATE 100 MG: 100 INJECTION INTRAMUSCULAR at 14:55

## 2022-04-06 ENCOUNTER — CLINICAL SUPPORT (OUTPATIENT)
Dept: FAMILY MEDICINE CLINIC | Age: 57
End: 2022-04-06
Payer: MEDICARE

## 2022-04-06 DIAGNOSIS — F20.9 SCHIZOPHRENIA, UNSPECIFIED TYPE (HCC): Primary | ICD-10-CM

## 2022-04-06 RX ADMIN — HALOPERIDOL DECANOATE 100 MG: 100 INJECTION INTRAMUSCULAR at 08:43

## 2022-04-20 ENCOUNTER — CLINICAL SUPPORT (OUTPATIENT)
Dept: FAMILY MEDICINE CLINIC | Age: 57
End: 2022-04-20
Payer: MEDICARE

## 2022-04-20 DIAGNOSIS — F20.9 SCHIZOPHRENIA, UNSPECIFIED TYPE (HCC): Primary | ICD-10-CM

## 2022-04-20 RX ADMIN — HALOPERIDOL DECANOATE 100 MG: 100 INJECTION INTRAMUSCULAR at 14:56

## 2022-05-03 ENCOUNTER — CLINICAL SUPPORT (OUTPATIENT)
Dept: FAMILY MEDICINE CLINIC | Age: 57
End: 2022-05-03
Payer: MEDICARE

## 2022-05-03 DIAGNOSIS — F20.0 PARANOID SCHIZOPHRENIA (HCC): Primary | ICD-10-CM

## 2022-05-03 PROCEDURE — 96372 THER/PROPH/DIAG INJ SC/IM: CPT | Performed by: NURSE PRACTITIONER

## 2022-05-03 RX ADMIN — HALOPERIDOL DECANOATE 100 MG: 100 INJECTION INTRAMUSCULAR at 15:05

## 2022-05-12 PROBLEM — E55.9 VITAMIN D DEFICIENCY: Status: ACTIVE | Noted: 2022-05-12

## 2022-05-12 RX ORDER — BENZTROPINE MESYLATE 1 MG/1
2 TABLET ORAL EVERY MORNING
COMMUNITY
End: 2022-05-17 | Stop reason: DRUGHIGH

## 2022-05-12 RX ORDER — BENZTROPINE MESYLATE 1 MG/1
1 TABLET ORAL EVERY EVENING
COMMUNITY
End: 2022-05-17 | Stop reason: DRUGHIGH

## 2022-05-12 RX ORDER — HALOPERIDOL DECANOATE 100 MG/ML
INJECTION INTRAMUSCULAR
COMMUNITY
End: 2022-05-17 | Stop reason: SDUPTHER

## 2022-05-17 ENCOUNTER — OFFICE VISIT (OUTPATIENT)
Dept: FAMILY MEDICINE CLINIC | Age: 57
End: 2022-05-17
Payer: MEDICARE

## 2022-05-17 VITALS
HEART RATE: 80 BPM | OXYGEN SATURATION: 97 % | TEMPERATURE: 97.8 F | HEIGHT: 65 IN | BODY MASS INDEX: 24.32 KG/M2 | SYSTOLIC BLOOD PRESSURE: 116 MMHG | DIASTOLIC BLOOD PRESSURE: 74 MMHG | WEIGHT: 146 LBS | RESPIRATION RATE: 18 BRPM

## 2022-05-17 DIAGNOSIS — L03.317 CELLULITIS OF BUTTOCK: ICD-10-CM

## 2022-05-17 DIAGNOSIS — R79.89 ELEVATED LIVER FUNCTION TESTS: ICD-10-CM

## 2022-05-17 DIAGNOSIS — F20.0 PARANOID SCHIZOPHRENIA (HCC): Primary | ICD-10-CM

## 2022-05-17 PROCEDURE — 3017F COLORECTAL CA SCREEN DOC REV: CPT | Performed by: NURSE PRACTITIONER

## 2022-05-17 PROCEDURE — 99214 OFFICE O/P EST MOD 30 MIN: CPT | Performed by: NURSE PRACTITIONER

## 2022-05-17 PROCEDURE — G8427 DOCREV CUR MEDS BY ELIG CLIN: HCPCS | Performed by: NURSE PRACTITIONER

## 2022-05-17 PROCEDURE — G8420 CALC BMI NORM PARAMETERS: HCPCS | Performed by: NURSE PRACTITIONER

## 2022-05-17 PROCEDURE — G9717 DOC PT DX DEP/BP F/U NT REQ: HCPCS | Performed by: NURSE PRACTITIONER

## 2022-05-17 RX ORDER — CEPHALEXIN 500 MG/1
CAPSULE ORAL
Qty: 40 CAPSULE | Refills: 0 | Status: SHIPPED | OUTPATIENT
Start: 2022-05-17 | End: 2022-08-17 | Stop reason: ALTCHOICE

## 2022-05-17 RX ORDER — LITHIUM CARBONATE 300 MG/1
600 TABLET, FILM COATED, EXTENDED RELEASE ORAL DAILY
COMMUNITY
Start: 2022-05-03

## 2022-05-17 RX ORDER — HALOPERIDOL 5 MG/1
1.25 TABLET ORAL DAILY
COMMUNITY
End: 2022-10-24

## 2022-05-17 RX ORDER — HALOPERIDOL DECANOATE 100 MG/ML
INJECTION INTRAMUSCULAR
Qty: 1 ML | Refills: 0 | Status: SHIPPED | OUTPATIENT
Start: 2022-05-17 | End: 2022-06-01

## 2022-05-17 RX ORDER — BENZTROPINE MESYLATE 1 MG/1
2 TABLET ORAL 2 TIMES DAILY
COMMUNITY
End: 2022-10-24

## 2022-05-17 NOTE — PROGRESS NOTES
1. \"Have you been to the ER, urgent care clinic since your last visit? Hospitalized since your last visit?  no    2. \"Have you seen or consulted any other health care providers outside of the [address] [place] [address] since your last visit? \" no     3. For patients aged 39-70: Has the patient had a colonoscopy / FIT/ Cologuard? Pt brother stated over 10 years ago     If the patient is female:    4. For patients aged 41-77: Has the patient had a mammogram within the past 2 years? Na        5. For patients aged 21-65: Has the patient had a pap smear?   Na         Shirley Christopher is a 60 y.o. male is coming in for with the following:     Chief Complaint   Patient presents with    Follow-up          With the following vitals:    Visit Vitals  /74 (BP 1 Location: Right upper arm, BP Patient Position: Sitting, BP Cuff Size: Adult)   Pulse 80   Temp 97.8 °F (36.6 °C) (Temporal)   Resp 18   Ht 5' 5\" (1.651 m)   Wt 146 lb (66.2 kg)   SpO2 97%   BMI 24.30 kg/m²

## 2022-05-17 NOTE — PROGRESS NOTES
Chief Complaint   Patient presents with    Follow-up     Visit Vitals  /74 (BP 1 Location: Right upper arm, BP Patient Position: Sitting, BP Cuff Size: Adult)   Pulse 80   Temp 97.8 °F (36.6 °C) (Temporal)   Resp 18   Ht 5' 5\" (1.651 m)   Wt 146 lb (66.2 kg)   SpO2 97%   BMI 24.30 kg/m²     Subjective  Shirley Christopher is a 60 y.o. male. HPI   Patient presented for follow-up. He was accompanied by his brother who is caretaker. Brother provided 100% of info for HPI. Medical hx significant for schizophrenia, tremor, controlled type 2 diabetes mellitus, memory impairment, gait disturbance, depression, anxiety, and intellectual disability. Pt is under the care of a psychiatrist for his psychiatric issues and has regular follow-up for medication management. He was seen by psychiatrist earlier today. Brother informed me of recent medication changes and I have updated his medication record. Brother had worries that the patient was too sedated so he is splitting the Haldol 5 mg into four sections and giving him 1.25 mg daily. In addition patient receives Haldol every 2 weeks IM and is due for 100 mg IM dose today. Brother stated that he informed the psychiatrist about this med change. Also, patient is currently taking Cogentin milligrams total daily. However the psychiatrist today gave the brother 2 sample boxes of new medicine Ingrezza 40 mg daily to use until the medication is approved by his insurance. If the medicine is approved the patient will stop taking the Cogentin. Brother was informed that the lithium level that was done after last visit in February 2022 was in the therapeutic range. Patient continues on same dose. Was informed that several liver function tests on his last lab work was a little elevated.   I will repeat the lab work to see if has gone back to normal.  Type II DM- his diabetes is very well controlled with latest A1c of 5.2 he is not currently taking medication for diabetes. Hypercholesterolemia- patient had a minor elevation in the LDL to 115 which is a change from the last 1 approximately 1 year ago when it was 87. Patient is not currently on medication and this is something that will be monitored. Brother stated he will do his best to keep patient on a heart healthy diet. Patient Active Problem List   Diagnosis Code    Schizophrenia ([facility] F20.9    Tremor R25.1    Type 2 diabetes mellitus without complication ([facility] X82.3    Memory impairment R41.3    Gait disturbance R26.9    Depression with suicidal ideation F32. A, R45.851    Vitamin D deficiency E55.9     Past Medical History:   Diagnosis Date    Anxiety     Depression     Intellectual disability     Schizoaffective disorder, bipolar type ([facility]     Schizophrenia ([facility]      History reviewed. No pertinent surgical history. Current Outpatient Medications   Medication Instructions    benztropine (COGENTIN) 1 mg tablet 2 Tablets, Oral, 2 TIMES DAILY    buPROPion XL (WELLBUTRIN XL) 150 mg, Oral, EVERY BEDTIME    cephALEXin (KEFLEX) 500 mg capsule Take 1 tab by mouth every 6 hours (4 x day) for 10 days for skin infection.  FLUoxetine (PROZAC) 40 mg, Oral, DAILY    FLUoxetine (PROZAC) 20 mg, Oral, EVERY BEDTIME    haloperidoL (HALDOL) 1.25 mg, Oral, DAILY    haloperidol decanoate (HALDOL DECANOATE) 100 mg/mL injection Inject 100 mg (1 ml) IM one dose every 14 days.     lithium carbonate SR (LITHOBID) 900 mg, Oral, DAILY    risperiDONE (RISPERDAL) 3 mg, Oral, 2 TIMES DAILY     No Known Allergies  Social History     Tobacco Use    Smoking status: Never Smoker    Smokeless tobacco: Never Used   Vaping Use    Vaping Use: Never used   Substance Use Topics    Alcohol use: Never    Drug use: Never     Family History   Problem Relation Age of Onset    Cancer Father         bone    Pancreatic Cancer Father     Dementia Mother      Review of Systems   Constitutional: Negative for chills, fever and malaise/fatigue. HENT: Negative for congestion, ear pain and sore throat. Respiratory: Negative for cough, shortness of breath and wheezing. Cardiovascular: Negative for chest pain, palpitations and leg swelling. Gastrointestinal: Negative. Negative for abdominal pain, diarrhea, heartburn, nausea and vomiting. Genitourinary: Negative. Musculoskeletal: Negative for back pain, falls, joint pain, myalgias and neck pain. Neurological: Negative for dizziness, tingling, sensory change, weakness and headaches. Psychiatric/Behavioral: Negative for depression. The patient is not nervous/anxious and does not have insomnia. Objective  Physical Exam  Constitutional:       General: He is not in acute distress. Appearance: Normal appearance. HENT:      Head: Normocephalic. Right Ear: External ear normal.      Left Ear: External ear normal.      Nose: Nose normal.   Eyes:      Conjunctiva/sclera: Conjunctivae normal.   Neck:      Vascular: No carotid bruit. Cardiovascular:      Rate and Rhythm: Normal rate and regular rhythm. Heart sounds: Normal heart sounds. No murmur heard. Pulmonary:      Effort: Pulmonary effort is normal. No respiratory distress. Breath sounds: Normal breath sounds. Musculoskeletal:         General: No swelling or tenderness. Normal range of motion. Cervical back: Neck supple. Right lower leg: No edema. Left lower leg: No edema. Skin:     General: Skin is warm and dry. Coloration: Skin is not jaundiced. Findings: No lesion or rash. Comments: Patient was noted to have a large area of dark red skin which when palpated revealed a large area of hard tissue under the skin. It was located on the right side of his upper buttocks where apparently he had a Haldol shot 2 weeks ago. He stated that initially it was quite swollen but has improved but is still not completely resolved.   No drainage noted and did not feel warm to palpation. Neurological:      Mental Status: He is alert and oriented to person, place, and time. Motor: No weakness. Gait: Gait normal.   Psychiatric:         Mood and Affect: Mood normal.         Behavior: Behavior normal.         Thought Content: Thought content normal.         Judgment: Judgment normal.       Assessment & Plan      ICD-10-CM ICD-9-CM    1. Paranoid schizophrenia ([facility]  F20.0 295.30 haloperidol decanoate (HALDOL DECANOATE) 100 mg/mL injection   2. Elevated liver function tests  E03.78 890.1 METABOLIC PANEL, COMPREHENSIVE   3. Cellulitis of buttock  L03.317 682.5 cephALEXin (KEFLEX) 500 mg capsule       1. Paranoid schizophrenia ([facility]  Stable on current medication regime. All recent changes were updated on his med list.  Patient was given the Haldol injection on opposite side of the noted cellulitis of the buttocks. - haloperidol decanoate (HALDOL DECANOATE) 100 mg/mL injection; Inject 100 mg (1 ml) IM one dose every 14 days. Dispense: 1 mL; Refill: 0    2. Elevated liver function tests  We will repeat lab work to see if liver function tests have stabilized to back to normal.  If not we will do a review of his medications to see which ones might be possibly affecting his liver function tests. - METABOLIC PANEL, COMPREHENSIVE    3. Cellulitis of buttock  Informed brother that I would order a course of antibiotics for the patient and we have asked the brother to let us know if it does not improve. - cephALEXin (KEFLEX) 500 mg capsule; Take 1 tab by mouth every 6 hours (4 x day) for 10 days for skin infection. Dispense: 40 Capsule; Refill: 0  I have discussed the diagnosis with the patient and the intended plan as seen in the above orders. Pt/caretaker has expressed understanding. Questions were answered concerning future plans. I have discussed medication side effects and warnings as indicated with the patient as well.     Alvarado Rivas NP

## 2022-05-31 ENCOUNTER — CLINICAL SUPPORT (OUTPATIENT)
Dept: FAMILY MEDICINE CLINIC | Age: 57
End: 2022-05-31
Payer: MEDICARE

## 2022-05-31 DIAGNOSIS — F20.9 SCHIZOPHRENIA, UNSPECIFIED TYPE (HCC): Primary | ICD-10-CM

## 2022-05-31 PROCEDURE — 96372 THER/PROPH/DIAG INJ SC/IM: CPT | Performed by: NURSE PRACTITIONER

## 2022-05-31 RX ORDER — HALOPERIDOL DECANOATE 100 MG/ML
100 INJECTION INTRAMUSCULAR
Status: DISCONTINUED | OUTPATIENT
Start: 2022-05-31 | End: 2022-10-26

## 2022-05-31 RX ADMIN — HALOPERIDOL DECANOATE 100 MG: 100 INJECTION INTRAMUSCULAR at 15:00

## 2022-06-01 ENCOUNTER — HOSPITAL ENCOUNTER (EMERGENCY)
Age: 57
Discharge: HOME | End: 2022-06-02
Attending: STUDENT IN AN ORGANIZED HEALTH CARE EDUCATION/TRAINING PROGRAM
Payer: MEDICARE

## 2022-06-01 VITALS
DIASTOLIC BLOOD PRESSURE: 80 MMHG | HEART RATE: 73 BPM | BODY MASS INDEX: 23.32 KG/M2 | TEMPERATURE: 97.6 F | RESPIRATION RATE: 18 BRPM | OXYGEN SATURATION: 96 % | WEIGHT: 140 LBS | HEIGHT: 65 IN | SYSTOLIC BLOOD PRESSURE: 126 MMHG

## 2022-06-01 DIAGNOSIS — R46.89 AGGRESSIVE BEHAVIOR: Primary | ICD-10-CM

## 2022-06-01 LAB
ALBUMIN SERPL-MCNC: 3.5 G/DL (ref 3.5–5)
ALBUMIN SERPL-MCNC: 4.3 G/DL (ref 3.8–4.9)
ALBUMIN/GLOB SERPL: 1.3 {RATIO} (ref 1.1–2.2)
ALBUMIN/GLOB SERPL: 2.3 {RATIO} (ref 1.2–2.2)
ALP SERPL-CCNC: 56 U/L (ref 45–117)
ALP SERPL-CCNC: 58 IU/L (ref 44–121)
ALT SERPL-CCNC: 20 IU/L (ref 0–44)
ALT SERPL-CCNC: 34 U/L (ref 12–78)
AMPHET UR QL SCN: NEGATIVE
ANION GAP SERPL CALC-SCNC: 5 MMOL/L (ref 5–15)
APAP SERPL-MCNC: <10 UG/ML (ref 10–30)
APPEARANCE UR: CLEAR
AST SERPL W P-5'-P-CCNC: 20 U/L (ref 15–37)
AST SERPL-CCNC: 17 IU/L (ref 0–40)
BACTERIA URNS QL MICRO: NEGATIVE /HPF
BARBITURATES UR QL SCN: NEGATIVE
BASOPHILS # BLD: 0 K/UL (ref 0–0.1)
BASOPHILS NFR BLD: 0 % (ref 0–1)
BENZODIAZ UR QL: NEGATIVE
BILIRUB SERPL-MCNC: 0.2 MG/DL (ref 0.2–1)
BILIRUB SERPL-MCNC: 0.3 MG/DL (ref 0–1.2)
BILIRUB UR QL: NEGATIVE
BUN SERPL-MCNC: 13 MG/DL (ref 6–20)
BUN SERPL-MCNC: 17 MG/DL (ref 6–24)
BUN/CREAT SERPL: 14 (ref 12–20)
BUN/CREAT SERPL: 15 (ref 9–20)
CA-I BLD-MCNC: 8.6 MG/DL (ref 8.5–10.1)
CALCIUM SERPL-MCNC: 8.9 MG/DL (ref 8.7–10.2)
CANNABINOIDS UR QL SCN: NEGATIVE
CHLORIDE SERPL-SCNC: 109 MMOL/L (ref 96–106)
CHLORIDE SERPL-SCNC: 110 MMOL/L (ref 97–108)
CO2 SERPL-SCNC: 19 MMOL/L (ref 20–29)
CO2 SERPL-SCNC: 26 MMOL/L (ref 21–32)
COCAINE UR QL SCN: NEGATIVE
COLOR UR: NORMAL
CREAT SERPL-MCNC: 0.9 MG/DL (ref 0.7–1.3)
CREAT SERPL-MCNC: 1.15 MG/DL (ref 0.76–1.27)
DIFFERENTIAL METHOD BLD: ABNORMAL
DRUG SCRN COMMENT,DRGCM: NORMAL
EGFR: 74 ML/MIN/1.73
EOSINOPHIL # BLD: 0.2 K/UL (ref 0–0.4)
EOSINOPHIL NFR BLD: 2 % (ref 0–7)
ERYTHROCYTE [DISTWIDTH] IN BLOOD BY AUTOMATED COUNT: 13.2 % (ref 11.5–14.5)
GLOBULIN SER CALC-MCNC: 1.9 G/DL (ref 1.5–4.5)
GLOBULIN SER CALC-MCNC: 2.8 G/DL (ref 2–4)
GLUCOSE SERPL-MCNC: 141 MG/DL (ref 65–99)
GLUCOSE SERPL-MCNC: 176 MG/DL (ref 65–100)
GLUCOSE UR STRIP.AUTO-MCNC: NEGATIVE MG/DL
HCT VFR BLD AUTO: 37.8 % (ref 36.6–50.3)
HGB BLD-MCNC: 12.8 G/DL (ref 12.1–17)
HGB UR QL STRIP: NEGATIVE
IMM GRANULOCYTES # BLD AUTO: 0 K/UL (ref 0–0.04)
IMM GRANULOCYTES NFR BLD AUTO: 1 % (ref 0–0.5)
KETONES UR QL STRIP.AUTO: NEGATIVE MG/DL
LEUKOCYTE ESTERASE UR QL STRIP.AUTO: NEGATIVE
LYMPHOCYTES # BLD: 1.2 K/UL (ref 0.8–3.5)
LYMPHOCYTES NFR BLD: 15 % (ref 12–49)
MCH RBC QN AUTO: 29.8 PG (ref 26–34)
MCHC RBC AUTO-ENTMCNC: 33.9 G/DL (ref 30–36.5)
MCV RBC AUTO: 88.1 FL (ref 80–99)
METHADONE UR QL: NEGATIVE
MONOCYTES # BLD: 0.6 K/UL (ref 0–1)
MONOCYTES NFR BLD: 9 % (ref 5–13)
NEUTS SEG # BLD: 5.5 K/UL (ref 1.8–8)
NEUTS SEG NFR BLD: 73 % (ref 32–75)
NITRITE UR QL STRIP.AUTO: NEGATIVE
NRBC # BLD: 0 K/UL (ref 0–0.01)
NRBC BLD-RTO: 0 PER 100 WBC
OPIATES UR QL: NEGATIVE
PCP UR QL: NEGATIVE
PH UR STRIP: 7 [PH] (ref 5–8)
PLATELET # BLD AUTO: 189 K/UL (ref 150–400)
PMV BLD AUTO: 10 FL (ref 8.9–12.9)
POTASSIUM SERPL-SCNC: 3.4 MMOL/L (ref 3.5–5.1)
POTASSIUM SERPL-SCNC: 3.9 MMOL/L (ref 3.5–5.2)
PROT SERPL-MCNC: 6.2 G/DL (ref 6–8.5)
PROT SERPL-MCNC: 6.3 G/DL (ref 6.4–8.2)
PROT UR STRIP-MCNC: NEGATIVE MG/DL
RBC # BLD AUTO: 4.29 M/UL (ref 4.1–5.7)
RBC #/AREA URNS HPF: NORMAL /HPF (ref 0–5)
SALICYLATES SERPL-MCNC: <1.7 MG/DL (ref 2.8–20)
SODIUM SERPL-SCNC: 141 MMOL/L (ref 136–145)
SODIUM SERPL-SCNC: 143 MMOL/L (ref 134–144)
SP GR UR REFRACTOMETRY: 1 (ref 1–1.03)
UROBILINOGEN UR QL STRIP.AUTO: 0.1 EU/DL (ref 0.1–1)
WBC # BLD AUTO: 7.6 K/UL (ref 4.1–11.1)
WBC URNS QL MICRO: NORMAL /HPF (ref 0–4)

## 2022-06-01 PROCEDURE — 87636 SARSCOV2 & INF A&B AMP PRB: CPT

## 2022-06-01 PROCEDURE — 36415 COLL VENOUS BLD VENIPUNCTURE: CPT

## 2022-06-01 PROCEDURE — 80053 COMPREHEN METABOLIC PANEL: CPT

## 2022-06-01 PROCEDURE — 85025 COMPLETE CBC W/AUTO DIFF WBC: CPT

## 2022-06-01 PROCEDURE — 99283 EMERGENCY DEPT VISIT LOW MDM: CPT

## 2022-06-01 PROCEDURE — 80143 DRUG ASSAY ACETAMINOPHEN: CPT

## 2022-06-01 PROCEDURE — 80307 DRUG TEST PRSMV CHEM ANLYZR: CPT

## 2022-06-01 PROCEDURE — 81003 URINALYSIS AUTO W/O SCOPE: CPT

## 2022-06-01 PROCEDURE — 80179 DRUG ASSAY SALICYLATE: CPT

## 2022-06-02 LAB
FLUAV RNA SPEC QL NAA+PROBE: NOT DETECTED
FLUBV RNA SPEC QL NAA+PROBE: NOT DETECTED
SARS-COV-2, COV2: NOT DETECTED

## 2022-06-02 NOTE — ED TRIAGE NOTES
Brought to ED by brother. States pt has been aggressive and trying to hurt himself.  Pt recently had cogentin medications changed from generic to brand name, brother states this has been happening since then

## 2022-06-02 NOTE — BSMART NOTE
Comprehensive Assessment Form Part 1      Section I - Disposition    Schizophrenia      The Medical Doctor to Psychiatrist conference was not completed. The Medical Doctor is in agreement with Psychiatrist disposition because of (reason) patient does not meet criteria for vol inpatient admission. The plan is medical clearance and discharge. The on-call Psychiatrist consulted was Dr. Ori Azar. The admitting Psychiatrist will be Dr. Ori Azar. The admitting Diagnosis is n/a. The Payor source is Medicare [id number]. This writer reviewed the [address] in nursing flowsheet and the risk level assigned is low  risk. Based on this assessment, the risk of suicide is low risk and the plan is medical clearance and discharge. Section II - Integrated Summary  Summary:  Patient seen in room 23 of the ER. Patient dressed in a green gown, brother at bedside, belongings removed, and room secured for safety; sitter assigned. Patient is alert and oriented; and has calm behaviors. Patient is engaging in the assessment in the second half of the assessment as his brother initially spoke during the first half. Brother Malena Jr Christopher reported that yesterday the began punching himself in the head, tried to poke his eyes out with his fingers (left a hailee by the bridge of his nose) broke his speakers and hurt his leg. His stated that patient last saw a provider at Dr Christelle Degroot office on May 17 and the only change in his medication was from a generic form of Cogentin 1mg (4 tablets a day) to a brand name medication that is new on the market. Brother reported that the provider noted the medication is supposed to be better than the generic and they received samples of the medication but in the last 2 days, patient has begun to show aggression towards self.  Brother noted that patient is on the following other medications but not changes have been made to them: Prozac 20 mg (3 times daily), Risperdal 3mg (2 times daily) Lithium  mg (3 tablets daily, Bupropion  mg (1 tablet daily) and Haloperidol 1.25 mg (1 time daily). Brother stated that with the behaviors that patient was displaying in the past 2 days, he does not feel comfortable taking him home. He shared that he did not have time to call the prescribing doctor as he initially thought the behaviors would cease day 1 but then by day 2, he just drove him straight to the ER. Patient was quiet in the beginning of the assessment but later engaged. Patient denied s/i or s/i. He also denied ah/vh. Patient states that he does recall having some behaviors over the past few days but feels normal now. He stated that he is unsure what made him act the way he did but said it was scary. Patient stated that he feels that he can be safe at home and is willing to have his brother call his psychiatrist to see about getting an emergency appointment for a medication check as early as they open in the a.m. His brother is receptive to this plan. At this time, patient does not meet criteria for vol inpatient and the plan is for patient to medically clear and discharge home to follow up with is provider in the a.m. He and his brother are aware that he can return to the ER should behaviors warrant. The patienthas demonstrated mental capacity to provide informed consent. The information is given by the patient and relative(s). The Chief Complaint is aggressive behaviors towards self. The Precipitant Factors are medication change. Previous Hospitalizations: 1 year ago [facility]  The patient has not previously been in restraints. Current Psychiatrist and/or  is Dr Poli Irby. Lethality Assessment:    The potential for suicide noted by the following: not noted . The potential for homicide is not noted. The patient has not been a perpetrator of sexual or physical abuse. There are not pending charges. The patient is not felt to be at risk for self harm or harm to others. The attending nurse was advised to remove potentially harmful or dangerous items from the patient's room , to remove patient clothing and place it out of immediate access to the patient and the patient needs supervision. Section III - Psychosocial  The patient's overall mood and attitude is calm and cooperative. Feelings of helplessness and hopelessness are not observed. Generalized anxiety is not observed. Panic is not observed. Phobias are not observed. Obsessive compulsive tendencies are not observed. Section IV - Mental Status Exam  The patient's appearance shows no evidence of impairment. The patient's behavior shows no evidence of impairment. The patient is oriented to time, place, person and situation. The patient's speech shows no evidence of impairment. The patient's mood is euthymic. The range of affect shows no evidence of impairment. The patient's thought content demonstrates no evidence of impairment. The thought process shows no evidence of impairment. The patient's perception shows no evidence of impairment. The patient's memory shows no evidence of impairment. The patient's appetite shows no evidence of impairment. The patient's sleep shows no evidence of impairment. The patient's insight shows no evidence of impairment. The patient's judgement shows no evidence of impairment. Section V - Substance Abuse  The patient is not using substances. Section VI - Living Arrangements  The patient is single. The patient lives with a caregiver. The patient has no children. The patient does plan to return home upon discharge. The patient does not have legal issues pending. The patient's source of income comes from disability. Catholic and cultural practices have not been voiced at this time. The patient's greatest support comes from family and this person will be involved with the treatment.     The patient has not been in an event described as horrible or outside the realm of ordinary life experience either currently or in the past.  The patient has not been a victim of sexual/physical abuse. Section VII - Other Areas of Clinical Concern  The highest grade achieved is unknown with the overall quality of school experience being described as unknown. The patient is currently disabled and speaks [place] as a primary language. The patient has no communication impairments affecting communication. The patient's preference for learning can be described as: can read and write adequately.   The patient's hearing is normal.  The patient's vision is normal.      [facility]

## 2022-06-02 NOTE — ED PROVIDER NOTES
[id number]  EMERGENCY DEPARTMENT ENCOUNTER NOTE    Date: 6/1/2022  Patient Name: Ailyn Rebolledo [facility]      History of Presenting Illness     Chief Complaint   Patient presents with    Mental Health Problem       History Provided By: Patient    HPI: [de-identified] T Candi, 60 y.o. male with PMH of anxiety, depression, schizophrenia presents to the ED by brother with 2 days history of aggressive behavior at home. Family, patient had his Cogentin switched to a new brand and his brother noted that the last 2 days patient to be having self injury tendencies in which she was hitting his hea his arm few times over the last couple of days. .  No LOC. Not using any other objects to harm himself. Patient caregiver got concerned and brought him to the ED. No aggressive behavior toward others. Patient caregiver reports that his medications are still at home and he has all of them. Other changes in his medications recently. Otherwise, no changes and his dietary, urinary, or bowel habits. No known sick contacts and is vaccine against COVID. Primary caregiver his brother:  Sriram Holden [facility]    There are no other complaints, changes, or physical findings at this time. PCP: Mercy Sidhu NP    Current Facility-Administered Medications   Medication Dose Route Frequency Provider Last Rate Last Admin    haloperidol decanoate (HALDOL DECANOATE) 100 mg/mL LA injection 100 mg  100 mg IntraMUSCular Q 14 DAYS Mercy Sidhu NP   100 mg at 05/31/22 1500     Current Outpatient Medications   Medication Sig Dispense Refill    haloperidol decanoate (HALDOL DECANOATE) 100 mg/mL injection INJECT 100 MG (1 ML) INTRAMUSCULARLY EVERY 14 DAYS. 1 mL 5    haloperidoL (HALDOL) 5 mg tablet Take 1.25 mg by mouth daily.  lithium carbonate SR (LITHOBID) 300 mg CR tablet Take 900 mg by mouth daily.  benztropine (COGENTIN) 1 mg tablet Take 2 Tablets by mouth two (2) times a day.       cephALEXin (KEFLEX) 500 mg capsule Take 1 tab by mouth every 6 hours (4 x day) for 10 days for skin infection. 40 Capsule 0    buPROPion XL (WELLBUTRIN XL) 150 mg tablet Take 1 Tablet by mouth nightly. 30 Tablet 0    FLUoxetine (PROzac) 40 mg capsule Take 1 Capsule by mouth daily. 30 Capsule 0    risperiDONE (RisperDAL) 3 mg tablet Take 1 Tablet by mouth two (2) times a day. 60 Tablet 0    FLUoxetine (PROzac) 20 mg capsule Take 1 Capsule by mouth nightly. 30 Capsule 0       Past History     Past Medical History:  Past Medical History:   Diagnosis Date    Anxiety     Depression     Intellectual disability     Schizoaffective disorder, bipolar type ([facility] [place] [address]     Schizophrenia ([facility] [place] [address]        Past Surgical History:  No past surgical history on file. Family History:  Family History   Problem Relation Age of Onset    Cancer Father         bone    Pancreatic Cancer Father     Dementia Mother        Social History:  Social History     Tobacco Use    Smoking status: Never Smoker    Smokeless tobacco: Never Used   Vaping Use    Vaping Use: Never used   Substance Use Topics    Alcohol use: Never    Drug use: Never       Allergies:  No Known Allergies      Review of Systems     Review of Systems     A 10 point review of system was performed was negative except as noted above in HPI    Physical Exam     Physical Exam  Vitals and nursing note reviewed. Constitutional:       General: He is not in acute distress. Appearance: He is not ill-appearing, toxic-appearing or diaphoretic. HENT:      Head: Normocephalic and atraumatic. Cardiovascular:      Rate and Rhythm: Normal rate and regular rhythm. Heart sounds: Normal heart sounds. Pulmonary:      Effort: Pulmonary effort is normal.      Breath sounds: Normal breath sounds. Abdominal:      Palpations: Abdomen is soft. Tenderness: There is no abdominal tenderness. Musculoskeletal:      Cervical back: Normal range of motion and neck supple. Right lower leg: No tenderness. No edema. Left lower leg: No tenderness. No edema. Skin:     General: Skin is warm and dry. Neurological:      Mental Status: He is alert and oriented to person, place, and time. Lab and Diagnostic Study Results     Labs -     Recent Results (from the past 12 hour(s))   CBC WITH AUTOMATED DIFF    Collection Time: 06/01/22  9:30 PM   Result Value Ref Range    WBC 7.6 4.1 - 11.1 K/uL    RBC 4.29 4. 10 - 5.70 M/uL    HGB 12.8 12.1 - 17.0 g/dL    HCT 37.8 36.6 - 50.3 %    MCV 88.1 80.0 - 99.0 FL    MCH 29.8 26.0 - 34.0 PG    MCHC 33.9 30.0 - 36.5 g/dL    RDW 13.2 11.5 - 14.5 %    PLATELET 948 472 - 330 K/uL    MPV 10.0 8.9 - 12.9 FL    NRBC 0.0 0.0  WBC    ABSOLUTE NRBC 0.00 0.00 - 0.01 K/uL    NEUTROPHILS 73 32 - 75 %    LYMPHOCYTES 15 12 - 49 %    MONOCYTES 9 5 - 13 %    EOSINOPHILS 2 0 - 7 %    BASOPHILS 0 0 - 1 %    IMMATURE GRANULOCYTES 1 (H) 0 - 0.5 %    ABS. NEUTROPHILS 5.5 1.8 - 8.0 K/UL    ABS. LYMPHOCYTES 1.2 0.8 - 3.5 K/UL    ABS. MONOCYTES 0.6 0.0 - 1.0 K/UL    ABS. EOSINOPHILS 0.2 0.0 - 0.4 K/UL    ABS. BASOPHILS 0.0 0.0 - 0.1 K/UL    ABS. IMM. GRANS. 0.0 0.00 - 0.04 K/UL    DF AUTOMATED     METABOLIC PANEL, COMPREHENSIVE    Collection Time: 06/01/22  9:30 PM   Result Value Ref Range    Sodium 141 136 - 145 mmol/L    Potassium 3.4 (L) 3.5 - 5.1 mmol/L    Chloride 110 (H) 97 - 108 mmol/L    CO2 26 21 - 32 mmol/L    Anion gap 5 5 - 15 mmol/L    Glucose 176 (H) 65 - 100 mg/dL    BUN 13 6 - 20 mg/dL    Creatinine 0.90 0.70 - 1.30 mg/dL    BUN/Creatinine ratio 14 12 - 20      GFR est AA >60 >60 ml/min/1.73m2    GFR est non-AA >60 >60 ml/min/1.73m2    Calcium 8.6 8.5 - 10.1 mg/dL    Bilirubin, total 0.2 0.2 - 1.0 mg/dL    AST (SGOT) 20 15 - 37 U/L    ALT (SGPT) 34 12 - 78 U/L    Alk.  phosphatase 56 45 - 117 U/L    Protein, total 6.3 (L) 6.4 - 8.2 g/dL    Albumin 3.5 3.5 - 5.0 g/dL    Globulin 2.8 2.0 - 4.0 g/dL    A-G Ratio 1.3 1.1 - 2.2     DRUG SCREEN, URINE    Collection Time: 06/01/22  9:30 PM Result Value Ref Range    AMPHETAMINES Negative Negative      BARBITURATES Negative Negative      BENZODIAZEPINES Negative Negative      COCAINE Negative Negative      METHADONE Negative Negative      OPIATES Negative Negative      PCP(PHENCYCLIDINE) Negative Negative      THC (TH-CANNABINOL) Negative Negative      Drug screen comment        This test is a screen for drugs of abuse in a medical setting only (i.e., they are unconfirmed results and as such must not be used for non-medical purposes, e.g.,employment testing, legal testing). Due to its inherent nature, false positive (FP) and false negative (FN) results may be obtained. Therefore, if necessary for medical care, recommend confirmation of positive findings by GC/MS.    URINALYSIS W/ RFLX MICROSCOPIC    Collection Time: 06/01/22  9:30 PM   Result Value Ref Range    Color Yellow/Straw      Appearance Clear Clear      Specific gravity 1.005 1.003 - 1.030      pH (UA) 7.0 5.0 - 8.0      Protein Negative Negative mg/dL    Glucose Negative Negative mg/dL    Ketone Negative Negative mg/dL    Bilirubin Negative Negative      Blood Negative Negative      Urobilinogen 0.1 0.1 - 1.0 EU/dL    Nitrites Negative Negative      Leukocyte Esterase Negative Negative      WBC 0-4 0 - 4 /hpf    RBC 0-5 0 - 5 /hpf    Bacteria Negative Negative /hpf   ACETAMINOPHEN    Collection Time: 06/01/22  9:30 PM   Result Value Ref Range    Acetaminophen level <10 (L) 10 - 30 ug/mL   SALICYLATE    Collection Time: 06/01/22  9:30 PM   Result Value Ref Range    Salicylate level <3.1 (L) 2.8 - 20.0 mg/dL   COVID-19 WITH INFLUENZA A/B    Collection Time: 06/01/22 10:35 PM   Result Value Ref Range    SARS-CoV-2 by PCR Not Detected Not Detected      Influenza A by PCR Not Detected Not Detected      Influenza B by PCR Not Detected Not Detected         Radiologic Studies -   [unfilled]  CT Results  (Last 48 hours)    None        CXR Results  (Last 48 hours)    None          Medical Decision Making and ED Course   - I am the first and primary provider for this patient AND AM THE PRIMARY PROVIDER OF RECORD. - I reviewed the vital signs, available nursing notes, past medical history, past surgical history, family history and social history. - Initial assessment performed. The patients presenting problems have been discussed, and the staff are in agreement with the care plan formulated and outlined with them. I have encouraged them to ask questions as they arise throughout their visit. Vital Signs-Reviewed the patient's vital signs. Patient Vitals for the past 24 hrs:   Temp Pulse Resp BP SpO2   06/01/22 2111 97.6 °F (36.4 °C) 73 18 126/80 96 %       Records Reviewed: Nursing Notes    Provider Notes (Medical Decision Making):     Patient presented to the ED with recent medication change with concern for aggressive behavior at home. Mild self injury behavior. No signs of trauma appreciated on evaluation. In the ED, patient appears very pleasant, cooperative and following commands. No suicidal homicidal ideation. His work-up in the ED is reviewed and negative for any acute or chronic etiology. I did get the behavioral health specialist who come to evaluate the patient and patient is deemed to be safe to be discharged home. He does have an established follow-up with psychiatry as an outpatient and the patient's brother will be able to help arrange expedited follow-up and contact his psychiatrist to discuss this change in his behavior since his medication change. Other than that, I did not appreciate any signs of trauma that warrant further work-up in the ED. Also, patient does not have any other organic complaints that may be the cause of his visit to the ED. Thus, counseled on follow-up as an outpatient.  guidance return precaution discussed. At the time of discharge, patient appears clinically well, neurologically stable, and appropriate for discharge.       Disposition Disposition: Condition stable  DC- Adult Discharges: All of the diagnostic tests were reviewed and questions answered. Diagnosis, care plan and treatment options were discussed. The patient understands the instructions and will follow up as directed. The patients results have been reviewed with them. They have been counseled regarding their diagnosis. The patient and caregiver verbally convey understanding and agreement of the signs, symptoms, diagnosis, treatment and prognosis and additionally agrees to follow up as recommended with their PCP in 24 - 48 hours. They also agree with the care-plan and convey that all of their questions have been answered. I have also put together some discharge instructions for them that include: 1) educational information regarding their diagnosis, 2) how to care for their diagnosis at home, as well a 3) list of reasons why they would want to return to the ED prior to their follow-up appointment, should their condition change. Discharged      DISCHARGE PLAN:  1. Current Discharge Medication List      CONTINUE these medications which have NOT CHANGED    Details   haloperidol decanoate (HALDOL DECANOATE) 100 mg/mL injection INJECT 100 MG (1 ML) INTRAMUSCULARLY EVERY 14 DAYS. Qty: 1 mL, Refills: 5    Associated Diagnoses: Paranoid schizophrenia (HCC)      haloperidoL (HALDOL) 5 mg tablet Take 1.25 mg by mouth daily. lithium carbonate SR (LITHOBID) 300 mg CR tablet Take 900 mg by mouth daily. benztropine (COGENTIN) 1 mg tablet Take 2 Tablets by mouth two (2) times a day. cephALEXin (KEFLEX) 500 mg capsule Take 1 tab by mouth every 6 hours (4 x day) for 10 days for skin infection. Qty: 40 Capsule, Refills: 0    Associated Diagnoses: Cellulitis of buttock      buPROPion XL (WELLBUTRIN XL) 150 mg tablet Take 1 Tablet by mouth nightly. Qty: 30 Tablet, Refills: 0      !! FLUoxetine (PROzac) 40 mg capsule Take 1 Capsule by mouth daily.   Qty: 30 Capsule, Refills: 0      risperiDONE (RisperDAL) 3 mg tablet Take 1 Tablet by mouth two (2) times a day. Qty: 60 Tablet, Refills: 0      !! FLUoxetine (PROzac) 20 mg capsule Take 1 Capsule by mouth nightly. Qty: 30 Capsule, Refills: 0       !! - Potential duplicate medications found. Please discuss with provider. 2.   Follow-up Information     Follow up With Specialties Details Why [address] EMERGENCY DEPT Emergency Medicine Go to  As needed, If symptoms worsen [address] [id number]  [phone]    Psychiatrist  Call today For reevaluation, Discuss your visit to the ER         3. Return to ED if worse   4. Discharge Medication List as of 6/2/2022  4:48 AM          Diagnosis     Clinical Impression:   1. Aggressive behavior        Attestations: Jasson Herrera MD    Please note that this dictation was completed with [employer]aze, the computer voice recognition software. Quite often unanticipated grammatical, syntax, homophones, and other interpretive errors are inadvertently transcribed by the computer software. Please disregard these errors. Please excuse any errors that have escaped final proofreading. Thank you.

## 2022-06-02 NOTE — DISCHARGE INSTRUCTIONS
Thank you! Thank you for allowing me to care for you in the emergency department. I sincerely hope that you are satisfied with your visit today. It is my goal to provide you with excellent care. Below you will find a list of your labs and imaging from your visit today. Should you have any questions regarding these results please do not hesitate to call the emergency department. Labs -     Recent Results (from the past 12 hour(s))   CBC WITH AUTOMATED DIFF    Collection Time: 06/01/22  9:30 PM   Result Value Ref Range    WBC 7.6 4.1 - 11.1 K/uL    RBC 4.29 4. 10 - 5.70 M/uL    HGB 12.8 12.1 - 17.0 g/dL    HCT 37.8 36.6 - 50.3 %    MCV 88.1 80.0 - 99.0 FL    MCH 29.8 26.0 - 34.0 PG    MCHC 33.9 30.0 - 36.5 g/dL    RDW 13.2 11.5 - 14.5 %    PLATELET 753 013 - 760 K/uL    MPV 10.0 8.9 - 12.9 FL    NRBC 0.0 0.0  WBC    ABSOLUTE NRBC 0.00 0.00 - 0.01 K/uL    NEUTROPHILS 73 32 - 75 %    LYMPHOCYTES 15 12 - 49 %    MONOCYTES 9 5 - 13 %    EOSINOPHILS 2 0 - 7 %    BASOPHILS 0 0 - 1 %    IMMATURE GRANULOCYTES 1 (H) 0 - 0.5 %    ABS. NEUTROPHILS 5.5 1.8 - 8.0 K/UL    ABS. LYMPHOCYTES 1.2 0.8 - 3.5 K/UL    ABS. MONOCYTES 0.6 0.0 - 1.0 K/UL    ABS. EOSINOPHILS 0.2 0.0 - 0.4 K/UL    ABS. BASOPHILS 0.0 0.0 - 0.1 K/UL    ABS. IMM. GRANS. 0.0 0.00 - 0.04 K/UL    DF AUTOMATED     METABOLIC PANEL, COMPREHENSIVE    Collection Time: 06/01/22  9:30 PM   Result Value Ref Range    Sodium 141 136 - 145 mmol/L    Potassium 3.4 (L) 3.5 - 5.1 mmol/L    Chloride 110 (H) 97 - 108 mmol/L    CO2 26 21 - 32 mmol/L    Anion gap 5 5 - 15 mmol/L    Glucose 176 (H) 65 - 100 mg/dL    BUN 13 6 - 20 mg/dL    Creatinine 0.90 0.70 - 1.30 mg/dL    BUN/Creatinine ratio 14 12 - 20      GFR est AA >60 >60 ml/min/1.73m2    GFR est non-AA >60 >60 ml/min/1.73m2    Calcium 8.6 8.5 - 10.1 mg/dL    Bilirubin, total 0.2 0.2 - 1.0 mg/dL    AST (SGOT) 20 15 - 37 U/L    ALT (SGPT) 34 12 - 78 U/L    Alk.  phosphatase 56 45 - 117 U/L    Protein, total 6.3 (L) 6.4 - 8.2 g/dL    Albumin 3.5 3.5 - 5.0 g/dL    Globulin 2.8 2.0 - 4.0 g/dL    A-G Ratio 1.3 1.1 - 2.2     DRUG SCREEN, URINE    Collection Time: 06/01/22  9:30 PM   Result Value Ref Range    AMPHETAMINES Negative Negative      BARBITURATES Negative Negative      BENZODIAZEPINES Negative Negative      COCAINE Negative Negative      METHADONE Negative Negative      OPIATES Negative Negative      PCP(PHENCYCLIDINE) Negative Negative      THC (TH-CANNABINOL) Negative Negative      Drug screen comment        This test is a screen for drugs of abuse in a medical setting only (i.e., they are unconfirmed results and as such must not be used for non-medical purposes, e.g.,employment testing, legal testing). Due to its inherent nature, false positive (FP) and false negative (FN) results may be obtained. Therefore, if necessary for medical care, recommend confirmation of positive findings by GC/MS.    URINALYSIS W/ RFLX MICROSCOPIC    Collection Time: 06/01/22  9:30 PM   Result Value Ref Range    Color Yellow/Straw      Appearance Clear Clear      Specific gravity 1.005 1.003 - 1.030      pH (UA) 7.0 5.0 - 8.0      Protein Negative Negative mg/dL    Glucose Negative Negative mg/dL    Ketone Negative Negative mg/dL    Bilirubin Negative Negative      Blood Negative Negative      Urobilinogen 0.1 0.1 - 1.0 EU/dL    Nitrites Negative Negative      Leukocyte Esterase Negative Negative      WBC 0-4 0 - 4 /hpf    RBC 0-5 0 - 5 /hpf    Bacteria Negative Negative /hpf   ACETAMINOPHEN    Collection Time: 06/01/22  9:30 PM   Result Value Ref Range    Acetaminophen level <10 (L) 10 - 30 ug/mL   SALICYLATE    Collection Time: 06/01/22  9:30 PM   Result Value Ref Range    Salicylate level <0.8 (L) 2.8 - 20.0 mg/dL   COVID-19 WITH INFLUENZA A/B    Collection Time: 06/01/22 10:35 PM   Result Value Ref Range    SARS-CoV-2 by PCR Not Detected Not Detected      Influenza A by PCR Not Detected Not Detected      Influenza B by PCR Not Detected Not Detected         Radiologic Studies -   No orders to display     CT Results  (Last 48 hours)      None          CXR Results  (Last 48 hours)      None               If you feel that you have not received excellent quality care or timely care, please ask to speak to the nurse manager. Please choose us in the future for your continued health care needs. ------------------------------------------------------------------------------------------------------------  The exam and treatment you received in the Emergency Department were for an urgent problem and are not intended as complete care. It is important that you follow-up with a doctor, nurse practitioner, or physician assistant to:  (1) confirm your diagnosis,  (2) re-evaluation of changes in your illness and treatment, and  (3) for ongoing care. If your symptoms become worse or you do not improve as expected and you are unable to reach your usual health care provider, you should return to the Emergency Department. We are available 24 hours a day. Please take your discharge instructions with you when you go to your follow-up appointment. If you have any problem arranging a follow-up appointment, contact the Emergency Department immediately. If a prescription has been provided, please have it filled as soon as possible to prevent a delay in treatment. Read the entire medication instruction sheet provided to you by the pharmacy. If you have any questions or reservations about taking the medication due to side effects or interactions with other medications, please call your primary care physician or contact the ER to speak with the charge nurse. Make an appointment with your family doctor or the physician you were referred to for follow-up of this visit as instructed on your discharge paperwork, as this is a mandatory follow-up. Return to the ER if you are unable to be seen or if you are unable to be seen in a timely manner.     If you have any problem arranging the follow-up visit, contact the Emergency Department immediately.

## 2022-06-02 NOTE — BSMART NOTE
BSMART assessment completed, and suicide risk level noted to be low. Primary Nurse Marsha Rosa and Charge Nurse Magali Regalado and Physician Dr. Armenta Duty notified. Concerns not observed. Security/Off- has not been notified.     Fredy Wilson, [facility]

## 2022-06-14 ENCOUNTER — CLINICAL SUPPORT (OUTPATIENT)
Dept: FAMILY MEDICINE CLINIC | Age: 57
End: 2022-06-14
Payer: MEDICARE

## 2022-06-14 DIAGNOSIS — F20.0 PARANOID SCHIZOPHRENIA (HCC): Primary | ICD-10-CM

## 2022-06-14 RX ADMIN — HALOPERIDOL DECANOATE 100 MG: 100 INJECTION INTRAMUSCULAR at 15:39

## 2022-06-29 ENCOUNTER — CLINICAL SUPPORT (OUTPATIENT)
Dept: FAMILY MEDICINE CLINIC | Age: 57
End: 2022-06-29
Payer: MEDICARE

## 2022-06-29 DIAGNOSIS — F20.0 PARANOID SCHIZOPHRENIA (HCC): Primary | ICD-10-CM

## 2022-06-29 PROCEDURE — 96372 THER/PROPH/DIAG INJ SC/IM: CPT | Performed by: NURSE PRACTITIONER

## 2022-06-29 RX ADMIN — HALOPERIDOL DECANOATE 100 MG: 100 INJECTION INTRAMUSCULAR at 14:49

## 2022-07-12 ENCOUNTER — CLINICAL SUPPORT (OUTPATIENT)
Dept: FAMILY MEDICINE CLINIC | Age: 57
End: 2022-07-12
Payer: MEDICARE

## 2022-07-12 DIAGNOSIS — F20.0 PARANOID SCHIZOPHRENIA (HCC): Primary | ICD-10-CM

## 2022-07-12 PROCEDURE — 96372 THER/PROPH/DIAG INJ SC/IM: CPT | Performed by: NURSE PRACTITIONER

## 2022-07-12 RX ADMIN — HALOPERIDOL DECANOATE 100 MG: 100 INJECTION INTRAMUSCULAR at 14:50

## 2022-07-26 ENCOUNTER — CLINICAL SUPPORT (OUTPATIENT)
Dept: FAMILY MEDICINE CLINIC | Age: 57
End: 2022-07-26
Payer: MEDICARE

## 2022-07-26 DIAGNOSIS — F20.9 SCHIZOPHRENIA, UNSPECIFIED TYPE (HCC): Primary | ICD-10-CM

## 2022-07-26 PROCEDURE — 96372 THER/PROPH/DIAG INJ SC/IM: CPT | Performed by: NURSE PRACTITIONER

## 2022-07-26 RX ADMIN — HALOPERIDOL DECANOATE 100 MG: 100 INJECTION INTRAMUSCULAR at 15:18

## 2022-08-09 ENCOUNTER — CLINICAL SUPPORT (OUTPATIENT)
Dept: FAMILY MEDICINE CLINIC | Age: 57
End: 2022-08-09
Payer: MEDICARE

## 2022-08-09 DIAGNOSIS — Z23 ENCOUNTER FOR IMMUNIZATION: Primary | ICD-10-CM

## 2022-08-09 PROCEDURE — 96372 THER/PROPH/DIAG INJ SC/IM: CPT | Performed by: NURSE PRACTITIONER

## 2022-08-09 RX ADMIN — HALOPERIDOL DECANOATE 100 MG: 100 INJECTION INTRAMUSCULAR at 15:10

## 2022-08-09 NOTE — PROGRESS NOTES
Pt came into for shot. Spoke with pt and pt wanted to try shot in arm.  Pt tolerated vacc in left delt good

## 2022-08-17 ENCOUNTER — OFFICE VISIT (OUTPATIENT)
Dept: FAMILY MEDICINE CLINIC | Age: 57
End: 2022-08-17
Payer: MEDICARE

## 2022-08-17 VITALS
TEMPERATURE: 98.4 F | OXYGEN SATURATION: 98 % | WEIGHT: 142 LBS | HEIGHT: 65 IN | SYSTOLIC BLOOD PRESSURE: 112 MMHG | RESPIRATION RATE: 16 BRPM | BODY MASS INDEX: 23.66 KG/M2 | DIASTOLIC BLOOD PRESSURE: 74 MMHG | HEART RATE: 66 BPM

## 2022-08-17 DIAGNOSIS — E55.9 VITAMIN D DEFICIENCY: ICD-10-CM

## 2022-08-17 DIAGNOSIS — Z51.81 THERAPEUTIC DRUG MONITORING: ICD-10-CM

## 2022-08-17 DIAGNOSIS — E87.6 HYPOKALEMIA: ICD-10-CM

## 2022-08-17 DIAGNOSIS — E11.9 TYPE 2 DIABETES MELLITUS WITHOUT COMPLICATION, WITHOUT LONG-TERM CURRENT USE OF INSULIN (HCC): Primary | ICD-10-CM

## 2022-08-17 DIAGNOSIS — E78.00 PURE HYPERCHOLESTEROLEMIA: ICD-10-CM

## 2022-08-17 PROCEDURE — 3017F COLORECTAL CA SCREEN DOC REV: CPT | Performed by: NURSE PRACTITIONER

## 2022-08-17 PROCEDURE — 99214 OFFICE O/P EST MOD 30 MIN: CPT | Performed by: NURSE PRACTITIONER

## 2022-08-17 PROCEDURE — G8420 CALC BMI NORM PARAMETERS: HCPCS | Performed by: NURSE PRACTITIONER

## 2022-08-17 PROCEDURE — G8427 DOCREV CUR MEDS BY ELIG CLIN: HCPCS | Performed by: NURSE PRACTITIONER

## 2022-08-17 PROCEDURE — G9717 DOC PT DX DEP/BP F/U NT REQ: HCPCS | Performed by: NURSE PRACTITIONER

## 2022-08-17 PROCEDURE — 2022F DILAT RTA XM EVC RTNOPTHY: CPT | Performed by: NURSE PRACTITIONER

## 2022-08-17 PROCEDURE — 3044F HG A1C LEVEL LT 7.0%: CPT | Performed by: NURSE PRACTITIONER

## 2022-08-17 NOTE — PROGRESS NOTES
Chief Complaint   Patient presents with    Follow-up    Diabetes       Visit Vitals  /74 (BP 1 Location: Left upper arm, BP Patient Position: Sitting, BP Cuff Size: Adult)   Pulse 66   Temp 98.4 °F (36.9 °C) (Temporal)   Resp 16   Ht 5' 5\" (1.651 m)   Wt 142 lb (64.4 kg)   SpO2 98%   BMI 23.63 kg/m²       1. \"Have you been to the ER, urgent care clinic since your last visit? Hospitalized since your last visit? \" No    2. \"Have you seen or consulted any other health care providers outside of the [address] [place] [address] since your last visit? \"  Dr. Raj Vigil      3. For patients aged 39-70: Has the patient had a colonoscopy / FIT/ Cologuard? No      If the patient is female:    4. For patients aged 41-77: Has the patient had a mammogram within the past 2 years? NA - based on age or sex      11. For patients aged 21-65: Has the patient had a pap smear?  NA - based on age or sex

## 2022-08-17 NOTE — PROGRESS NOTES
Chief Complaint   Patient presents with    Follow-up    Diabetes     Visit Vitals  /74 (BP 1 Location: Left upper arm, BP Patient Position: Sitting, BP Cuff Size: Adult)   Pulse 66   Temp 98.4 °F (36.9 °C) (Temporal)   Resp 16   Ht 5' 5\" (1.651 m)   Wt 142 lb (64.4 kg)   SpO2 98%   BMI 23.63 kg/m²     Subjective  Shirley Christopher is a 60 y.o. male. Patient returns for follow-up. Accompanied by his brother who is his caretaker. Brother provided 100% of the information for HPI. Reviewed all results of recent lab work with patient's brother. Medical hx significant for schizophrenia, tremor, controlled type 2 diabetes mellitus, memory impairment, gait disturbance, depression, anxiety, and intellectual disability. Pt is under the care of a psychiatrist for his psychiatric issues and has regular follow-up for medication management. Recently patient has been acting more aggressively and has been harder to handle. Brother informed me of recent medication changes and I have updated his medication record. Every 2 weeks he is receiving Haldol 100 mg IM and he is also taking oral Haldol 1.25 mg daily. Brother stated patient is calmer now. Type 2 diabetes mellitus is well controlled on no medications. Last A1c was in normal range 5.2%. Will continue to monitor status. Patient comes in routinely every 3 months with brother letting office know of any needs. I will put in lab work order so patient can do lab work prior to his next appointment and can be discussed during the appointment. Patient Active Problem List   Diagnosis Code    Schizophrenia ([facility] [address] F20.9    Tremor R25.1    Type 2 diabetes mellitus without complication (HCC) A62.1    Memory impairment R41.3    Gait disturbance R26.9    Depression with suicidal ideation F32. A, R45.851    Vitamin D deficiency E55.9     Past Medical History:   Diagnosis Date    Anxiety     Depression     Intellectual disability     Schizoaffective disorder, bipolar type ([facility] [address] [facility]      History reviewed. No pertinent surgical history. Current Outpatient Medications   Medication Instructions    benztropine (COGENTIN) 1 mg tablet 2 Tablets, Oral, 2 TIMES DAILY, Taking 3mg Q AM and 1.5mg QPM    buPROPion XL (WELLBUTRIN XL) 150 mg, Oral, EVERY BEDTIME    haloperidoL (HALDOL) 1.25 mg, Oral, DAILY, 2.5mg daily    haloperidol decanoate (HALDOL DECANOATE) 100 mg/mL injection INJECT 100 MG (1 ML) INTRAMUSCULARLY EVERY 14 DAYS.    lithium carbonate SR (LITHOBID) 300 mg, Oral, 3 TIMES DAILY    potassium chloride (KAON 10%) 20 mEq/15 mL solution 20 mEq, Oral, DAILY    risperiDONE (RISPERDAL) 3 mg, Oral, 2 TIMES DAILY     No Known Allergies  Social History     Tobacco Use    Smoking status: Never    Smokeless tobacco: Never   Vaping Use    Vaping Use: Never used   Substance Use Topics    Alcohol use: Never    Drug use: Never     Family History   Problem Relation Age of Onset    Cancer Father         bone    Pancreatic Cancer Father     Dementia Mother      Review of Systems   Constitutional:  Negative for chills, fever and malaise/fatigue. HENT:  Negative for congestion, ear pain and sore throat. Respiratory:  Negative for cough, shortness of breath and wheezing. Cardiovascular:  Negative for chest pain, palpitations and leg swelling. Gastrointestinal:  Negative for abdominal pain, constipation, diarrhea, heartburn, nausea and vomiting. Genitourinary:  Negative for dysuria. Musculoskeletal:  Negative for back pain, falls, joint pain, myalgias and neck pain. Neurological:  Negative for dizziness, tingling, sensory change, weakness and headaches. Psychiatric/Behavioral:  Negative for depression. The patient is not nervous/anxious and does not have insomnia. Objective  Physical Exam  Constitutional:       General: He is not in acute distress. Appearance: Normal appearance. HENT:      Head: Normocephalic.       Right Ear: External ear normal.      Left Ear: External ear normal.      Nose: Nose normal.   Eyes:      Conjunctiva/sclera: Conjunctivae normal.   Neck:      Vascular: No carotid bruit. Cardiovascular:      Rate and Rhythm: Normal rate and regular rhythm. Heart sounds: Normal heart sounds. No murmur heard. Pulmonary:      Effort: Pulmonary effort is normal. No respiratory distress. Breath sounds: Normal breath sounds. Musculoskeletal:         General: No swelling or tenderness. Normal range of motion. Cervical back: Neck supple. Right lower leg: No edema. Left lower leg: No edema. Skin:     General: Skin is warm and dry. Coloration: Skin is not jaundiced. Findings: No lesion or rash. Neurological:      General: No focal deficit present. Mental Status: He is alert. Mental status is at baseline. Motor: No weakness. Gait: Gait normal.      Comments: Patient cooperative and pleasant during encounter. He does not talk much to provider but he and his brother communicate quite well. Psychiatric:         Mood and Affect: Mood normal.         Behavior: Behavior normal.       Assessment & Plan      ICD-10-CM ICD-9-CM    1. Type 2 diabetes mellitus without complication, without long-term current use of insulin (HCC)  E11.9 250.00 HEMOGLOBIN A1C WITH EAG      MICROALBUMIN, UR, RAND W/ MICROALB/CREAT RATIO      2. Pure hypercholesterolemia  E78.00 272.0 CBC WITH AUTOMATED DIFF      RETICULOCYTE COUNT      LIPID PANEL      3. Hypokalemia  F41.1 488.7 METABOLIC PANEL, COMPREHENSIVE      potassium chloride (KAON 10%) 20 mEq/15 mL solution      4. Vitamin D deficiency  E55.9 268.9 VITAMIN D, 25 HYDROXY      5. Therapeutic drug monitoring  Z51.81 V58.83 LITHIUM          1. Type 2 diabetes mellitus without complication, without long-term current use of insulin (HCC)  Stable and well-controlled without medication. We will continue to monitor via labs.     - HEMOGLOBIN A1C WITH EAG  - MICROALBUMIN, UR, RAND W/ MICROALB/CREAT RATIO    2. Pure hypercholesterolemia  Last LDL was 113. Patient not on meds and will continue to monitor.    - CBC WITH AUTOMATED DIFF  - RETICULOCYTE COUNT  - LIPID PANEL    3. Hypokalemia  Last labs with potassium of 3.4. Will add on low-dose of potassium and continue to monitor.    - METABOLIC PANEL, COMPREHENSIVE    4. Vitamin D deficiency  Last vitamin D level I could find in his record was from June 2021 at which time it was 17.7. He is not currently on a vitamin D supplement. Likely will need to add on high-dose course when lab results come back    - VITAMIN D, 25 HYDROXY    5. Therapeutic drug monitoring  Does not appear that psychiatry is monitoring his lithium level so we will let psych know if he is not in therapeutic range.    - LITHIUM     I have discussed the diagnosis with the patient and the intended plan as seen in the above orders. Pt/caretaker has expressed understanding. Questions were answered concerning future plans. I have discussed medication side effects and warnings as indicated with the patient as well.     Timothy Velasquez NP

## 2022-08-25 ENCOUNTER — TELEPHONE (OUTPATIENT)
Dept: FAMILY MEDICINE CLINIC | Age: 57
End: 2022-08-25

## 2022-08-25 RX ORDER — POTASSIUM CHLORIDE 20MEQ/15ML
20 LIQUID (ML) ORAL DAILY
Qty: 480 ML | Refills: 3 | Status: SHIPPED | OUTPATIENT
Start: 2022-08-25

## 2022-08-25 NOTE — TELEPHONE ENCOUNTER
Please call patient's brother and inform him that I added on a little potassium chloride to his medication list because patient's last potassium was very slightly low. I ordered the liquid form which I was hoping would be easier for him to take. Let me know if's brother does not want the liquid version.   Thank you

## 2022-10-07 ENCOUNTER — CLINICAL SUPPORT (OUTPATIENT)
Dept: FAMILY MEDICINE CLINIC | Age: 57
End: 2022-10-07
Payer: MEDICARE

## 2022-10-07 DIAGNOSIS — F20.9 SCHIZOPHRENIA, UNSPECIFIED TYPE (HCC): Primary | ICD-10-CM

## 2022-10-07 DIAGNOSIS — F20.0 PARANOID SCHIZOPHRENIA (HCC): ICD-10-CM

## 2022-10-07 PROCEDURE — 96372 THER/PROPH/DIAG INJ SC/IM: CPT | Performed by: NURSE PRACTITIONER

## 2022-10-07 RX ORDER — HALOPERIDOL DECANOATE 100 MG/ML
100 INJECTION INTRAMUSCULAR
Status: COMPLETED | OUTPATIENT
Start: 2022-10-07 | End: 2022-10-07

## 2022-10-07 RX ADMIN — HALOPERIDOL DECANOATE 100 MG: 100 INJECTION INTRAMUSCULAR at 10:40

## 2022-10-09 ENCOUNTER — APPOINTMENT (OUTPATIENT)
Dept: CT IMAGING | Age: 57
End: 2022-10-09
Attending: EMERGENCY MEDICINE
Payer: MEDICARE

## 2022-10-09 ENCOUNTER — HOSPITAL ENCOUNTER (EMERGENCY)
Age: 57
Discharge: HOME | End: 2022-10-09
Attending: EMERGENCY MEDICINE
Payer: MEDICARE

## 2022-10-09 VITALS
RESPIRATION RATE: 18 BRPM | WEIGHT: 140 LBS | OXYGEN SATURATION: 97 % | HEIGHT: 67 IN | HEART RATE: 66 BPM | SYSTOLIC BLOOD PRESSURE: 120 MMHG | BODY MASS INDEX: 21.97 KG/M2 | TEMPERATURE: 98.7 F | DIASTOLIC BLOOD PRESSURE: 84 MMHG

## 2022-10-09 DIAGNOSIS — K52.9 GASTROENTERITIS: ICD-10-CM

## 2022-10-09 DIAGNOSIS — R10.33 PERIUMBILICAL PAIN: Primary | ICD-10-CM

## 2022-10-09 LAB
ALBUMIN SERPL-MCNC: 3.6 G/DL (ref 3.5–5)
ALBUMIN/GLOB SERPL: 1.1 {RATIO} (ref 1.1–2.2)
ALP SERPL-CCNC: 68 U/L (ref 45–117)
ALT SERPL-CCNC: 42 U/L (ref 12–78)
ANION GAP SERPL CALC-SCNC: 5 MMOL/L (ref 5–15)
AST SERPL W P-5'-P-CCNC: 29 U/L (ref 15–37)
BASOPHILS # BLD: 0 K/UL (ref 0–0.1)
BASOPHILS NFR BLD: 0 % (ref 0–1)
BILIRUB SERPL-MCNC: 0.6 MG/DL (ref 0.2–1)
BNP SERPL-MCNC: 33 PG/ML
BUN SERPL-MCNC: 14 MG/DL (ref 6–20)
BUN/CREAT SERPL: 14 (ref 12–20)
CA-I BLD-MCNC: 8.9 MG/DL (ref 8.5–10.1)
CHLORIDE SERPL-SCNC: 109 MMOL/L (ref 97–108)
CO2 SERPL-SCNC: 25 MMOL/L (ref 21–32)
CREAT SERPL-MCNC: 0.97 MG/DL (ref 0.7–1.3)
DIFFERENTIAL METHOD BLD: ABNORMAL
EOSINOPHIL # BLD: 0.1 K/UL (ref 0–0.4)
EOSINOPHIL NFR BLD: 2 % (ref 0–7)
ERYTHROCYTE [DISTWIDTH] IN BLOOD BY AUTOMATED COUNT: 13.1 % (ref 11.5–14.5)
GLOBULIN SER CALC-MCNC: 3.3 G/DL (ref 2–4)
GLUCOSE SERPL-MCNC: 147 MG/DL (ref 65–100)
HCT VFR BLD AUTO: 40.5 % (ref 36.6–50.3)
HGB BLD-MCNC: 13.6 G/DL (ref 12.1–17)
IMM GRANULOCYTES # BLD AUTO: 0 K/UL (ref 0–0.04)
IMM GRANULOCYTES NFR BLD AUTO: 0 % (ref 0–0.5)
LIPASE SERPL-CCNC: 122 U/L (ref 73–393)
LYMPHOCYTES # BLD: 0.8 K/UL (ref 0.8–3.5)
LYMPHOCYTES NFR BLD: 11 % (ref 12–49)
MCH RBC QN AUTO: 29.6 PG (ref 26–34)
MCHC RBC AUTO-ENTMCNC: 33.6 G/DL (ref 30–36.5)
MCV RBC AUTO: 88.2 FL (ref 80–99)
MONOCYTES # BLD: 0.6 K/UL (ref 0–1)
MONOCYTES NFR BLD: 8 % (ref 5–13)
NEUTS SEG # BLD: 5.8 K/UL (ref 1.8–8)
NEUTS SEG NFR BLD: 79 % (ref 32–75)
NRBC # BLD: 0 K/UL (ref 0–0.01)
NRBC BLD-RTO: 0 PER 100 WBC
PLATELET # BLD AUTO: 231 K/UL (ref 150–400)
PMV BLD AUTO: 10 FL (ref 8.9–12.9)
POTASSIUM SERPL-SCNC: 4.6 MMOL/L (ref 3.5–5.1)
PROT SERPL-MCNC: 6.9 G/DL (ref 6.4–8.2)
RBC # BLD AUTO: 4.59 M/UL (ref 4.1–5.7)
SODIUM SERPL-SCNC: 139 MMOL/L (ref 136–145)
TROPONIN-HIGH SENSITIVITY: 7 NG/L (ref 0–76)
WBC # BLD AUTO: 7.4 K/UL (ref 4.1–11.1)

## 2022-10-09 PROCEDURE — 85025 COMPLETE CBC W/AUTO DIFF WBC: CPT

## 2022-10-09 PROCEDURE — 74177 CT ABD & PELVIS W/CONTRAST: CPT

## 2022-10-09 PROCEDURE — 99285 EMERGENCY DEPT VISIT HI MDM: CPT

## 2022-10-09 PROCEDURE — 36415 COLL VENOUS BLD VENIPUNCTURE: CPT

## 2022-10-09 PROCEDURE — 84484 ASSAY OF TROPONIN QUANT: CPT

## 2022-10-09 PROCEDURE — 74011250636 HC RX REV CODE- 250/636: Performed by: EMERGENCY MEDICINE

## 2022-10-09 PROCEDURE — 74011000636 HC RX REV CODE- 636: Performed by: EMERGENCY MEDICINE

## 2022-10-09 PROCEDURE — 74011250637 HC RX REV CODE- 250/637: Performed by: EMERGENCY MEDICINE

## 2022-10-09 PROCEDURE — 80053 COMPREHEN METABOLIC PANEL: CPT

## 2022-10-09 PROCEDURE — 83690 ASSAY OF LIPASE: CPT

## 2022-10-09 PROCEDURE — 83880 ASSAY OF NATRIURETIC PEPTIDE: CPT

## 2022-10-09 RX ORDER — ONDANSETRON 4 MG/1
4 TABLET, ORALLY DISINTEGRATING ORAL
Qty: 7 TABLET | Refills: 0 | Status: SHIPPED | OUTPATIENT
Start: 2022-10-09 | End: 2022-10-24

## 2022-10-09 RX ORDER — ACETAMINOPHEN 500 MG
1000 TABLET ORAL ONCE
Status: COMPLETED | OUTPATIENT
Start: 2022-10-09 | End: 2022-10-09

## 2022-10-09 RX ORDER — ONDANSETRON 4 MG/1
4 TABLET, ORALLY DISINTEGRATING ORAL
Status: COMPLETED | OUTPATIENT
Start: 2022-10-09 | End: 2022-10-09

## 2022-10-09 RX ADMIN — ACETAMINOPHEN 1000 MG: 500 TABLET ORAL at 10:57

## 2022-10-09 RX ADMIN — ONDANSETRON 4 MG: 4 TABLET, ORALLY DISINTEGRATING ORAL at 10:57

## 2022-10-09 RX ADMIN — SODIUM CHLORIDE 1000 ML: 9 INJECTION, SOLUTION INTRAVENOUS at 10:56

## 2022-10-09 RX ADMIN — IOPAMIDOL 100 ML: 755 INJECTION, SOLUTION INTRAVENOUS at 12:38

## 2022-10-09 NOTE — ED PROVIDER NOTES
EMERGENCY DEPARTMENT HISTORY AND PHYSICAL EXAM      Date: 10/9/2022  Patient Name: Andressa Christopher      History of Presenting Illness     Chief Complaint   Patient presents with    Abdominal Pain       History Provided By: Patient and Patient's Brother    HPI: [de-identified] T Candi, 59 y.o. male with a past medical history significant for Bipolar schizophrenia, Intellectual delay presents to the ED with cc of abdominal pain, diarrhea. History mostly from brother. Pt c/o periumbilical pain for 2d, had 1 episode NBNB emesis yesterday (drank water and vomited it back up immediately after c/o abd pain), has been having ~2 episodes of diarrhea daily. Denies F/C, cough, CP, SOB. History limited 2/2 pt intellectual delay. There are no other complaints, changes, or physical findings at this time. PCP: Heidi Rogers NP    Current Facility-Administered Medications   Medication Dose Route Frequency Provider Last Rate Last Admin    ondansetron (ZOFRAN ODT) tablet 4 mg  4 mg Oral NOW Katy Bellamy MD        sodium chloride 0.9 % bolus infusion 1,000 mL  1,000 mL IntraVENous ONCE Katy Bellamy MD        acetaminophen (TYLENOL) tablet 1,000 mg  1,000 mg Oral ONCE Katy Bellamy MD        haloperidol decanoate (HALDOL DECANOATE) 100 mg/mL LA injection 100 mg  100 mg IntraMUSCular Q 14 DAYS Heidi Rogers NP   100 mg at 08/09/22 1510     Current Outpatient Medications   Medication Sig Dispense Refill    potassium chloride (KAON 10%) 20 mEq/15 mL solution Take 15 mL by mouth daily. 480 mL 3    haloperidol decanoate (HALDOL DECANOATE) 100 mg/mL injection INJECT 100 MG (1 ML) INTRAMUSCULARLY EVERY 14 DAYS. 1 mL 5    haloperidoL (HALDOL) 5 mg tablet Take 1.25 mg by mouth daily. 2.5mg daily      lithium carbonate SR (LITHOBID) 300 mg CR tablet Take 300 mg by mouth three (3) times daily. benztropine (COGENTIN) 1 mg tablet Take 2 Tablets by mouth two (2) times a day.  Taking 3mg Q AM and 1.5mg QPM      buPROPion XL (WELLBUTRIN XL) 150 mg tablet Take 1 Tablet by mouth nightly. 30 Tablet 0    risperiDONE (RisperDAL) 3 mg tablet Take 1 Tablet by mouth two (2) times a day. 60 Tablet 0       Past History     Past Medical History:  Past Medical History:   Diagnosis Date    Anxiety     Depression     Intellectual disability     Schizoaffective disorder, bipolar type ([facility] [address]     Schizophrenia ([facility]        Past Surgical History:  No past surgical history on file. Family History:  Family History   Problem Relation Age of Onset    Cancer Father         bone    Pancreatic Cancer Father     Dementia Mother        Social History:  Social History     Tobacco Use    Smoking status: Never    Smokeless tobacco: Never   Vaping Use    Vaping Use: Never used   Substance Use Topics    Alcohol use: Never    Drug use: Never       Allergies:  No Known Allergies      Review of Systems   Constitutional: Negative except as in HPI. Eyes: Negative except as in HPI.  ENT: Negative except as in HPI. Cardiovascular: Negative except as in HPI. Respiratory: Negative except as in HPI. Gastrointestinal: Negative except as in HPI. Genitourinary: Negative except as in HPI. Musculoskeletal: Negative except as in HPI. Integumentary: Negative except as in HPI. Neurological: Negative except as in HPI. Psychiatric: Negative except as in HPI. Endocrine: Negative except as in HPI. Hematologic/Lymphatic: Negative except as in HPI. Allergic/Immunologic: Negative except as in HPI. Physical Exam   Constitutional: Awake and alert, interactive, NAD  Eyes: PERRL, no injection or scleral icterus, no discharge  HEENT: NCAT, neck supple, MMM, no oropharyngeal exudates  CV: RRR, no m/r/g  Respiratory: CTAB, no r/r/w  GI: Abd soft, nondistended, ttp periumbilically only, no r/g.  : Deferred  MSK: FROM, no joint effusions or edema  Skin: No rashes  Neuro: CN2-12 intact, symmetric facies, fluent speech.   Psych: Well-groomed, normal speech, behavior, appropriate mood    Lab and Diagnostic Study Results     Labs -   No results found for this or any previous visit (from the past 12 hour(s)). Radiologic Studies -   [unfilled]  CT Results  (Last 48 hours)      None          CXR Results  (Last 48 hours)      None            Medical Decision Making and ED Course   - I am the first and primary provider for this patient AND AM THE PRIMARY PROVIDER OF RECORD. - I reviewed the vital signs, available nursing notes, past medical history, past surgical history, family history and social history. - Initial assessment performed. The patients presenting problems have been discussed, and the staff are in agreement with the care plan formulated and outlined with them. I have encouraged them to ask questions as they arise throughout their visit. Vital Signs-Reviewed the patient's vital signs. Patient Vitals for the past 12 hrs:   Temp Pulse Resp BP SpO2   10/09/22 1034 98 °F (36.7 °C) 71 14 117/81 96 %       EKG interpretation:         Provider Notes (Medical Decision Making):   57M w/abd pain, possibly gastroenteritis given N/V/D. Will get CTAP to eval for obstruction, intra-abd bacterial infx. Zofran for nausea, tylenol for pain, IVF. Dispo pending workup, tx response. ED Course:       ED Course as of 10/09/22 1306   Sun Oct 09, 2022   1234 Lipase: 122 [YA]   1234 Creatinine: 0.97 [YA]   1234 NT pro-BNP: 33 [YA]   1253 CT ABD PELV W CONT  IMPRESSION     No evidence for acute abdominal abnormality. Indeterminate 13 mm left adrenal  nodule. Please refer to above findings for complete details. [YA]      ED Course User Index  [YA] Bladimir Byers MD           Disposition     Disposition: DC- Adult Discharges: All of the diagnostic tests were reviewed and questions answered. Diagnosis, care plan and treatment options were discussed. The patient understands the instructions and will follow up as directed. The patients results have been reviewed with them.   They have been counseled regarding their diagnosis. The patient and caregiver verbally convey understanding and agreement of the signs, symptoms, diagnosis, treatment and prognosis and additionally agrees to follow up as recommended with their PCP in 24 - 48 hours. They also agree with the care-plan and convey that all of their questions have been answered. I have also put together some discharge instructions for them that include: 1) educational information regarding their diagnosis, 2) how to care for their diagnosis at home, as well a 3) list of reasons why they would want to return to the ED prior to their follow-up appointment, should their condition change. Discharged      Diagnosis     Clinical Impression:   1. Periumbilical pain        Attestations:     Jatin Romero MD

## 2022-10-09 NOTE — DISCHARGE INSTRUCTIONS
Joshua Mercado was seen in our ER for his abdominal pain and vomiting, diarrhea. Thankfully, we did not find any dangerous causes of this on his bloodwork or CT scan. We also did not show any signs of damage to his heart. We saw a nodule on his adrenal gland that he should follow up on, but this is not immediately dangerous. Follow up with his primary care doctor in the next week. We have given him an antinausea medication he can take at home as needed for nausea. Return to the ER for any severe pain, uncontrollable vomiting or diarrhea or any other new or concerning symptoms. Thank you! Thank you for allowing me to care for you in the emergency department. It is my goal to provide you with excellent care. If you have not received excellent quality care, please ask to speak to the nurse manager. Please fill out the survey that will come to you by mail or email since we listen to your feedback! Below you will find a list of your tests from today's visit. Should you have any questions, please do not hesitate to call the emergency department. Labs  Recent Results (from the past 12 hour(s))   CBC WITH AUTOMATED DIFF    Collection Time: 10/09/22 10:47 AM   Result Value Ref Range    WBC 7.4 4.1 - 11.1 K/uL    RBC 4.59 4. 10 - 5.70 M/uL    HGB 13.6 12.1 - 17.0 g/dL    HCT 40.5 36.6 - 50.3 %    MCV 88.2 80.0 - 99.0 FL    MCH 29.6 26.0 - 34.0 PG    MCHC 33.6 30.0 - 36.5 g/dL    RDW 13.1 11.5 - 14.5 %    PLATELET 206 227 - 200 K/uL    MPV 10.0 8.9 - 12.9 FL    NRBC 0.0 0.0  WBC    ABSOLUTE NRBC 0.00 0.00 - 0.01 K/uL    NEUTROPHILS 79 (H) 32 - 75 %    LYMPHOCYTES 11 (L) 12 - 49 %    MONOCYTES 8 5 - 13 %    EOSINOPHILS 2 0 - 7 %    BASOPHILS 0 0 - 1 %    IMMATURE GRANULOCYTES 0 0 - 0.5 %    ABS. NEUTROPHILS 5.8 1.8 - 8.0 K/UL    ABS. LYMPHOCYTES 0.8 0.8 - 3.5 K/UL    ABS. MONOCYTES 0.6 0.0 - 1.0 K/UL    ABS. EOSINOPHILS 0.1 0.0 - 0.4 K/UL    ABS. BASOPHILS 0.0 0.0 - 0.1 K/UL    ABS. IMM.  GRANS. 0.0 0.00 - 0.04 K/UL DF AUTOMATED     METABOLIC PANEL, COMPREHENSIVE    Collection Time: 10/09/22 10:47 AM   Result Value Ref Range    Sodium 139 136 - 145 mmol/L    Potassium 4.6 3.5 - 5.1 mmol/L    Chloride 109 (H) 97 - 108 mmol/L    CO2 25 21 - 32 mmol/L    Anion gap 5 5 - 15 mmol/L    Glucose 147 (H) 65 - 100 mg/dL    BUN 14 6 - 20 mg/dL    Creatinine 0.97 0.70 - 1.30 mg/dL    BUN/Creatinine ratio 14 12 - 20      eGFR >60 >60 ml/min/1.73m2    Calcium 8.9 8.5 - 10.1 mg/dL    Bilirubin, total 0.6 0.2 - 1.0 mg/dL    AST (SGOT) 29 15 - 37 U/L    ALT (SGPT) 42 12 - 78 U/L    Alk. phosphatase 68 45 - 117 U/L    Protein, total 6.9 6.4 - 8.2 g/dL    Albumin 3.6 3.5 - 5.0 g/dL    Globulin 3.3 2.0 - 4.0 g/dL    A-G Ratio 1.1 1.1 - 2.2     NT-PRO BNP    Collection Time: 10/09/22 10:47 AM   Result Value Ref Range    NT pro-BNP 33 <125 pg/mL   TROPONIN-HIGH SENSITIVITY    Collection Time: 10/09/22 10:47 AM   Result Value Ref Range    Troponin-High Sensitivity 7 0 - 76 ng/L   LIPASE    Collection Time: 10/09/22 10:47 AM   Result Value Ref Range    Lipase 122 73 - 393 U/L       Radiologic Studies  CT ABD PELV W CONT   Final Result      No evidence for acute abdominal abnormality. Indeterminate 13 mm left adrenal   nodule. Please refer to above findings for complete details. CT Results  (Last 48 hours)                 10/09/22 1225  CT ABD PELV W CONT Final result    Impression:      No evidence for acute abdominal abnormality. Indeterminate 13 mm left adrenal   nodule. Please refer to above findings for complete details. Narrative:  INDICATION: Abdominal pain       COMPARISON: None       TECHNIQUE:    Thin axial images were obtained through the abdomen and pelvis following   intravenous iodinated contrast administration. Coronal and sagittal   reconstructions were generated. Oral contrast was not administered.  CT dose   reduction was achieved through use of a standardized protocol tailored for this   examination and automatic exposure control for dose modulation. FINDINGS:        LIVER: No mass or biliary dilatation. GALLBLADDER: No calcified gallstone   SPLEEN: Unremarkable   PANCREAS: No mass or ductal dilatation. ADRENALS: Right is normal. Indeterminate 13 mm left adrenal nodule   KIDNEYS/URETERS: Symmetric nephrograms with subcentimeter low-density lesions. No evidence for abnormal enhancing renal mass. No hydronephrosis    PERITONEUM: No abdominal lymphadenopathy or ascites. COLON: No dilatation or wall thickening. APPENDIX: Unremarkable. SMALL BOWEL: No dilatation or wall thickening. STOMACH: Unremarkable. PELVIS: No pelvic lymphadenopathy or free fluid. BONES: No destructive bone lesion. VISUALIZED THORAX: No significant abnormality   ADDITIONAL COMMENTS: N/A                 CXR Results  (Last 48 hours)      None          ------------------------------------------------------------------------------------------------------------  The exam and treatment you received in the Emergency Department were for an urgent problem and are not intended as complete care. It is important that you follow-up with a doctor, nurse practitioner, or physician assistant to:  (1) confirm your diagnosis,  (2) re-evaluation of changes in your illness and treatment, and  (3) for ongoing care. Please take your discharge instructions with you when you go to your follow-up appointment. If you have any problem arranging a follow-up appointment, contact the Emergency Department. If your symptoms become worse or you do not improve as expected and you are unable to reach your health care provider, please return to the Emergency Department. We are available 24 hours a day. If a prescription has been provided, please have it filled as soon as possible to prevent a delay in treatment.  If you have any questions or reservations about taking the medication due to side effects or interactions with other medications, please call your primary care provider or contact the ER.

## 2022-10-09 NOTE — ED TRIAGE NOTES
Hx of MR/mental illness per brother, brother states pt has been c/o of abd pain, bloating, hand and feet swelling

## 2022-10-18 ENCOUNTER — CLINICAL SUPPORT (OUTPATIENT)
Dept: FAMILY MEDICINE CLINIC | Age: 57
End: 2022-10-18
Payer: MEDICARE

## 2022-10-18 DIAGNOSIS — F20.9 SCHIZOPHRENIA, UNSPECIFIED TYPE (HCC): Primary | ICD-10-CM

## 2022-10-18 PROCEDURE — 96372 THER/PROPH/DIAG INJ SC/IM: CPT | Performed by: NURSE PRACTITIONER

## 2022-10-18 RX ADMIN — HALOPERIDOL DECANOATE 100 MG: 100 INJECTION INTRAMUSCULAR at 15:17

## 2022-10-18 NOTE — PROGRESS NOTES
Pt came in today for a Haloperidol  injection, tolerated injection well he was brought by his brother  - [facility]- [id number]  1mL  - Lot#- [id number]  - Exp. 5//2023  Site- Left deltoid

## 2022-10-24 ENCOUNTER — APPOINTMENT (OUTPATIENT)
Dept: CT IMAGING | Age: 57
DRG: 100 | End: 2022-10-24
Attending: STUDENT IN AN ORGANIZED HEALTH CARE EDUCATION/TRAINING PROGRAM
Payer: MEDICARE

## 2022-10-24 ENCOUNTER — APPOINTMENT (OUTPATIENT)
Dept: GENERAL RADIOLOGY | Age: 57
DRG: 100 | End: 2022-10-24
Attending: EMERGENCY MEDICINE
Payer: MEDICARE

## 2022-10-24 ENCOUNTER — HOSPITAL ENCOUNTER (EMERGENCY)
Age: 57
Discharge: HOME | DRG: 100 | End: 2022-10-24
Attending: STUDENT IN AN ORGANIZED HEALTH CARE EDUCATION/TRAINING PROGRAM
Payer: MEDICARE

## 2022-10-24 ENCOUNTER — HOSPITAL ENCOUNTER (INPATIENT)
Age: 57
LOS: 2 days | Discharge: HOME | DRG: 100 | End: 2022-10-26
Attending: EMERGENCY MEDICINE | Admitting: HOSPITALIST
Payer: MEDICARE

## 2022-10-24 VITALS
HEART RATE: 57 BPM | TEMPERATURE: 97.8 F | HEIGHT: 65 IN | DIASTOLIC BLOOD PRESSURE: 82 MMHG | OXYGEN SATURATION: 98 % | RESPIRATION RATE: 18 BRPM | SYSTOLIC BLOOD PRESSURE: 123 MMHG | WEIGHT: 140 LBS | BODY MASS INDEX: 23.32 KG/M2

## 2022-10-24 DIAGNOSIS — Z86.59 HISTORY OF SCHIZOPHRENIA: ICD-10-CM

## 2022-10-24 DIAGNOSIS — R56.9 SEIZURE (HCC): Primary | ICD-10-CM

## 2022-10-24 PROBLEM — G40.901 STATUS EPILEPTICUS (HCC): Status: ACTIVE | Noted: 2022-10-24

## 2022-10-24 LAB
ALBUMIN SERPL-MCNC: 3.3 G/DL (ref 3.5–5)
ALBUMIN SERPL-MCNC: 3.8 G/DL (ref 3.5–5)
ALBUMIN/GLOB SERPL: 1.1 {RATIO} (ref 1.1–2.2)
ALBUMIN/GLOB SERPL: 1.2 {RATIO} (ref 1.1–2.2)
ALP SERPL-CCNC: 62 U/L (ref 45–117)
ALP SERPL-CCNC: 69 U/L (ref 45–117)
ALT SERPL-CCNC: 26 U/L (ref 12–78)
ALT SERPL-CCNC: 28 U/L (ref 12–78)
AMMONIA PLAS-SCNC: 23 UMOL/L
AMPHET UR QL SCN: NEGATIVE
ANION GAP SERPL CALC-SCNC: 12 MMOL/L (ref 5–15)
ANION GAP SERPL CALC-SCNC: 5 MMOL/L (ref 5–15)
APAP SERPL-MCNC: <10 UG/ML (ref 10–30)
APPEARANCE UR: CLEAR
APTT PPP: 24.8 SEC (ref 21.2–34.1)
AST SERPL W P-5'-P-CCNC: 15 U/L (ref 15–37)
AST SERPL W P-5'-P-CCNC: 21 U/L (ref 15–37)
ATRIAL RATE: 84 BPM
BACTERIA URNS QL MICRO: NEGATIVE /HPF
BARBITURATES UR QL SCN: NEGATIVE
BASOPHILS # BLD: 0 K/UL (ref 0–0.1)
BASOPHILS # BLD: 0 K/UL (ref 0–0.1)
BASOPHILS NFR BLD: 0 % (ref 0–1)
BASOPHILS NFR BLD: 0 % (ref 0–1)
BENZODIAZ UR QL: NEGATIVE
BILIRUB SERPL-MCNC: 0.2 MG/DL (ref 0.2–1)
BILIRUB SERPL-MCNC: 0.4 MG/DL (ref 0.2–1)
BILIRUB UR QL: NEGATIVE
BUN SERPL-MCNC: 15 MG/DL (ref 6–20)
BUN SERPL-MCNC: 16 MG/DL (ref 6–20)
BUN/CREAT SERPL: 13 (ref 12–20)
BUN/CREAT SERPL: 19 (ref 12–20)
CA-I BLD-MCNC: 9 MG/DL (ref 8.5–10.1)
CA-I BLD-MCNC: 9.7 MG/DL (ref 8.5–10.1)
CALCULATED P AXIS, ECG09: 30 DEGREES
CALCULATED R AXIS, ECG10: -10 DEGREES
CALCULATED T AXIS, ECG11: 17 DEGREES
CANNABINOIDS UR QL SCN: NEGATIVE
CHLORIDE SERPL-SCNC: 108 MMOL/L (ref 97–108)
CHLORIDE SERPL-SCNC: 110 MMOL/L (ref 97–108)
CK SERPL-CCNC: 99 U/L (ref 39–308)
CO2 SERPL-SCNC: 20 MMOL/L (ref 21–32)
CO2 SERPL-SCNC: 24 MMOL/L (ref 21–32)
COCAINE UR QL SCN: NEGATIVE
COLOR UR: ABNORMAL
COVID-19 RAPID TEST, COVR: NOT DETECTED
CREAT SERPL-MCNC: 0.83 MG/DL (ref 0.7–1.3)
CREAT SERPL-MCNC: 1.2 MG/DL (ref 0.7–1.3)
DIAGNOSIS, 93000: NORMAL
DIFFERENTIAL METHOD BLD: ABNORMAL
DIFFERENTIAL METHOD BLD: ABNORMAL
DRUG SCRN COMMENT,DRGCM: NORMAL
EOSINOPHIL # BLD: 0.1 K/UL (ref 0–0.4)
EOSINOPHIL # BLD: 0.2 K/UL (ref 0–0.4)
EOSINOPHIL NFR BLD: 2 % (ref 0–7)
EOSINOPHIL NFR BLD: 2 % (ref 0–7)
ERYTHROCYTE [DISTWIDTH] IN BLOOD BY AUTOMATED COUNT: 12.7 % (ref 11.5–14.5)
ERYTHROCYTE [DISTWIDTH] IN BLOOD BY AUTOMATED COUNT: 13 % (ref 11.5–14.5)
ETHANOL SERPL-MCNC: <10 MG/DL
GLOBULIN SER CALC-MCNC: 3.1 G/DL (ref 2–4)
GLOBULIN SER CALC-MCNC: 3.2 G/DL (ref 2–4)
GLUCOSE SERPL-MCNC: 116 MG/DL (ref 65–100)
GLUCOSE SERPL-MCNC: 119 MG/DL (ref 65–100)
GLUCOSE UR STRIP.AUTO-MCNC: NEGATIVE MG/DL
HCT VFR BLD AUTO: 39.9 % (ref 36.6–50.3)
HCT VFR BLD AUTO: 44.4 % (ref 36.6–50.3)
HGB BLD-MCNC: 13.5 G/DL (ref 12.1–17)
HGB BLD-MCNC: 14.5 G/DL (ref 12.1–17)
HGB UR QL STRIP: ABNORMAL
IMM GRANULOCYTES # BLD AUTO: 0.1 K/UL (ref 0–0.04)
IMM GRANULOCYTES # BLD AUTO: 0.1 K/UL (ref 0–0.04)
IMM GRANULOCYTES NFR BLD AUTO: 1 % (ref 0–0.5)
IMM GRANULOCYTES NFR BLD AUTO: 1 % (ref 0–0.5)
INR PPP: 0.9 (ref 0.9–1.1)
KETONES UR QL STRIP.AUTO: NEGATIVE MG/DL
LEUKOCYTE ESTERASE UR QL STRIP.AUTO: NEGATIVE
LITHIUM SERPL-SCNC: 1 MMOL/L (ref 0.6–1.2)
LYMPHOCYTES # BLD: 0.9 K/UL (ref 0.8–3.5)
LYMPHOCYTES # BLD: 2.3 K/UL (ref 0.8–3.5)
LYMPHOCYTES NFR BLD: 10 % (ref 12–49)
LYMPHOCYTES NFR BLD: 22 % (ref 12–49)
MAGNESIUM SERPL-MCNC: 2.1 MG/DL (ref 1.6–2.4)
MAGNESIUM SERPL-MCNC: 2.2 MG/DL (ref 1.6–2.4)
MCH RBC QN AUTO: 29.8 PG (ref 26–34)
MCH RBC QN AUTO: 30.1 PG (ref 26–34)
MCHC RBC AUTO-ENTMCNC: 32.7 G/DL (ref 30–36.5)
MCHC RBC AUTO-ENTMCNC: 33.8 G/DL (ref 30–36.5)
MCV RBC AUTO: 89.1 FL (ref 80–99)
MCV RBC AUTO: 91.2 FL (ref 80–99)
METHADONE UR QL: NEGATIVE
MONOCYTES # BLD: 0.8 K/UL (ref 0–1)
MONOCYTES # BLD: 1 K/UL (ref 0–1)
MONOCYTES NFR BLD: 8 % (ref 5–13)
MONOCYTES NFR BLD: 9 % (ref 5–13)
NEUTS SEG # BLD: 6.9 K/UL (ref 1.8–8)
NEUTS SEG # BLD: 7.4 K/UL (ref 1.8–8)
NEUTS SEG NFR BLD: 66 % (ref 32–75)
NEUTS SEG NFR BLD: 79 % (ref 32–75)
NITRITE UR QL STRIP.AUTO: NEGATIVE
NRBC # BLD: 0 K/UL (ref 0–0.01)
NRBC # BLD: 0 K/UL (ref 0–0.01)
NRBC BLD-RTO: 0 PER 100 WBC
NRBC BLD-RTO: 0 PER 100 WBC
OPIATES UR QL: NEGATIVE
P-R INTERVAL, ECG05: 208 MS
PCP UR QL: NEGATIVE
PH UR STRIP: 7 [PH] (ref 5–8)
PLATELET # BLD AUTO: 209 K/UL (ref 150–400)
PLATELET # BLD AUTO: 251 K/UL (ref 150–400)
PMV BLD AUTO: 10.1 FL (ref 8.9–12.9)
PMV BLD AUTO: 9.9 FL (ref 8.9–12.9)
POTASSIUM SERPL-SCNC: 3.9 MMOL/L (ref 3.5–5.1)
POTASSIUM SERPL-SCNC: 4.2 MMOL/L (ref 3.5–5.1)
PROT SERPL-MCNC: 6.4 G/DL (ref 6.4–8.2)
PROT SERPL-MCNC: 7 G/DL (ref 6.4–8.2)
PROT UR STRIP-MCNC: NEGATIVE MG/DL
PROTHROMBIN TIME: 12.7 SEC (ref 11.9–14.6)
Q-T INTERVAL, ECG07: 410 MS
QRS DURATION, ECG06: 100 MS
QTC CALCULATION (BEZET), ECG08: 484 MS
RBC # BLD AUTO: 4.48 M/UL (ref 4.1–5.7)
RBC # BLD AUTO: 4.87 M/UL (ref 4.1–5.7)
RBC #/AREA URNS HPF: ABNORMAL /HPF (ref 0–5)
SALICYLATES SERPL-MCNC: <1.7 MG/DL (ref 2.8–20)
SODIUM SERPL-SCNC: 137 MMOL/L (ref 136–145)
SODIUM SERPL-SCNC: 142 MMOL/L (ref 136–145)
SP GR UR REFRACTOMETRY: 1.01 (ref 1–1.03)
THERAPEUTIC RANGE,PTTT: NORMAL SEC (ref 82–109)
TROPONIN-HIGH SENSITIVITY: 7 NG/L (ref 0–76)
TSH SERPL DL<=0.05 MIU/L-ACNC: 2.35 UIU/ML (ref 0.36–3.74)
UA: UC IF INDICATED,UAUC: ABNORMAL
UROBILINOGEN UR QL STRIP.AUTO: 0.1 EU/DL (ref 0.1–1)
VENTRICULAR RATE, ECG03: 84 BPM
WBC # BLD AUTO: 10.4 K/UL (ref 4.1–11.1)
WBC # BLD AUTO: 9.3 K/UL (ref 4.1–11.1)
WBC URNS QL MICRO: ABNORMAL /HPF (ref 0–4)

## 2022-10-24 PROCEDURE — 74011000250 HC RX REV CODE- 250: Performed by: HOSPITALIST

## 2022-10-24 PROCEDURE — 85025 COMPLETE CBC W/AUTO DIFF WBC: CPT

## 2022-10-24 PROCEDURE — 80053 COMPREHEN METABOLIC PANEL: CPT

## 2022-10-24 PROCEDURE — 80179 DRUG ASSAY SALICYLATE: CPT

## 2022-10-24 PROCEDURE — 83735 ASSAY OF MAGNESIUM: CPT

## 2022-10-24 PROCEDURE — 82140 ASSAY OF AMMONIA: CPT

## 2022-10-24 PROCEDURE — 74011000250 HC RX REV CODE- 250: Performed by: EMERGENCY MEDICINE

## 2022-10-24 PROCEDURE — 65610000006 HC RM INTENSIVE CARE

## 2022-10-24 PROCEDURE — 74011250636 HC RX REV CODE- 250/636: Performed by: EMERGENCY MEDICINE

## 2022-10-24 PROCEDURE — 99285 EMERGENCY DEPT VISIT HI MDM: CPT

## 2022-10-24 PROCEDURE — 84443 ASSAY THYROID STIM HORMONE: CPT

## 2022-10-24 PROCEDURE — 93005 ELECTROCARDIOGRAM TRACING: CPT

## 2022-10-24 PROCEDURE — 81001 URINALYSIS AUTO W/SCOPE: CPT

## 2022-10-24 PROCEDURE — 80143 DRUG ASSAY ACETAMINOPHEN: CPT

## 2022-10-24 PROCEDURE — 74011250637 HC RX REV CODE- 250/637: Performed by: HOSPITALIST

## 2022-10-24 PROCEDURE — 80178 ASSAY OF LITHIUM: CPT

## 2022-10-24 PROCEDURE — 96375 TX/PRO/DX INJ NEW DRUG ADDON: CPT

## 2022-10-24 PROCEDURE — 85610 PROTHROMBIN TIME: CPT

## 2022-10-24 PROCEDURE — 85730 THROMBOPLASTIN TIME PARTIAL: CPT

## 2022-10-24 PROCEDURE — 80307 DRUG TEST PRSMV CHEM ANLYZR: CPT

## 2022-10-24 PROCEDURE — 74011250636 HC RX REV CODE- 250/636: Performed by: HOSPITALIST

## 2022-10-24 PROCEDURE — 82077 ASSAY SPEC XCP UR&BREATH IA: CPT

## 2022-10-24 PROCEDURE — 74011000258 HC RX REV CODE- 258: Performed by: EMERGENCY MEDICINE

## 2022-10-24 PROCEDURE — 87641 MR-STAPH DNA AMP PROBE: CPT

## 2022-10-24 PROCEDURE — 82550 ASSAY OF CK (CPK): CPT

## 2022-10-24 PROCEDURE — 96365 THER/PROPH/DIAG IV INF INIT: CPT

## 2022-10-24 PROCEDURE — 70450 CT HEAD/BRAIN W/O DYE: CPT

## 2022-10-24 PROCEDURE — 84484 ASSAY OF TROPONIN QUANT: CPT

## 2022-10-24 PROCEDURE — 87040 BLOOD CULTURE FOR BACTERIA: CPT

## 2022-10-24 PROCEDURE — 71045 X-RAY EXAM CHEST 1 VIEW: CPT

## 2022-10-24 PROCEDURE — 87635 SARS-COV-2 COVID-19 AMP PRB: CPT

## 2022-10-24 RX ORDER — FLUOXETINE HYDROCHLORIDE 20 MG/1
20 CAPSULE ORAL DAILY
COMMUNITY

## 2022-10-24 RX ORDER — LITHIUM CARBONATE 300 MG
300 TABLET ORAL
Status: DISCONTINUED | OUTPATIENT
Start: 2022-10-25 | End: 2022-10-26 | Stop reason: HOSPADM

## 2022-10-24 RX ORDER — ENOXAPARIN SODIUM 100 MG/ML
40 INJECTION SUBCUTANEOUS EVERY 24 HOURS
Status: DISCONTINUED | OUTPATIENT
Start: 2022-10-24 | End: 2022-10-26 | Stop reason: HOSPADM

## 2022-10-24 RX ORDER — LEVETIRACETAM 500 MG/5ML
500 INJECTION, SOLUTION, CONCENTRATE INTRAVENOUS ONCE
Status: COMPLETED | OUTPATIENT
Start: 2022-10-24 | End: 2022-10-24

## 2022-10-24 RX ORDER — TEMAZEPAM 15 MG/1
30 CAPSULE ORAL
Status: DISCONTINUED | OUTPATIENT
Start: 2022-10-24 | End: 2022-10-26 | Stop reason: HOSPADM

## 2022-10-24 RX ORDER — SODIUM CHLORIDE 0.9 % (FLUSH) 0.9 %
5-40 SYRINGE (ML) INJECTION EVERY 8 HOURS
Status: DISCONTINUED | OUTPATIENT
Start: 2022-10-24 | End: 2022-10-26 | Stop reason: HOSPADM

## 2022-10-24 RX ORDER — LORAZEPAM 0.5 MG/1
0.5 TABLET ORAL ONCE
Status: DISCONTINUED | OUTPATIENT
Start: 2022-10-24 | End: 2022-10-24

## 2022-10-24 RX ORDER — RISPERIDONE 3 MG/1
3 TABLET, FILM COATED ORAL 2 TIMES DAILY
COMMUNITY
End: 2022-11-29 | Stop reason: SDUPTHER

## 2022-10-24 RX ORDER — PALIPERIDONE 1.5 MG/1
1.5 TABLET, EXTENDED RELEASE ORAL DAILY
COMMUNITY
Start: 2022-10-19 | End: 2022-11-29 | Stop reason: SDUPTHER

## 2022-10-24 RX ORDER — HALOPERIDOL 2 MG/1
2 TABLET ORAL DAILY
Status: ON HOLD | COMMUNITY
Start: 2022-09-25 | End: 2022-10-24

## 2022-10-24 RX ORDER — FLUOXETINE HYDROCHLORIDE 20 MG/1
20 CAPSULE ORAL
COMMUNITY
End: 2022-11-16

## 2022-10-24 RX ORDER — LORAZEPAM 2 MG/ML
1 INJECTION INTRAMUSCULAR AS NEEDED
Status: DISCONTINUED | OUTPATIENT
Start: 2022-10-24 | End: 2022-10-26 | Stop reason: HOSPADM

## 2022-10-24 RX ORDER — LITHIUM CARBONATE 300 MG/1
300 TABLET, FILM COATED, EXTENDED RELEASE ORAL 3 TIMES DAILY
Status: DISCONTINUED | OUTPATIENT
Start: 2022-10-24 | End: 2022-10-24

## 2022-10-24 RX ORDER — SODIUM CHLORIDE 0.9 % (FLUSH) 0.9 %
5-40 SYRINGE (ML) INJECTION AS NEEDED
Status: DISCONTINUED | OUTPATIENT
Start: 2022-10-24 | End: 2022-10-26 | Stop reason: HOSPADM

## 2022-10-24 RX ORDER — TEMAZEPAM 30 MG/1
30 CAPSULE ORAL
COMMUNITY
Start: 2022-09-29

## 2022-10-24 RX ORDER — LURASIDONE HYDROCHLORIDE 20 MG/1
20 TABLET, FILM COATED ORAL DAILY
Status: ON HOLD | COMMUNITY
Start: 2022-07-18 | End: 2022-10-24

## 2022-10-24 RX ORDER — POTASSIUM CHLORIDE 750 MG/1
10 TABLET, FILM COATED, EXTENDED RELEASE ORAL DAILY
Refills: 3 | Status: DISCONTINUED | OUTPATIENT
Start: 2022-10-25 | End: 2022-10-26 | Stop reason: HOSPADM

## 2022-10-24 RX ORDER — SODIUM CHLORIDE 9 MG/ML
100 INJECTION, SOLUTION INTRAVENOUS CONTINUOUS
Status: DISPENSED | OUTPATIENT
Start: 2022-10-24 | End: 2022-10-25

## 2022-10-24 RX ORDER — BUPROPION HYDROCHLORIDE 150 MG/1
150 TABLET, EXTENDED RELEASE ORAL 2 TIMES DAILY
COMMUNITY
End: 2022-10-26

## 2022-10-24 RX ORDER — FLUOXETINE HYDROCHLORIDE 20 MG/1
40 CAPSULE ORAL DAILY
Status: DISCONTINUED | OUTPATIENT
Start: 2022-10-25 | End: 2022-10-26 | Stop reason: HOSPADM

## 2022-10-24 RX ORDER — FLUOXETINE HYDROCHLORIDE 20 MG/1
20 CAPSULE ORAL
Status: DISCONTINUED | OUTPATIENT
Start: 2022-10-25 | End: 2022-10-26 | Stop reason: HOSPADM

## 2022-10-24 RX ORDER — LORAZEPAM 2 MG/ML
1 INJECTION INTRAMUSCULAR ONCE
Status: COMPLETED | OUTPATIENT
Start: 2022-10-24 | End: 2022-10-24

## 2022-10-24 RX ORDER — LITHIUM CARBONATE 300 MG/1
300 CAPSULE ORAL
COMMUNITY

## 2022-10-24 RX ORDER — HALOPERIDOL 1 MG/1
2 TABLET ORAL DAILY
Status: DISCONTINUED | OUTPATIENT
Start: 2022-10-25 | End: 2022-10-25

## 2022-10-24 RX ORDER — VALBENAZINE 60 MG/1
1 CAPSULE ORAL
COMMUNITY

## 2022-10-24 RX ORDER — DOCUSATE SODIUM 100 MG/1
100 CAPSULE, LIQUID FILLED ORAL 2 TIMES DAILY
Status: DISCONTINUED | OUTPATIENT
Start: 2022-10-24 | End: 2022-10-26 | Stop reason: HOSPADM

## 2022-10-24 RX ORDER — LITHIUM CARBONATE 300 MG/1
600 TABLET, FILM COATED, EXTENDED RELEASE ORAL DAILY
Status: DISCONTINUED | OUTPATIENT
Start: 2022-10-26 | End: 2022-10-26 | Stop reason: HOSPADM

## 2022-10-24 RX ADMIN — SODIUM CHLORIDE 100 ML/HR: 9 INJECTION, SOLUTION INTRAVENOUS at 22:42

## 2022-10-24 RX ADMIN — ENOXAPARIN SODIUM 40 MG: 100 INJECTION SUBCUTANEOUS at 21:16

## 2022-10-24 RX ADMIN — CEFTRIAXONE 2 G: 2 INJECTION, POWDER, FOR SOLUTION INTRAMUSCULAR; INTRAVENOUS at 18:30

## 2022-10-24 RX ADMIN — SODIUM CHLORIDE, PRESERVATIVE FREE 10 ML: 5 INJECTION INTRAVENOUS at 21:13

## 2022-10-24 RX ADMIN — LORAZEPAM 1 MG: 2 INJECTION INTRAMUSCULAR at 16:25

## 2022-10-24 RX ADMIN — DOCUSATE SODIUM 100 MG: 100 CAPSULE, LIQUID FILLED ORAL at 21:15

## 2022-10-24 RX ADMIN — LITHIUM CARBONATE 300 MG: 300 TABLET, EXTENDED RELEASE ORAL at 21:15

## 2022-10-24 RX ADMIN — SODIUM CHLORIDE 100 ML/HR: 9 INJECTION, SOLUTION INTRAVENOUS at 21:13

## 2022-10-24 RX ADMIN — LEVETIRACETAM 500 MG: 100 INJECTION INTRAVENOUS at 16:28

## 2022-10-24 RX ADMIN — DOXYCYCLINE 100 MG: 100 INJECTION, POWDER, LYOPHILIZED, FOR SOLUTION INTRAVENOUS at 18:31

## 2022-10-24 RX ADMIN — TEMAZEPAM 30 MG: 15 CAPSULE ORAL at 21:15

## 2022-10-24 NOTE — H&P
H[facility]ist History & Physical Notes. [facility] Name : Rika Bruner      MRN number : [id number]     YOB: 1965     Subjective :   Chief Complaint : Seizures    Source of information : Patient's brother at bedside, previous records and emergency room physician. Patient unable to provide information    History of present illness:   59 y.o. male with a history of schizophrenia, depression and multiple psychiatric medications is brought to the emergency room early morning around 1:00 as brother found that he has some seizure-like activity when he was sitting. It lasted only 2 minutes but brought him for evaluation, as he was doing fairly okay and work-up was negative, was discharged home this morning. But again around 14:45 just before presentation to the emergency room had another seizure that lasted a minute. So brought him again to the emergency room, while in the waiting room he had a seizure that was witnessed by the .  Immediately he was brought inside, and ED physician felt he has some postictal status. Did not have any more episodes in the emergency room but due to new onset seizures that were recurrent admitted for further evaluation. He is on Wellbutrin first long time, he was taking only once a day, recently was increased to twice a day. Also Cogentin was changed to [employer]. Haldol was also changed to [facility] -this changes was done almost 2 months ago. At baseline patient is little withdrawn, intellectual deficits requires supervised care. States 1 point they had to feed him, with medications he was showing  much improvement. Past Medical History:   Diagnosis Date    Anxiety     Depression     Intellectual disability     Schizoaffective disorder, bipolar type ([facility] [address]     Schizophrenia ([facility]      History reviewed. No pertinent surgical history.   Family History   Problem Relation Age of Onset    Cancer Father         bone Pancreatic Cancer Father     Dementia Mother       Social History     Tobacco Use    Smoking status: Never    Smokeless tobacco: Never   Substance Use Topics    Alcohol use: Never       Prior to Admission medications    Medication Sig Start Date End Date Taking? Authorizing Provider   valbenazine (Ingrezza) 60 mg cap Take 1 Capsule by mouth nightly. Yes Other, MD Adithya   potassium chloride (KAON 10%) 20 mEq/15 mL solution Take 15 mL by mouth daily. 8/25/22  Yes Woodridge Shirts, RAMIREZ   haloperidol decanoate (HALDOL DECANOATE) 100 mg/mL injection INJECT 100 MG (1 ML) INTRAMUSCULARLY EVERY 14 DAYS. 6/1/22  Yes Israel Castro, RAMIREZ   lithium carbonate SR (LITHOBID) 300 mg CR tablet Take 300 mg by mouth three (3) times daily. 5/3/22  Yes Provider, Historical   buPROPion XL (WELLBUTRIN XL) 150 mg tablet Take 1 Tablet by mouth nightly. 9/10/21  Yes Howard Woodson MD   temazepam (RESTORIL) 30 mg capsule Take 30 mg by mouth nightly. 9/29/22   Provider, Historical   haloperidoL (HALDOL) 2 mg tablet Take 2 mg by mouth daily. 9/25/22   Provider, Historical   Latuda 20 mg tab tablet Take 20 mg by mouth daily. 7/18/22   Provider, Historical   Invega 1.5 mg ER tablet Take 1.5 mg by mouth daily. 10/19/22   Provider, Historical     No Known Allergies          Review of Systems: To get any information from patient due to his intellectual disability    Vitals:   Patient Vitals for the past 12 hrs:   Temp Pulse Resp BP SpO2   10/24/22 1736 -- 78 14 120/87 96 %   10/24/22 1641 98.2 °F (36.8 °C) 76 16 128/83 95 %   10/24/22 1636 -- 76 16 128/83 95 %   10/24/22 1621 -- 85 19 131/81 94 %       Physical Exam:   General : Laying in the bed comfortably. HEENT : PERRLA, normal oral mucosa, atraumatic normocephalic, Normal ear and nose. Neck : Supple, no JVD, no masses noted, no carotid bruit. Lungs : Breath sounds with good air entry bilaterally, no wheezes or rales, no accessory muscle use.   CVS : Rhythm rate regular, S1+, S2+, no murmur or gallop. Abdomen : Soft, nontender,  bowel sounds active. Extremities : No edema noted,  pedal pulses not palpable. Musculoskeletal :, no joint swelling or effusion, mild muscle stiffness noted with increased tone. Skin : Moist, warm, , no pathological rash. Lymphatic : No cervical lymphadenopathy. Neurological : Awake, alert,   Psychiatric : No behavior disturbance noted. Data Review:   Recent Results (from the past 24 hour(s))   CBC WITH AUTOMATED DIFF    Collection Time: 10/24/22  2:19 AM   Result Value Ref Range    WBC 9.3 4.1 - 11.1 K/uL    RBC 4.48 4.10 - 5.70 M/uL    HGB 13.5 12.1 - 17.0 g/dL    HCT 39.9 36.6 - 50.3 %    MCV 89.1 80.0 - 99.0 FL    MCH 30.1 26.0 - 34.0 PG    MCHC 33.8 30.0 - 36.5 g/dL    RDW 12.7 11.5 - 14.5 %    PLATELET 678 313 - 464 K/uL    MPV 9.9 8.9 - 12.9 FL    NRBC 0.0 0.0  WBC    ABSOLUTE NRBC 0.00 0.00 - 0.01 K/uL    NEUTROPHILS 79 (H) 32 - 75 %    LYMPHOCYTES 10 (L) 12 - 49 %    MONOCYTES 8 5 - 13 %    EOSINOPHILS 2 0 - 7 %    BASOPHILS 0 0 - 1 %    IMMATURE GRANULOCYTES 1 (H) 0 - 0.5 %    ABS. NEUTROPHILS 7.4 1.8 - 8.0 K/UL    ABS. LYMPHOCYTES 0.9 0.8 - 3.5 K/UL    ABS. MONOCYTES 0.8 0.0 - 1.0 K/UL    ABS. EOSINOPHILS 0.1 0.0 - 0.4 K/UL    ABS. BASOPHILS 0.0 0.0 - 0.1 K/UL    ABS. IMM. GRANS. 0.1 (H) 0.00 - 0.04 K/UL    DF AUTOMATED     METABOLIC PANEL, COMPREHENSIVE    Collection Time: 10/24/22  2:19 AM   Result Value Ref Range    Sodium 137 136 - 145 mmol/L    Potassium 4.2 3.5 - 5.1 mmol/L    Chloride 108 97 - 108 mmol/L    CO2 24 21 - 32 mmol/L    Anion gap 5 5 - 15 mmol/L    Glucose 119 (H) 65 - 100 mg/dL    BUN 16 6 - 20 mg/dL    Creatinine 0.83 0.70 - 1.30 mg/dL    BUN/Creatinine ratio 19 12 - 20      eGFR >60 >60 ml/min/1.73m2    Calcium 9.0 8.5 - 10.1 mg/dL    Bilirubin, total 0.2 0.2 - 1.0 mg/dL    AST (SGOT) 21 15 - 37 U/L    ALT (SGPT) 26 12 - 78 U/L    Alk.  phosphatase 62 45 - 117 U/L    Protein, total 6.4 6.4 - 8.2 g/dL    Albumin 3.3 (L) 3.5 - 5.0 g/dL    Globulin 3.1 2.0 - 4.0 g/dL    A-G Ratio 1.1 1.1 - 2.2     MAGNESIUM    Collection Time: 10/24/22  2:19 AM   Result Value Ref Range    Magnesium 2.2 1.6 - 2.4 mg/dL   SALICYLATE    Collection Time: 10/24/22  2:19 AM   Result Value Ref Range    Salicylate level <3.6 (L) 2.8 - 20.0 mg/dL   ACETAMINOPHEN    Collection Time: 10/24/22  2:19 AM   Result Value Ref Range    Acetaminophen level <10 (L) 10 - 30 ug/mL   METABOLIC PANEL, COMPREHENSIVE    Collection Time: 10/24/22  3:58 PM   Result Value Ref Range    Sodium 142 136 - 145 mmol/L    Potassium 3.9 3.5 - 5.1 mmol/L    Chloride 110 (H) 97 - 108 mmol/L    CO2 20 (L) 21 - 32 mmol/L    Anion gap 12 5 - 15 mmol/L    Glucose 116 (H) 65 - 100 mg/dL    BUN 15 6 - 20 mg/dL    Creatinine 1.20 0.70 - 1.30 mg/dL    BUN/Creatinine ratio 13 12 - 20      eGFR >60 >60 ml/min/1.73m2    Calcium 9.7 8.5 - 10.1 mg/dL    Bilirubin, total 0.4 0.2 - 1.0 mg/dL    AST (SGOT) 15 15 - 37 U/L    ALT (SGPT) 28 12 - 78 U/L    Alk. phosphatase 69 45 - 117 U/L    Protein, total 7.0 6.4 - 8.2 g/dL    Albumin 3.8 3.5 - 5.0 g/dL    Globulin 3.2 2.0 - 4.0 g/dL    A-G Ratio 1.2 1.1 - 2.2     CBC WITH AUTOMATED DIFF    Collection Time: 10/24/22  3:58 PM   Result Value Ref Range    WBC 10.4 4.1 - 11.1 K/uL    RBC 4.87 4.10 - 5.70 M/uL    HGB 14.5 12.1 - 17.0 g/dL    HCT 44.4 36.6 - 50.3 %    MCV 91.2 80.0 - 99.0 FL    MCH 29.8 26.0 - 34.0 PG    MCHC 32.7 30.0 - 36.5 g/dL    RDW 13.0 11.5 - 14.5 %    PLATELET 454 575 - 718 K/uL    MPV 10.1 8.9 - 12.9 FL    NRBC 0.0 0.0  WBC    ABSOLUTE NRBC 0.00 0.00 - 0.01 K/uL    NEUTROPHILS 66 32 - 75 %    LYMPHOCYTES 22 12 - 49 %    MONOCYTES 9 5 - 13 %    EOSINOPHILS 2 0 - 7 %    BASOPHILS 0 0 - 1 %    IMMATURE GRANULOCYTES 1 (H) 0 - 0.5 %    ABS. NEUTROPHILS 6.9 1.8 - 8.0 K/UL    ABS. LYMPHOCYTES 2.3 0.8 - 3.5 K/UL    ABS. MONOCYTES 1.0 0.0 - 1.0 K/UL    ABS. EOSINOPHILS 0.2 0.0 - 0.4 K/UL    ABS. BASOPHILS 0.0 0.0 - 0.1 K/UL    ABS. IMM.  GRANS. 0.1 (H) 0.00 - 0.04 K/UL    DF AUTOMATED     MAGNESIUM    Collection Time: 10/24/22  3:58 PM   Result Value Ref Range    Magnesium 2.1 1.6 - 2.4 mg/dL   ETHYL ALCOHOL    Collection Time: 10/24/22  3:58 PM   Result Value Ref Range    ALCOHOL(ETHYL),SERUM <10 <10 mg/dL   TROPONIN-HIGH SENSITIVITY    Collection Time: 10/24/22  4:24 PM   Result Value Ref Range    Troponin-High Sensitivity 7 0 - 76 ng/L   LITHIUM    Collection Time: 10/24/22  4:24 PM   Result Value Ref Range    Lithium level 1.00 0.60 - 1.20 mmol/L   AMMONIA    Collection Time: 10/24/22  4:24 PM   Result Value Ref Range    Ammonia, plasma 23 <32 umol/L   PROTHROMBIN TIME + INR    Collection Time: 10/24/22  4:24 PM   Result Value Ref Range    Prothrombin time 12.7 11.9 - 14.6 sec    INR 0.9 0.9 - 1.1     PTT    Collection Time: 10/24/22  4:24 PM   Result Value Ref Range    aPTT 24.8 21.2 - 34.1 sec    aPTT, therapeutic range   82 - 109 sec       Radiologic Studies :   CT Results  (Last 48 hours)                 10/24/22 0321  CT HEAD WO CONT Final result    Impression:  No acute intracranial process seen. Ventriculomegaly, compatible with central   atrophy       Narrative:  EXAM: CT HEAD WO CONT       INDICATION: first time seizure       COMPARISON: 6/9/2021. CONTRAST: None. TECHNIQUE: Unenhanced CT of the head was performed using 5 mm images. Brain and   bone windows were generated. Coronal and sagittal reformats. CT dose reduction   was achieved through use of a standardized protocol tailored for this   examination and automatic exposure control for dose modulation. FINDINGS:   The ventricles and sulci are prominent but symmetric and stable in size, shape   and configuration. . There is no significant white matter disease. There is no   intracranial hemorrhage, extra-axial collection, or mass effect. The basilar   cisterns are open. No CT evidence of acute infarct. The bone windows demonstrate no abnormalities.  The visualized portions of the   paranasal sinuses and mastoid air cells are clear. CXR Results  (Last 48 hours)                 10/24/22 1655  XR CHEST PORT Final result    Impression:  Left lower airspace disease. Narrative:  EXAM: Portable CXR. 1654 hours. INDICATION: Seizure; cough       FINDINGS:   The lungs show left lower airspace disease. Heart is normal in size. There is no   pulmonary edema. There is no evident pneumothorax or pleural effusion. Assessment and Plan :     New onset seizures recurrent: Etiology unclear, further evaluation needed medications versus other neurological pathology. We will consult neurology and psychiatry to help with the evaluation of this patient. History of schizoaffective disorder on multiple medications, only Wellbutrin I am holding, we will continue all other medications with no changes. Admitted to ICU, full CODE STATUS, home medications reviewed and verified with list from the brother. Patient cannot make any decisions with understanding, family will make decisions for him. CC : Silverio Acevedo NP  Signed By: Jeimy Zambraon MD     October 24, 2022      This dictation was done by dragon, computer voice recognition software. Often unanticipated grammatical, syntax, [place] phones and other interpretive errors are inadvertently transcribed. Please excuse errors that have escaped final proofreading.

## 2022-10-24 NOTE — ED PROVIDER NOTES
EMERGENCY DEPARTMENT HISTORY AND PHYSICAL EXAM      Date: 10/24/2022  Patient Name: Jatin Christopher      History of Presenting Illness     Chief Complaint   Patient presents with    Seizure       History Provided By: Patient and Patient's Brother    Location/Duration/Severity/Modifying factors   Is a 75-year-old male who presents with reports of a seizure. Patient with history of schizophrenia on numerous medications. Patient's brother provides most of the history. The patient was seen in the early hours of the morning for reported seizure. There are no published notes at this time as it was less than 24 hours ago for review. Reports a couple of minutes at home of generalized convulsions. The brother states was slightly confused afterwards. He presented last night he had laboratory studies and a CT brain which were unremarkable patient was discharged. Mother states that at 245 this afternoon, about 1 hour before coming in he had a another seizure lasting about 1 minute. While in the waiting room he had a subsequent seizure here. Patient has not had any new complaints in the last few days. There has been no head trauma patient has not had a fever. He has had a mild cough if anything her brother states. Interestingly the patient has had some medication changes in the last few months. The patient has been on Wellbutrin for quite some time. The brother states he takes it twice per day there is been no changes with that. He also takes lithium which has been unchanged. He was changed from Cogentin to valbenazine about 2 months ago. They also changed his oral Haldol to [place]. The brother states that the patient has a typically very withdrawn affect. Mother notes that the patient has not really had any exacerbations of his chronic psychiatric disease in the past few days to weeks and has been stable. There are no other complaints, changes, or physical findings at this time.     PCP: Laverne Dodd, NP    Current Facility-Administered Medications   Medication Dose Route Frequency Provider Last Rate Last Admin    cefTRIAXone (ROCEPHIN) 2 g in sterile water (preservative free) 20 mL IV syringe  2 g IntraVENous Q24H Paz Hart K, DO   2 g at 10/24/22 1830    doxycycline (VIBRAMYCIN) 100 mg in 0.9% sodium chloride (MBP/ADV) 100 mL MBP  100 mg IntraVENous Q12H Paz Valentinnet K,  mL/hr at 10/25/22 1959 100 mg at 10/25/22 4652    temazepam (RESTORIL) capsule 30 mg  30 mg Oral QHS Marcus Castillo MD   30 mg at 10/24/22 2115    haloperidoL (HALDOL) tablet 2 mg  2 mg Oral DAILY Marcus Castillo MD        potassium chloride SR (KLOR-CON 10) tablet 10 mEq  10 mEq Oral DAILY Marcus Castillo MD        .PHARMACY TO SUBSTITUTE PER PROTOCOL (Reordered from: valbenazine (Ingrezza) 60 mg cap)    Per Protocol Marcus Castillo MD        0.9% sodium chloride infusion  100 mL/hr IntraVENous CONTINUOUS Marcus Castillo  mL/hr at 10/24/22 2302 100 mL/hr at 10/24/22 2302    sodium chloride (NS) flush 5-40 mL  5-40 mL IntraVENous Q8H Marcus Castillo MD   10 mL at 10/25/22 2592    sodium chloride (NS) flush 5-40 mL  5-40 mL IntraVENous PRN Marcus Castillo MD        LORazepam (ATIVAN) injection 1 mg  1 mg IntraVENous PRN Marcus Castillo MD        docusate sodium (COLACE) capsule 100 mg  100 mg Oral BID Marcus Castillo MD   100 mg at 10/24/22 2115    enoxaparin (LOVENOX) injection 40 mg  40 mg SubCUTAneous Q24H Marcus Castillo MD   40 mg at 10/24/22 2116    FLUoxetine (PROzac) capsule 40 mg  40 mg Oral DAILY Marcus Castillo MD        FLUoxetine (PROzac) capsule 20 mg  20 mg Oral QHS Marcus Castillo MD   20 mg at 10/25/22 0039    risperiDONE (RisperDAL) tablet 3 mg  3 mg Oral BID Marcus Castillo MD   3 mg at 10/25/22 0039    lithium carbonate tablet 300 mg  300 mg Oral QHS Marcus Castillo MD   300 mg at 10/25/22 0038    [START ON 10/26/2022] lithium carbonate SR (LITHOBID) tablet 600 mg  600 mg Oral DAILY Uriel Bonner MD           Past History     Past Medical History:  Past Medical History:   Diagnosis Date    Anxiety     Depression     Intellectual disability     Schizoaffective disorder, bipolar type ([facility] [address]     [facility]        Past Surgical History:  History reviewed. No pertinent surgical history. Family History:  Family History   Problem Relation Age of Onset    Cancer Father         bone    Pancreatic Cancer Father     Dementia Mother        Social History:  Social History     Tobacco Use    Smoking status: Never    Smokeless tobacco: Never   Vaping Use    Vaping Use: Never used   Substance Use Topics    Alcohol use: Never    Drug use: Never       Allergies:  No Known Allergies      Review of Systems     Review of Systems   Constitutional:  Negative for fever. HENT:  Negative for congestion and rhinorrhea. Eyes:  Negative for visual disturbance. Respiratory:  Positive for cough. Negative for shortness of breath and wheezing. Cardiovascular:  Negative for chest pain. Gastrointestinal:  Negative for abdominal pain, diarrhea, nausea and vomiting. Genitourinary:  Negative for dysuria, frequency and urgency. Musculoskeletal:  Negative for myalgias. Skin:  Negative for rash. Neurological:  Positive for seizures. Negative for headaches. Physical Exam     Physical Exam  Constitutional:       General: He is not in acute distress. Appearance: Normal appearance. He is normal weight. He is not ill-appearing or toxic-appearing. Comments: Appears chronically ill. Nontoxic. Afebrile   HENT:      Head: Normocephalic and atraumatic. Right Ear: External ear normal.      Left Ear: External ear normal.      Nose: Nose normal. No congestion or rhinorrhea. Mouth/Throat:      Mouth: Mucous membranes are moist.      Pharynx: Oropharynx is clear.  No oropharyngeal exudate or posterior oropharyngeal erythema. Eyes:      Conjunctiva/sclera: Conjunctivae normal.      Pupils: Pupils are equal, round, and reactive to light. Neck:      Comments: No meningismus. Cardiovascular:      Rate and Rhythm: Normal rate and regular rhythm. Pulses: Normal pulses. Heart sounds: Normal heart sounds. No murmur heard. No friction rub. Pulmonary:      Effort: Pulmonary effort is normal.      Breath sounds: Normal breath sounds. No wheezing, rhonchi or rales. Abdominal:      General: Abdomen is flat. Tenderness: There is no abdominal tenderness. There is no guarding or rebound. Musculoskeletal:         General: No swelling or tenderness. Normal range of motion. Cervical back: Normal range of motion and neck supple. Right lower leg: No edema. Left lower leg: No edema. Skin:     General: Skin is warm and dry. Capillary Refill: Capillary refill takes less than 2 seconds. Neurological:      General: No focal deficit present. Mental Status: He is alert. Motor: No weakness. Comments: Patient is very delayed and slow to respond very withdrawn affect. His smile is symmetric. He has global weakness in his arms and legs with vigorous prompting he has symmetric strength and he is able to move both feet symmetrically. Lab and Diagnostic Study Results     Labs -  Recent Results (from the past 24 hour(s))   METABOLIC PANEL, COMPREHENSIVE    Collection Time: 10/24/22  3:58 PM   Result Value Ref Range    Sodium 142 136 - 145 mmol/L    Potassium 3.9 3.5 - 5.1 mmol/L    Chloride 110 (H) 97 - 108 mmol/L    CO2 20 (L) 21 - 32 mmol/L    Anion gap 12 5 - 15 mmol/L    Glucose 116 (H) 65 - 100 mg/dL    BUN 15 6 - 20 mg/dL    Creatinine 1.20 0.70 - 1.30 mg/dL    BUN/Creatinine ratio 13 12 - 20      eGFR >60 >60 ml/min/1.73m2    Calcium 9.7 8.5 - 10.1 mg/dL    Bilirubin, total 0.4 0.2 - 1.0 mg/dL    AST (SGOT) 15 15 - 37 U/L    ALT (SGPT) 28 12 - 78 U/L    Alk. phosphatase 69 45 - 117 U/L    Protein, total 7.0 6.4 - 8.2 g/dL    Albumin 3.8 3.5 - 5.0 g/dL    Globulin 3.2 2.0 - 4.0 g/dL    A-G Ratio 1.2 1.1 - 2.2     CBC WITH AUTOMATED DIFF    Collection Time: 10/24/22  3:58 PM   Result Value Ref Range    WBC 10.4 4.1 - 11.1 K/uL    RBC 4.87 4.10 - 5.70 M/uL    HGB 14.5 12.1 - 17.0 g/dL    HCT 44.4 36.6 - 50.3 %    MCV 91.2 80.0 - 99.0 FL    MCH 29.8 26.0 - 34.0 PG    MCHC 32.7 30.0 - 36.5 g/dL    RDW 13.0 11.5 - 14.5 %    PLATELET 939 473 - 214 K/uL    MPV 10.1 8.9 - 12.9 FL    NRBC 0.0 0.0  WBC    ABSOLUTE NRBC 0.00 0.00 - 0.01 K/uL    NEUTROPHILS 66 32 - 75 %    LYMPHOCYTES 22 12 - 49 %    MONOCYTES 9 5 - 13 %    EOSINOPHILS 2 0 - 7 %    BASOPHILS 0 0 - 1 %    IMMATURE GRANULOCYTES 1 (H) 0 - 0.5 %    ABS. NEUTROPHILS 6.9 1.8 - 8.0 K/UL    ABS. LYMPHOCYTES 2.3 0.8 - 3.5 K/UL    ABS. MONOCYTES 1.0 0.0 - 1.0 K/UL    ABS. EOSINOPHILS 0.2 0.0 - 0.4 K/UL    ABS. BASOPHILS 0.0 0.0 - 0.1 K/UL    ABS. IMM. GRANS. 0.1 (H) 0.00 - 0.04 K/UL    DF AUTOMATED     MAGNESIUM    Collection Time: 10/24/22  3:58 PM   Result Value Ref Range    Magnesium 2.1 1.6 - 2.4 mg/dL   ETHYL ALCOHOL    Collection Time: 10/24/22  3:58 PM   Result Value Ref Range    ALCOHOL(ETHYL),SERUM <10 <10 mg/dL   EKG, 12 LEAD, INITIAL    Collection Time: 10/24/22  4:23 PM   Result Value Ref Range    Ventricular Rate 84 BPM    Atrial Rate 84 BPM    P-R Interval 208 ms    QRS Duration 100 ms    Q-T Interval 410 ms    QTC Calculation (Bezet) 484 ms    Calculated P Axis 30 degrees    Calculated R Axis -10 degrees    Calculated T Axis 17 degrees    Diagnosis       Normal sinus rhythm  Prolonged QT  Abnormal ECG  When compared with ECG of 20-OCT-2022 17:33, (Unconfirmed)  Sinus rhythm has replaced Atrial fibrillation  Vent.  rate has decreased BY  68 BPM  ST no longer depressed in Anterolateral leads  T wave inversion no longer evident in Lateral leads  Confirmed by Yash Bernabe ([id number]) on 10/24/2022 9:44:23 PM TROPONIN-HIGH SENSITIVITY    Collection Time: 10/24/22  4:24 PM   Result Value Ref Range    Troponin-High Sensitivity 7 0 - 76 ng/L   LITHIUM    Collection Time: 10/24/22  4:24 PM   Result Value Ref Range    Lithium level 1.00 0.60 - 1.20 mmol/L   AMMONIA    Collection Time: 10/24/22  4:24 PM   Result Value Ref Range    Ammonia, plasma 23 <32 umol/L   PROTHROMBIN TIME + INR    Collection Time: 10/24/22  4:24 PM   Result Value Ref Range    Prothrombin time 12.7 11.9 - 14.6 sec    INR 0.9 0.9 - 1.1     PTT    Collection Time: 10/24/22  4:24 PM   Result Value Ref Range    aPTT 24.8 21.2 - 34.1 sec    aPTT, therapeutic range   82 - 109 sec   CK    Collection Time: 10/24/22  4:24 PM   Result Value Ref Range    CK 99 39 - 308 U/L   TSH 3RD GENERATION    Collection Time: 10/24/22  4:24 PM   Result Value Ref Range    TSH 2.35 0.36 - 3.74 uIU/mL   URINALYSIS W/ REFLEX CULTURE    Collection Time: 10/24/22  7:08 PM    Specimen: Urine   Result Value Ref Range    Color Yellow/Straw      Appearance Clear Clear      Specific gravity 1.008 1.003 - 1.030      pH (UA) 7.0 5.0 - 8.0      Protein Negative Negative mg/dL    Glucose Negative Negative mg/dL    Ketone Negative Negative mg/dL    Bilirubin Negative Negative      Blood Small (A) Negative      Urobilinogen 0.1 0.1 - 1.0 EU/dL    Nitrites Negative Negative      Leukocyte Esterase Negative Negative      UA:UC IF INDICATED Culture not indicated by UA result Culture not indicated by UA result      WBC 0-4 0 - 4 /hpf    RBC 0-5 0 - 5 /hpf    Bacteria Negative Negative /hpf   DRUG SCREEN, URINE    Collection Time: 10/24/22  7:08 PM   Result Value Ref Range    AMPHETAMINES Negative Negative      BARBITURATES Negative Negative      BENZODIAZEPINES Negative Negative      COCAINE Negative Negative      METHADONE Negative Negative      OPIATES Negative Negative      PCP(PHENCYCLIDINE) Negative Negative      THC (TH-CANNABINOL) Negative Negative      Drug screen comment        This test is a screen for drugs of abuse in a medical setting only (i.e., they are unconfirmed results and as such must not be used for non-medical purposes, e.g.,employment testing, legal testing). Due to its inherent nature, false positive (FP) and false negative (FN) results may be obtained. Therefore, if necessary for medical care, recommend confirmation of positive findings by GC/MS. COVID-19 RAPID TEST    Collection Time: 10/24/22  7:59 PM   Result Value Ref Range    COVID-19 rapid test Not Detected Not Detected     MRSA SCREEN - PCR (NASAL)    Collection Time: 10/24/22 10:45 PM   Result Value Ref Range    MRSA by PCR, Nasal Not Detected Not Detected     METABOLIC PANEL, COMPREHENSIVE    Collection Time: 10/25/22  4:40 AM   Result Value Ref Range    Sodium 142 136 - 145 mmol/L    Potassium 3.4 (L) 3.5 - 5.1 mmol/L    Chloride 113 (H) 97 - 108 mmol/L    CO2 25 21 - 32 mmol/L    Anion gap 4 (L) 5 - 15 mmol/L    Glucose 95 65 - 100 mg/dL    BUN 10 6 - 20 mg/dL    Creatinine 0.94 0.70 - 1.30 mg/dL    BUN/Creatinine ratio 11 (L) 12 - 20      eGFR >60 >60 ml/min/1.73m2    Calcium 8.2 (L) 8.5 - 10.1 mg/dL    Bilirubin, total 0.5 0.2 - 1.0 mg/dL    AST (SGOT) 14 (L) 15 - 37 U/L    ALT (SGPT) 22 12 - 78 U/L    Alk.  phosphatase 55 45 - 117 U/L    Protein, total 5.7 (L) 6.4 - 8.2 g/dL    Albumin 2.8 (L) 3.5 - 5.0 g/dL    Globulin 2.9 2.0 - 4.0 g/dL    A-G Ratio 1.0 (L) 1.1 - 2.2     MAGNESIUM    Collection Time: 10/25/22  4:40 AM   Result Value Ref Range    Magnesium 2.0 1.6 - 2.4 mg/dL   CBC WITH AUTOMATED DIFF    Collection Time: 10/25/22  4:40 AM   Result Value Ref Range    WBC 8.1 4.1 - 11.1 K/uL    RBC 4.00 (L) 4.10 - 5.70 M/uL    HGB 12.2 12.1 - 17.0 g/dL    HCT 36.0 (L) 36.6 - 50.3 %    MCV 90.0 80.0 - 99.0 FL    MCH 30.5 26.0 - 34.0 PG    MCHC 33.9 30.0 - 36.5 g/dL    RDW 12.8 11.5 - 14.5 %    PLATELET 828 847 - 372 K/uL    MPV 10.1 8.9 - 12.9 FL    NRBC 0.0 0.0  WBC    ABSOLUTE NRBC 0.00 0.00 - 0.01 K/uL NEUTROPHILS 65 32 - 75 %    LYMPHOCYTES 22 12 - 49 %    MONOCYTES 10 5 - 13 %    EOSINOPHILS 2 0 - 7 %    BASOPHILS 0 0 - 1 %    IMMATURE GRANULOCYTES 1 (H) 0 - 0.5 %    ABS. NEUTROPHILS 5.3 1.8 - 8.0 K/UL    ABS. LYMPHOCYTES 1.8 0.8 - 3.5 K/UL    ABS. MONOCYTES 0.8 0.0 - 1.0 K/UL    ABS. EOSINOPHILS 0.1 0.0 - 0.4 K/UL    ABS. BASOPHILS 0.0 0.0 - 0.1 K/UL    ABS. IMM. GRANS. 0.1 (H) 0.00 - 0.04 K/UL    DF AUTOMATED           Radiologic Studies -   XR CHEST PORT   Final Result   Left lower airspace disease. Medical Decision Making and ED Course   - I am the first and primary provider for this patient AND AM THE PRIMARY PROVIDER OF RECORD. - I reviewed the vital signs, available nursing notes, past medical history, past surgical history, family history and social history. - Initial assessment performed. The patients presenting problems have been discussed, and the staff are in agreement with the care plan formulated and outlined with them. I have encouraged them to ask questions as they arise throughout their visit. Vital Signs-Reviewed the patient's vital signs. Patient Vitals for the past 12 hrs:   Temp Pulse Resp BP SpO2   10/25/22 0514 -- (!) 54 13 (!) 91/55 99 %   10/25/22 0500 -- (!) 55 13 (!) 91/55 100 %   10/25/22 0414 -- (!) 58 8 92/60 100 %   10/25/22 0400 -- (!) 57 12 (!) 91/58 100 %   10/25/22 0300 98.2 °F (36.8 °C) (!) 54 13 (!) 88/59 100 %   10/25/22 0200 -- (!) 56 13 99/67 100 %   10/25/22 0100 -- 65 14 128/79 100 %   10/25/22 0000 -- 82 19 121/85 99 %   10/24/22 2300 97.4 °F (36.3 °C) 75 25 (!) 128/100 97 %   10/24/22 2247 -- 81 15 (!) 136/100 98 %   10/24/22 2242 97.4 °F (36.3 °C) 81 15 (!) 135/97 98 %   10/24/22 2120 98.7 °F (37.1 °C) 80 18 (!) 132/94 97 %       EKG interpretation: Normal sinus rhythm, rate of 84. Normal NC, QRS intervals. Slightly prolonged QTC of 484. Normal axis. No ST segment elevation or depression.   Overall normal sinus rhythm and normal EKG.      Records Reviewed: Nursing Notes, Old Medical Records, Previous Radiology Studies, and Previous Laboratory Studies        Provider Notes (Medical Decision Making):     MDM  Number of Diagnoses or Management Options  History of schizophrenia  Seizure [facility])  Diagnosis management comments: 75-year-old male patient with extensive psychiatric history presenting for new onset of seizure. It was witnessed by staff in the waiting room although I did not see it myself. He has now had 3 seizures in the last 15 or 16 hours. No prior history of seizures brother denies any known family history. Suspect likely medication induced although he is on several medications will be difficult to determine. Wellbutrin certainly could be a culprit above as not a new medication. He also has been started on Ingrezza, and switched from oral Haldol to [place]. He is afebrile he has no recent complaints of any headache, neck stiffness or fever. Meningitis less likely encephalitis less likely. Ddx: Seizure, epilepsy, status epilepticus, electrolyte derangement, hypoglycemia, intracranial hemorrhage, intracranial mass, medication noncompliance, breakthrough seizure, etc.      We will check some basic labs to start with, he just had a CAT scan last night and the brother denies any head trauma so I think the likelihood of intracranial hemorrhage is low and there is really no indication to repeat it at this point. We will load him with 500 mg of Keppra for right now we will give him a dose of IV Ativan to prevent additional seizure activity given the frequency suggested by the brother is pretty often. Given recurrent seizures, including seizure in our lobby witnessed by staff, although not by me, patient should be admitted for close monitoring and neurology consult.  CRISSYw Nestor Humphrey               ED Course: ------------------------------------------------------------------------------------------------------------        Consultations:       Consultations: -  Hospitalist Consultant: Dr. Roger Metcalf: We have asked for emergent assistance with regard to this patient. We have discussed the patients HPI, ROS, PE and results this far. They will come and evaluate the patient for admission. Procedures and Critical Care       Performed by: Hector Arias DO    Procedures             CRITICAL CARE NOTE :  4:15 PM  CRITICAL CARE TIME: I have spent 48 minutes of critical care time involved in lab review, consultations with specialist, family decision-making, and documentation. During this entire length of time I was immediately available to the patient. Critical Care: The reason for providing this level of medical care for this critically ill patient was due a critical illness that impaired one or more vital organ systems such that there was a high probability of imminent or life threatening deterioration in the patients condition. This care involved high complexity decision making to assess, manipulate, and support vital system functions, to treat this vital organ system failure and to prevent further life threatening deterioration of the patients condition. Time is exclusive of separately billable procedures and teaching time. DO Hector Palm DO        Disposition         Diagnosis:   1. Seizure ([address]    2. History of schizophrenia          Disposition: Admitted    Follow-up Information    None         Current Discharge Medication List        CONTINUE these medications which have NOT CHANGED    Details   valbenazine (Ingrezza) 60 mg cap Take 1 Capsule by mouth nightly. Invega 1.5 mg ER tablet Take 1.5 mg by mouth daily. buPROPion SR (Wellbutrin SR) 150 mg SR tablet Take 150 mg by mouth two (2) times a day. !! FLUoxetine (PROzac) 20 mg capsule Take 40 mg by mouth daily. !! FLUoxetine (PROzac) 20 mg capsule Take 20 mg by mouth nightly. risperiDONE (RisperDAL) 3 mg tablet Take 3 mg by mouth two (2) times a day. lithium carbonate 300 mg capsule Take 300 mg by mouth nightly. potassium chloride (KAON 10%) 20 mEq/15 mL solution Take 15 mL by mouth daily. Qty: 480 mL, Refills: 3    Associated Diagnoses: Hypokalemia      haloperidol decanoate (HALDOL DECANOATE) 100 mg/mL injection INJECT 100 MG (1 ML) INTRAMUSCULARLY EVERY 14 DAYS. Qty: 1 mL, Refills: 5    Associated Diagnoses: Paranoid schizophrenia (HCC)      lithium carbonate SR (LITHOBID) 300 mg CR tablet Take 600 mg by mouth daily. temazepam (RESTORIL) 30 mg capsule Take 30 mg by mouth nightly. !! - Potential duplicate medications found. Please discuss with provider. STOP taking these medications       haloperidoL (HALDOL) 2 mg tablet Comments:   Reason for Stopping:                 Diagnosis     Clinical Impression:   1. Seizure ([address]    2. History of schizophrenia        Attestations:    Alia Dumas, DO    Please note that this dictation was completed with [employer], the [employer] voice recognition software. Quite often unanticipated grammatical, syntax, homophones, and other interpretive errors are inadvertently transcribed by the computer software. Please disregard these errors. Please excuse any errors that have escaped final proofreading. Thank you.

## 2022-10-24 NOTE — DISCHARGE INSTRUCTIONS
Please be sure to follow up with neurology, please return to the ED if there are any new issues or another seizure     Thank you! Thank you for allowing me to care for you in the emergency department. It is my goal to provide you with excellent care. If you have not received excellent quality care, please ask to speak to the nurse manager. Please fill out the survey that will come to you by mail or email since we listen to your feedback! Below you will find a list of your tests from today's visit. Should you have any questions, please do not hesitate to call the emergency department. Labs  Recent Results (from the past 12 hour(s))   CBC WITH AUTOMATED DIFF    Collection Time: 10/24/22  2:19 AM   Result Value Ref Range    WBC 9.3 4.1 - 11.1 K/uL    RBC 4.48 4.10 - 5.70 M/uL    HGB 13.5 12.1 - 17.0 g/dL    HCT 39.9 36.6 - 50.3 %    MCV 89.1 80.0 - 99.0 FL    MCH 30.1 26.0 - 34.0 PG    MCHC 33.8 30.0 - 36.5 g/dL    RDW 12.7 11.5 - 14.5 %    PLATELET 799 580 - 486 K/uL    MPV 9.9 8.9 - 12.9 FL    NRBC 0.0 0.0  WBC    ABSOLUTE NRBC 0.00 0.00 - 0.01 K/uL    NEUTROPHILS 79 (H) 32 - 75 %    LYMPHOCYTES 10 (L) 12 - 49 %    MONOCYTES 8 5 - 13 %    EOSINOPHILS 2 0 - 7 %    BASOPHILS 0 0 - 1 %    IMMATURE GRANULOCYTES 1 (H) 0 - 0.5 %    ABS. NEUTROPHILS 7.4 1.8 - 8.0 K/UL    ABS. LYMPHOCYTES 0.9 0.8 - 3.5 K/UL    ABS. MONOCYTES 0.8 0.0 - 1.0 K/UL    ABS. EOSINOPHILS 0.1 0.0 - 0.4 K/UL    ABS. BASOPHILS 0.0 0.0 - 0.1 K/UL    ABS. IMM.  GRANS. 0.1 (H) 0.00 - 0.04 K/UL    DF AUTOMATED     METABOLIC PANEL, COMPREHENSIVE    Collection Time: 10/24/22  2:19 AM   Result Value Ref Range    Sodium 137 136 - 145 mmol/L    Potassium 4.2 3.5 - 5.1 mmol/L    Chloride 108 97 - 108 mmol/L    CO2 24 21 - 32 mmol/L    Anion gap 5 5 - 15 mmol/L    Glucose 119 (H) 65 - 100 mg/dL    BUN 16 6 - 20 mg/dL    Creatinine 0.83 0.70 - 1.30 mg/dL    BUN/Creatinine ratio 19 12 - 20      eGFR >60 >60 ml/min/1.73m2    Calcium 9.0 8.5 - 10.1 mg/dL    Bilirubin, total 0.2 0.2 - 1.0 mg/dL    AST (SGOT) 21 15 - 37 U/L    ALT (SGPT) 26 12 - 78 U/L    Alk. phosphatase 62 45 - 117 U/L    Protein, total 6.4 6.4 - 8.2 g/dL    Albumin 3.3 (L) 3.5 - 5.0 g/dL    Globulin 3.1 2.0 - 4.0 g/dL    A-G Ratio 1.1 1.1 - 2.2     MAGNESIUM    Collection Time: 10/24/22  2:19 AM   Result Value Ref Range    Magnesium 2.2 1.6 - 2.4 mg/dL   SALICYLATE    Collection Time: 10/24/22  2:19 AM   Result Value Ref Range    Salicylate level <8.1 (L) 2.8 - 20.0 mg/dL   ACETAMINOPHEN    Collection Time: 10/24/22  2:19 AM   Result Value Ref Range    Acetaminophen level <10 (L) 10 - 30 ug/mL       Radiologic Studies  CT HEAD WO CONT   Final Result   No acute intracranial process seen. Ventriculomegaly, compatible with central   atrophy        CT Results  (Last 48 hours)                 10/24/22 0321  CT HEAD WO CONT Final result    Impression:  No acute intracranial process seen. Ventriculomegaly, compatible with central   atrophy       Narrative:  EXAM: CT HEAD WO CONT       INDICATION: first time seizure       COMPARISON: 6/9/2021. CONTRAST: None. TECHNIQUE: Unenhanced CT of the head was performed using 5 mm images. Brain and   bone windows were generated. Coronal and sagittal reformats. CT dose reduction   was achieved through use of a standardized protocol tailored for this   examination and automatic exposure control for dose modulation. FINDINGS:   The ventricles and sulci are prominent but symmetric and stable in size, shape   and configuration. . There is no significant white matter disease. There is no   intracranial hemorrhage, extra-axial collection, or mass effect. The basilar   cisterns are open. No CT evidence of acute infarct. The bone windows demonstrate no abnormalities. The visualized portions of the   paranasal sinuses and mastoid air cells are clear.                  CXR Results  (Last 48 hours)      None ------------------------------------------------------------------------------------------------------------  The exam and treatment you received in the Emergency Department were for an urgent problem and are not intended as complete care. It is important that you follow-up with a doctor, nurse practitioner, or physician assistant to:  (1) confirm your diagnosis,  (2) re-evaluation of changes in your illness and treatment, and  (3) for ongoing care. Please take your discharge instructions with you when you go to your follow-up appointment. Also please be sure to review all images and lab results from your visit with your doctor. If you have any problem arranging a follow-up appointment, contact the Emergency Department. If your symptoms become worse or you do not improve as expected and you are unable to reach your health care provider, please return to the Emergency Department. We are available 24 hours a day. If a prescription has been provided, please have it filled as soon as possible to prevent a delay in treatment. If you have any questions or reservations about taking the medication due to side effects or interactions with other medications, please call your primary care provider or contact the ER.

## 2022-10-24 NOTE — ED NOTES
Care assumed and bedside SBAR report endorsed on [address] Pt cardiac monitoring continued , spO2 Monitoring continued, IV reassed, call bell within reach, side rails up x2, resting comfortable but easily arousable, no signs of acute distress. , bed in lowest position, MAR reviewed, Labs reviewed, will continue to monitor

## 2022-10-24 NOTE — ED TRIAGE NOTES
Family member brought patient in due to belief of having a seizure, \" he was shaking all over for about 2 mins\".  Patient has hx of schizophrenia, has been having many medication changes lately, brother gave cold medication tonight

## 2022-10-24 NOTE — ED PROVIDER NOTES
EMERGENCY DEPARTMENT HISTORY AND PHYSICAL EXAM      Date: 10/24/2022  Patient Name: Mitchell Christopher    History of Presenting Illness     Chief Complaint   Patient presents with    Seizure       History Provided By: Patient's Brother    HPI: [de-identified] T Candi, 59 y.o. male with hx of mental illness an schizophrenia on multiple medications including lithium, invega, risperidone presenting to the ED for a possible seizure. Per brother who is taking care of the patient, he witnessed the patient have a shaking episode that lasted approximately 2 minutes, no associated fall, states patient was sitting against a wall and began shaking. Brother held the patient protecting his head. After episode, patient was back to baseline - no reported confusion - and he was able to walk to the bathroom. Patient did not urinate on himself and no reported tongue bleeding. Patient did take some cold medicine recently (Nyquil and Dayquil) for recent viral illness. Brother brought patient in for evaluation of possible seizure. Per Brother patient currently at baseline and acting appropriately - patient has mental disability. There are no other complaints, changes, or physical findings at this time. PCP: Dore Merlin, NP    Current Facility-Administered Medications on File Prior to Encounter   Medication Dose Route Frequency Provider Last Rate Last Admin    haloperidol decanoate (HALDOL DECANOATE) 100 mg/mL LA injection 100 mg  100 mg IntraMUSCular Q 14 DAYS Dore Merlin, NP   100 mg at 10/18/22 1517     Current Outpatient Medications on File Prior to Encounter   Medication Sig Dispense Refill    ondansetron (ZOFRAN ODT) 4 mg disintegrating tablet Take 1 Tablet by mouth every eight (8) hours as needed for Nausea or Vomiting. (Patient not taking: Reported on 10/24/2022) 7 Tablet 0    potassium chloride (KAON 10%) 20 mEq/15 mL solution Take 15 mL by mouth daily.  480 mL 3    haloperidol decanoate (HALDOL DECANOATE) 100 mg/mL injection INJECT 100 MG (1 ML) INTRAMUSCULARLY EVERY 14 DAYS. 1 mL 5    haloperidoL (HALDOL) 5 mg tablet Take 1.25 mg by mouth daily. 2.5mg daily (Patient not taking: Reported on 10/24/2022)      lithium carbonate SR (LITHOBID) 300 mg CR tablet Take 300 mg by mouth three (3) times daily. benztropine (COGENTIN) 1 mg tablet Take 2 Tablets by mouth two (2) times a day. Taking 3mg Q AM and 1.5mg QPM (Patient not taking: Reported on 10/24/2022)      buPROPion XL (WELLBUTRIN XL) 150 mg tablet Take 1 Tablet by mouth nightly. 30 Tablet 0    risperiDONE (RisperDAL) 3 mg tablet Take 1 Tablet by mouth two (2) times a day. 60 Tablet 0       Past History     Past Medical History:  Past Medical History:   Diagnosis Date    Anxiety     Depression     Intellectual disability     Schizoaffective disorder, bipolar type ([facility] [address]     Schizophrenia ([facility] [address]        Past Surgical History:  No past surgical history on file. Family History:  Family History   Problem Relation Age of Onset    Cancer Father         bone    Pancreatic Cancer Father     Dementia Mother        Social History:  Social History     Tobacco Use    Smoking status: Never    Smokeless tobacco: Never   Vaping Use    Vaping Use: Never used   Substance Use Topics    Alcohol use: Never    Drug use: Never       Allergies:  No Known Allergies    Review of Systems   Review of Systems   Constitutional:  Negative for activity change, chills and fever. HENT:  Negative for drooling. Eyes:  Negative for redness. Respiratory:  Negative for chest tightness and shortness of breath. Cardiovascular:  Negative for chest pain. Gastrointestinal:  Negative for abdominal pain, nausea and vomiting. Musculoskeletal:  Negative for back pain and neck pain. Skin:  Negative for color change and wound. Neurological:  Positive for seizures. Negative for tremors, weakness and headaches. Psychiatric/Behavioral:  Negative for agitation. All other systems reviewed and are negative.     Physical Exam Physical Exam  Vitals and nursing note reviewed. Constitutional:       General: He is not in acute distress. Appearance: Normal appearance. HENT:      Head: Normocephalic. Comments: Tongue without laceration or bleeding     Nose: Nose normal.      Mouth/Throat:      Mouth: Mucous membranes are moist.   Eyes:      Extraocular Movements: Extraocular movements intact. Conjunctiva/sclera: Conjunctivae normal.      Pupils: Pupils are equal, round, and reactive to light. Cardiovascular:      Rate and Rhythm: Normal rate. Pulses: Normal pulses. Pulmonary:      Effort: Pulmonary effort is normal.      Breath sounds: Normal breath sounds. Abdominal:      General: Abdomen is flat. Tenderness: There is no abdominal tenderness. There is no guarding. Musculoskeletal:         General: No deformity. Cervical back: Neck supple. No rigidity. Skin:     General: Skin is warm. Neurological:      General: No focal deficit present. Mental Status: He is alert. Mental status is at baseline. Cranial Nerves: No cranial nerve deficit or facial asymmetry. Motor: No weakness, tremor, abnormal muscle tone or seizure activity.       Gait: Gait normal.   Psychiatric:         Mood and Affect: Mood normal.       Lab and Diagnostic Study Results   Labs -     Recent Results (from the past 12 hour(s))   CBC WITH AUTOMATED DIFF    Collection Time: 10/24/22  3:58 PM   Result Value Ref Range    WBC 10.4 4.1 - 11.1 K/uL    RBC 4.87 4.10 - 5.70 M/uL    HGB 14.5 12.1 - 17.0 g/dL    HCT 44.4 36.6 - 50.3 %    MCV 91.2 80.0 - 99.0 FL    MCH 29.8 26.0 - 34.0 PG    MCHC 32.7 30.0 - 36.5 g/dL    RDW 13.0 11.5 - 14.5 %    PLATELET 364 073 - 082 K/uL    MPV 10.1 8.9 - 12.9 FL    NRBC 0.0 0.0  WBC    ABSOLUTE NRBC 0.00 0.00 - 0.01 K/uL    NEUTROPHILS 66 32 - 75 %    LYMPHOCYTES 22 12 - 49 %    MONOCYTES 9 5 - 13 %    EOSINOPHILS 2 0 - 7 %    BASOPHILS 0 0 - 1 %    IMMATURE GRANULOCYTES 1 (H) 0 - 0.5 %    ABS. NEUTROPHILS 6.9 1.8 - 8.0 K/UL    ABS. LYMPHOCYTES 2.3 0.8 - 3.5 K/UL    ABS. MONOCYTES 1.0 0.0 - 1.0 K/UL    ABS. EOSINOPHILS 0.2 0.0 - 0.4 K/UL    ABS. BASOPHILS 0.0 0.0 - 0.1 K/UL    ABS. IMM. GRANS. 0.1 (H) 0.00 - 0.04 K/UL    DF AUTOMATED         Radiologic Studies -   @lastxrresult@  CT Results  (Last 48 hours)                 10/24/22 0321  CT HEAD WO CONT Final result    Impression:  No acute intracranial process seen. Ventriculomegaly, compatible with central   atrophy       Narrative:  EXAM: CT HEAD WO CONT       INDICATION: first time seizure       COMPARISON: 6/9/2021. CONTRAST: None. TECHNIQUE: Unenhanced CT of the head was performed using 5 mm images. Brain and   bone windows were generated. Coronal and sagittal reformats. CT dose reduction   was achieved through use of a standardized protocol tailored for this   examination and automatic exposure control for dose modulation. FINDINGS:   The ventricles and sulci are prominent but symmetric and stable in size, shape   and configuration. . There is no significant white matter disease. There is no   intracranial hemorrhage, extra-axial collection, or mass effect. The basilar   cisterns are open. No CT evidence of acute infarct. The bone windows demonstrate no abnormalities. The visualized portions of the   paranasal sinuses and mastoid air cells are clear. CXR Results  (Last 48 hours)      None            Medical Decision Making and ED Course   Differential Diagnosis & Medical Decision Making Provider Note:   62yM w/ hx of schizophrenia, mental disability presenting to ED for possible seizure. Ddx includes seizures, pseudoseizure, tremor, syncope. Exam is unremarkable and patient is neurologically intact and at baseline per brother, the patient does answer questions but only when asked twice and he responds that he is currently asymptomatic. He denies taking any extra doses of medications.  He does not have any symptoms or signs of acute overdose or chronci overdose, no head trauma to suggest cause of seizure. Will obtain labs and CT reassess. There is also not convincing evidence that a seizure took place given lack of urination, no tongue bite, no reported post ictal period.     - I am the first provider for this patient. I reviewed the vital signs, available nursing notes, past medical history, past surgical history, family history and social history. The patients presenting problems have been discussed, and they are in agreement with the care plan formulated and outlined with them. I have encouraged them to ask questions as they arise throughout their visit. Vital Signs-Reviewed the patient's vital signs. Patient Vitals for the past 12 hrs:   Temp Pulse Resp BP SpO2   10/24/22 0445 97.8 °F (36.6 °C) (!) 57 18 123/82 98 %       ED Course:        Labs and CT unremarkable. Patient continued to be at baseline, ambulatory - acting normal per brother. I instructed the brother that he needs prompt follow up for this - should hold taking flu and cold medications and call the patients doctor for follow up. Given the stable appearance of the patient do not feel need to admit for further workup. Brother understands and agrees with this plan. Procedures   Performed by: Gail De La Garza MD  Procedures      Disposition   Disposition: DC- Adult Discharges: All of the diagnostic tests were reviewed and questions answered. Diagnosis, care plan and treatment options were discussed. The patient understands the instructions and will follow up as directed. The patients results have been reviewed with them. They have been counseled regarding their diagnosis. The caregiver verbally convey understanding and agreement of the signs, symptoms, diagnosis, treatment and prognosis and additionally agrees to follow up as recommended with their PCP in 24 - 48 hours.   They also agree with the care-plan and convey that all of their questions have been answered. I have also put together some discharge instructions for them that include: 1) educational information regarding their diagnosis, 2) how to care for their diagnosis at home, as well a 3) list of reasons why they would want to return to the ED prior to their follow-up appointment, should their condition change. DISCHARGE PLAN:  1. Current Discharge Medication List        CONTINUE these medications which have NOT CHANGED    Details   ondansetron (ZOFRAN ODT) 4 mg disintegrating tablet Take 1 Tablet by mouth every eight (8) hours as needed for Nausea or Vomiting. Qty: 7 Tablet, Refills: 0      potassium chloride (KAON 10%) 20 mEq/15 mL solution Take 15 mL by mouth daily. Qty: 480 mL, Refills: 3    Associated Diagnoses: Hypokalemia      haloperidol decanoate (HALDOL DECANOATE) 100 mg/mL injection INJECT 100 MG (1 ML) INTRAMUSCULARLY EVERY 14 DAYS. Qty: 1 mL, Refills: 5    Associated Diagnoses: Paranoid schizophrenia (HCC)      haloperidoL (HALDOL) 5 mg tablet Take 1.25 mg by mouth daily. 2.5mg daily      lithium carbonate SR (LITHOBID) 300 mg CR tablet Take 300 mg by mouth three (3) times daily. benztropine (COGENTIN) 1 mg tablet Take 2 Tablets by mouth two (2) times a day. Taking 3mg Q AM and 1.5mg QPM      buPROPion XL (WELLBUTRIN XL) 150 mg tablet Take 1 Tablet by mouth nightly. Qty: 30 Tablet, Refills: 0      risperiDONE (RisperDAL) 3 mg tablet Take 1 Tablet by mouth two (2) times a day. Qty: 60 Tablet, Refills: 0           2. Follow-up Information       Follow up With Specialties Details Why Contact Janice Camilo NP Nurse Practitioner In 3 days  [address]  [phone] [address] [facility] Neurology Schedule an appointment as soon as possible for a visit in 3 days  [address] [place], [place] [address] [facility],[address] [id number] [phone]          3. Return to ED if worse   4.    Discharge Medication List as of 10/24/2022  4:43 AM            Diagnosis/Clinical Impression     Clinical Impression:   1. Seizure [facility])        Attestations: Carlos Young MD, am the primary clinician of record. Please note that this dictation was completed with [employer], the computer voice recognition software. Quite often unanticipated grammatical, syntax, homophones, and other interpretive errors are inadvertently transcribed by the computer software. Please disregard these errors. Please excuse any errors that have escaped final proofreading. Thank you.

## 2022-10-25 LAB
ALBUMIN SERPL-MCNC: 2.8 G/DL (ref 3.5–5)
ALBUMIN/GLOB SERPL: 1 {RATIO} (ref 1.1–2.2)
ALP SERPL-CCNC: 55 U/L (ref 45–117)
ALT SERPL-CCNC: 22 U/L (ref 12–78)
ANION GAP SERPL CALC-SCNC: 4 MMOL/L (ref 5–15)
AST SERPL W P-5'-P-CCNC: 14 U/L (ref 15–37)
BASOPHILS # BLD: 0 K/UL (ref 0–0.1)
BASOPHILS NFR BLD: 0 % (ref 0–1)
BILIRUB SERPL-MCNC: 0.5 MG/DL (ref 0.2–1)
BUN SERPL-MCNC: 10 MG/DL (ref 6–20)
BUN/CREAT SERPL: 11 (ref 12–20)
CA-I BLD-MCNC: 8.2 MG/DL (ref 8.5–10.1)
CHLORIDE SERPL-SCNC: 113 MMOL/L (ref 97–108)
CO2 SERPL-SCNC: 25 MMOL/L (ref 21–32)
CREAT SERPL-MCNC: 0.94 MG/DL (ref 0.7–1.3)
DIFFERENTIAL METHOD BLD: ABNORMAL
EOSINOPHIL # BLD: 0.1 K/UL (ref 0–0.4)
EOSINOPHIL NFR BLD: 2 % (ref 0–7)
ERYTHROCYTE [DISTWIDTH] IN BLOOD BY AUTOMATED COUNT: 12.8 % (ref 11.5–14.5)
GLOBULIN SER CALC-MCNC: 2.9 G/DL (ref 2–4)
GLUCOSE SERPL-MCNC: 95 MG/DL (ref 65–100)
HCT VFR BLD AUTO: 36 % (ref 36.6–50.3)
HGB BLD-MCNC: 12.2 G/DL (ref 12.1–17)
IMM GRANULOCYTES # BLD AUTO: 0.1 K/UL (ref 0–0.04)
IMM GRANULOCYTES NFR BLD AUTO: 1 % (ref 0–0.5)
LYMPHOCYTES # BLD: 1.8 K/UL (ref 0.8–3.5)
LYMPHOCYTES NFR BLD: 22 % (ref 12–49)
MAGNESIUM SERPL-MCNC: 2 MG/DL (ref 1.6–2.4)
MCH RBC QN AUTO: 30.5 PG (ref 26–34)
MCHC RBC AUTO-ENTMCNC: 33.9 G/DL (ref 30–36.5)
MCV RBC AUTO: 90 FL (ref 80–99)
MONOCYTES # BLD: 0.8 K/UL (ref 0–1)
MONOCYTES NFR BLD: 10 % (ref 5–13)
MRSA DNA SPEC QL NAA+PROBE: NOT DETECTED
NEUTS SEG # BLD: 5.3 K/UL (ref 1.8–8)
NEUTS SEG NFR BLD: 65 % (ref 32–75)
NRBC # BLD: 0 K/UL (ref 0–0.01)
NRBC BLD-RTO: 0 PER 100 WBC
PLATELET # BLD AUTO: 193 K/UL (ref 150–400)
PMV BLD AUTO: 10.1 FL (ref 8.9–12.9)
POTASSIUM SERPL-SCNC: 3.4 MMOL/L (ref 3.5–5.1)
PROT SERPL-MCNC: 5.7 G/DL (ref 6.4–8.2)
RBC # BLD AUTO: 4 M/UL (ref 4.1–5.7)
SODIUM SERPL-SCNC: 142 MMOL/L (ref 136–145)
WBC # BLD AUTO: 8.1 K/UL (ref 4.1–11.1)

## 2022-10-25 PROCEDURE — 74011000258 HC RX REV CODE- 258: Performed by: INTERNAL MEDICINE

## 2022-10-25 PROCEDURE — 74011250637 HC RX REV CODE- 250/637: Performed by: HOSPITALIST

## 2022-10-25 PROCEDURE — 74011250637 HC RX REV CODE- 250/637: Performed by: INTERNAL MEDICINE

## 2022-10-25 PROCEDURE — 80053 COMPREHEN METABOLIC PANEL: CPT

## 2022-10-25 PROCEDURE — 74011250636 HC RX REV CODE- 250/636: Performed by: HOSPITALIST

## 2022-10-25 PROCEDURE — 74011000250 HC RX REV CODE- 250: Performed by: HOSPITALIST

## 2022-10-25 PROCEDURE — 85025 COMPLETE CBC W/AUTO DIFF WBC: CPT

## 2022-10-25 PROCEDURE — 83735 ASSAY OF MAGNESIUM: CPT

## 2022-10-25 PROCEDURE — 74011250636 HC RX REV CODE- 250/636: Performed by: EMERGENCY MEDICINE

## 2022-10-25 PROCEDURE — 74011000258 HC RX REV CODE- 258: Performed by: EMERGENCY MEDICINE

## 2022-10-25 PROCEDURE — 74011250636 HC RX REV CODE- 250/636: Performed by: INTERNAL MEDICINE

## 2022-10-25 PROCEDURE — 36415 COLL VENOUS BLD VENIPUNCTURE: CPT

## 2022-10-25 PROCEDURE — 65270000029 HC RM PRIVATE

## 2022-10-25 RX ORDER — POTASSIUM CHLORIDE 750 MG/1
40 TABLET, FILM COATED, EXTENDED RELEASE ORAL
Status: COMPLETED | OUTPATIENT
Start: 2022-10-25 | End: 2022-10-25

## 2022-10-25 RX ADMIN — DOCUSATE SODIUM 100 MG: 100 CAPSULE, LIQUID FILLED ORAL at 09:31

## 2022-10-25 RX ADMIN — POTASSIUM CHLORIDE 40 MEQ: 750 TABLET, FILM COATED, EXTENDED RELEASE ORAL at 17:10

## 2022-10-25 RX ADMIN — PIPERACILLIN AND TAZOBACTAM 3.38 G: 3; .375 INJECTION, POWDER, FOR SOLUTION INTRAVENOUS at 17:10

## 2022-10-25 RX ADMIN — PIPERACILLIN AND TAZOBACTAM 4.5 G: 4; .5 INJECTION, POWDER, FOR SOLUTION INTRAVENOUS at 09:33

## 2022-10-25 RX ADMIN — HALOPERIDOL 2 MG: 1 TABLET ORAL at 10:00

## 2022-10-25 RX ADMIN — DOXYCYCLINE 100 MG: 100 INJECTION, POWDER, LYOPHILIZED, FOR SOLUTION INTRAVENOUS at 06:21

## 2022-10-25 RX ADMIN — RISPERIDONE 3 MG: 2 TABLET ORAL at 09:31

## 2022-10-25 RX ADMIN — SODIUM CHLORIDE, PRESERVATIVE FREE 10 ML: 5 INJECTION INTRAVENOUS at 06:23

## 2022-10-25 RX ADMIN — RISPERIDONE 3 MG: 2 TABLET ORAL at 00:39

## 2022-10-25 RX ADMIN — FLUOXETINE 20 MG: 20 CAPSULE ORAL at 21:41

## 2022-10-25 RX ADMIN — DOXYCYCLINE 100 MG: 100 INJECTION, POWDER, LYOPHILIZED, FOR SOLUTION INTRAVENOUS at 17:09

## 2022-10-25 RX ADMIN — LITHIUM CARBONATE 300 MG: 300 TABLET ORAL at 21:41

## 2022-10-25 RX ADMIN — SODIUM CHLORIDE, PRESERVATIVE FREE 20 ML: 5 INJECTION INTRAVENOUS at 13:08

## 2022-10-25 RX ADMIN — FLUOXETINE 40 MG: 20 CAPSULE ORAL at 09:32

## 2022-10-25 RX ADMIN — FLUOXETINE 20 MG: 20 CAPSULE ORAL at 00:39

## 2022-10-25 RX ADMIN — DOCUSATE SODIUM 100 MG: 100 CAPSULE, LIQUID FILLED ORAL at 21:41

## 2022-10-25 RX ADMIN — RISPERIDONE 3 MG: 2 TABLET ORAL at 21:40

## 2022-10-25 RX ADMIN — ENOXAPARIN SODIUM 40 MG: 100 INJECTION SUBCUTANEOUS at 21:40

## 2022-10-25 RX ADMIN — LITHIUM CARBONATE 300 MG: 300 TABLET ORAL at 00:38

## 2022-10-25 RX ADMIN — POTASSIUM CHLORIDE 10 MEQ: 750 TABLET, FILM COATED, EXTENDED RELEASE ORAL at 09:32

## 2022-10-25 RX ADMIN — TEMAZEPAM 30 MG: 15 CAPSULE ORAL at 21:41

## 2022-10-25 RX ADMIN — SODIUM CHLORIDE 100 ML/HR: 9 INJECTION, SOLUTION INTRAVENOUS at 07:35

## 2022-10-25 NOTE — PROGRESS NOTES
Comprehensive Nutrition Assessment    Type and Reason for Visit: Initial, Positive nutrition screen (MST 2)    Nutrition Recommendations/Plan:   Continue current PO diet  Monitor intakes, wt for continued losses     Malnutrition Assessment:  Malnutrition Status:  No malnutrition (10/25/22 1817)    Context:  Social/environmental circumstances     Findings of the 6 clinical characteristics of malnutrition:   Energy Intake:  No significant decrease in energy intake  Weight Loss:  Mild weight loass (specify amount and time period) (Wt loss of 1.5kg x1 month (2.4%), 4.8kg x~1Year (7%))     Body Fat Loss:  Unable to assess,     Muscle Mass Loss:  Unable to assess,    Fluid Accumulation:  No significant fluid accumulation,        Nutrition Assessment:    Admitted for seizures, initially in ICU however transferred to medical unit today. Unable to interview pt 2/2 ID, per RN pt with excellent appetite and intakes of 100% of meals. Noted MST finding wt loss of 14-23# recently, this is not found after EMR wt hx review. No nutrition interventions at this time. Labs: Hct 36, K+ 3.4, AST 14, Alb 2.8. Meds: Colace, Vibramycin, fluoxetine, lithium, ativan, zosyn, Kcl, risperidone, temazepam.      Nutrition Related Findings:    NFPE deferred, pt appears well nourished. No hx dysphagia. No N/V/D/C, last BM pta. No edema. Current Nutrition Intake & Therapies:  Average Meal Intake: %  Average Supplement Intake: None ordered  ADULT DIET Regular    Anthropometric Measures:  Height: 5' 7.99\" (172.7 cm)  Ideal Body Weight (IBW): 154 lbs (70 kg)     Current Body Wt:  62 kg (136 lb 11 oz) (10/25), 88.8 % IBW. Bed scale  Current BMI (kg/m2): 20.8  Usual Body Weight:  (rose)                       BMI Category: Normal weight (BMI 18.5-24. 9)  Wt Readings from Last 10 Encounters:   10/24/22 62 kg (136 lb 11 oz)   10/24/22 63.5 kg (140 lb)   10/09/22 63.5 kg (140 lb)   08/17/22 64.4 kg (142 lb)   06/01/22 63.5 kg (140 lb)   05/17/22 66.2 kg (146 lb)   02/10/22 65.8 kg (145 lb)   01/11/22 65.8 kg (145 lb)   11/18/21 66.8 kg (147 lb 3.2 oz)   11/16/21 64.4 kg (142 lb)   Wt loss of 1.5kg x1 month (2.4%), 4.8kg x~1 Year (7%), not significant.        Nutrition Diagnosis:   Unintended weight loss related to  (unknown etiology) as evidenced by weight loss (Wt loss of 1.5kg x1 month (2.4%), 4.8kg x~1 Year (7%))    Nutrition Interventions:   Food and/or Nutrient Delivery: Continue current diet  Nutrition Education/Counseling: No recommendations at this time  Coordination of Nutrition Care: Continue to monitor while inpatient  Plan of Care discussed with: RN    Goals:     Goals: PO intake 75% or greater, within 7 days       Nutrition Monitoring and Evaluation:   Behavioral-Environmental Outcomes: None identified  Food/Nutrient Intake Outcomes: Diet advancement/tolerance, Food and nutrient intake  Physical Signs/Symptoms Outcomes: Weight    Discharge Planning:    No discharge needs at this time, Continue current diet    Ingrid Stephen  Contact: Ext 2020, or via [employer]

## 2022-10-25 NOTE — PROGRESS NOTES
Problem: Falls - Risk of  Goal: *Absence of Falls  Description: Document Melissa Jacob Fall Risk and appropriate interventions in the flowsheet.   Outcome: Progressing Towards Goal  Note: Fall Risk Interventions:            Medication Interventions: Bed/chair exit alarm

## 2022-10-25 NOTE — PROGRESS NOTES
Hospitalist Progress Note               Daily Progress Note: 10/25/2022      Subjective: The patient is seen for follow up.   49-year-old male with history of schizophrenia and depression, on multiple psychotropic medications as well as intellectual disability was brought to the ED late last night due to some seizure-like activity at home. In the ED he had another seizure lasting a minute. Patient recently had his Wellbutrin increased up to twice a day. He is on [place] and valbenazine as well. Chest x-ray showed left lower lobe airspace disease. Patient was started on IV doxycycline and ceftriaxone    --------  Patient is seen for follow-up. He is awake and alert. Currently not speaking with me but was speaking with the nurse earlier.     Problem List:  Problem List as of 10/25/2022 Date Reviewed: 10/24/2022            Codes Class Noted - Resolved    Seizure ([facility] ICD-10-CM: R56.9  ICD-9-CM: 780.39  10/24/2022 - Present        Status epilepticus ([facility] ICD-10-CM: G40.901  ICD-9-CM: 345.3  10/24/2022 - Present        Vitamin D deficiency ICD-10-CM: E55.9  ICD-9-CM: 268.9  5/12/2022 - Present        Depression with suicidal ideation ICD-10-CM: F32.A, R45.851  ICD-9-CM: 917, V62.84  9/5/2021 - Present        Gait disturbance ICD-10-CM: R26.9  ICD-9-CM: 781.2  6/10/2021 - Present        Schizophrenia ([facility] ICD-10-CM: F20.9  ICD-9-CM: 295.90  7/31/2020 - Present        Tremor ICD-10-CM: R25.1  ICD-9-CM: 781.0  7/31/2020 - Present        Type 2 diabetes mellitus without complication ([facility] QIM-90-TW: E11.9  ICD-9-CM: 250.00  7/31/2020 - Present        Memory impairment ICD-10-CM: R41.3  ICD-9-CM: 780.93  7/31/2020 - Present           Medications reviewed  Current Facility-Administered Medications   Medication Dose Route Frequency    cefTRIAXone (ROCEPHIN) 2 g in sterile water (preservative free) 20 mL IV syringe  2 g IntraVENous Q24H    doxycycline (VIBRAMYCIN) 100 mg in 0.9% sodium chloride (MBP/ADV) 100 mL MBP  100 mg IntraVENous Q12H    temazepam (RESTORIL) capsule 30 mg  30 mg Oral QHS    haloperidoL (HALDOL) tablet 2 mg  2 mg Oral DAILY    potassium chloride SR (KLOR-CON 10) tablet 10 mEq  10 mEq Oral DAILY    . PHARMACY TO SUBSTITUTE PER PROTOCOL (Reordered from: valbenazine (Ingrezza) 60 mg cap)    Per Protocol    0.9% sodium chloride infusion  100 mL/hr IntraVENous CONTINUOUS    sodium chloride (NS) flush 5-40 mL  5-40 mL IntraVENous Q8H    sodium chloride (NS) flush 5-40 mL  5-40 mL IntraVENous PRN    LORazepam (ATIVAN) injection 1 mg  1 mg IntraVENous PRN    docusate sodium (COLACE) capsule 100 mg  100 mg Oral BID    enoxaparin (LOVENOX) injection 40 mg  40 mg SubCUTAneous Q24H    FLUoxetine (PROzac) capsule 40 mg  40 mg Oral DAILY    FLUoxetine (PROzac) capsule 20 mg  20 mg Oral QHS    risperiDONE (RisperDAL) tablet 3 mg  3 mg Oral BID    lithium carbonate tablet 300 mg  300 mg Oral QHS    [START ON 10/26/2022] lithium carbonate SR (LITHOBID) tablet 600 mg  600 mg Oral DAILY       Review of Systems:   Review of systems not obtained due to patient factors. Objective:   Physical Exam:     Visit Vitals  BP (!) 91/55 (BP 1 Location: Right upper arm, BP Patient Position: At rest)   Pulse (!) 54   Temp 98.2 °F (36.8 °C)   Resp 13   Ht 5' 8\" (1.727 m)   Wt 62 kg (136 lb 11 oz)   SpO2 99%   BMI 20.78 kg/m²      O2 Device: None    Temp (24hrs), Av °F (36.7 °C), Min:97.4 °F (36.3 °C), Max:98.7 °F (37.1 °C)    No intake/output data recorded. 10/23 1901 - 10/25 0700  In: 1061.7 [I.V.:1061.7]  Out: 1350 [Urine:1350]    General:   Awake and alert   Lungs:   Clear to auscultation bilaterally. Chest wall:  No tenderness or deformity. Heart:  Regular rate and rhythm, S1, S2 normal, no murmur, click, rub or gallop. Abdomen:   Soft, non-tender. Bowel sounds normal. No masses,  No organomegaly. Extremities: Extremities normal, atraumatic, no cyanosis or edema. Pulses: 2+ and symmetric all extremities. Skin: Skin color, texture, turgor normal. No rashes or lesions   Neurologic: CNII-XII intact. No gross focal deficits         Data Review:       Recent Days:  Recent Labs     10/25/22  0440 10/24/22  1558 10/24/22  0219   WBC 8.1 10.4 9.3   HGB 12.2 14.5 13.5   HCT 36.0* 44.4 39.9    251 209     Recent Labs     10/25/22  0440 10/24/22  1624 10/24/22  1558 10/24/22  0219     --  142 137   K 3.4*  --  3.9 4.2   *  --  110* 108   CO2 25  --  20* 24   GLU 95  --  116* 119*   BUN 10  --  15 16   CREA 0.94  --  1.20 0.83   CA 8.2*  --  9.7 9.0   MG 2.0  --  2.1 2.2   ALB 2.8*  --  3.8 3.3*   TBILI 0.5  --  0.4 0.2   ALT 22  --  28 26   INR  --  0.9  --   --      No results for input(s): PH, PCO2, PO2, HCO3, FIO2 in the last 72 hours. 24 Hour Results:  Recent Results (from the past 24 hour(s))   METABOLIC PANEL, COMPREHENSIVE    Collection Time: 10/24/22  3:58 PM   Result Value Ref Range    Sodium 142 136 - 145 mmol/L    Potassium 3.9 3.5 - 5.1 mmol/L    Chloride 110 (H) 97 - 108 mmol/L    CO2 20 (L) 21 - 32 mmol/L    Anion gap 12 5 - 15 mmol/L    Glucose 116 (H) 65 - 100 mg/dL    BUN 15 6 - 20 mg/dL    Creatinine 1.20 0.70 - 1.30 mg/dL    BUN/Creatinine ratio 13 12 - 20      eGFR >60 >60 ml/min/1.73m2    Calcium 9.7 8.5 - 10.1 mg/dL    Bilirubin, total 0.4 0.2 - 1.0 mg/dL    AST (SGOT) 15 15 - 37 U/L    ALT (SGPT) 28 12 - 78 U/L    Alk.  phosphatase 69 45 - 117 U/L    Protein, total 7.0 6.4 - 8.2 g/dL    Albumin 3.8 3.5 - 5.0 g/dL    Globulin 3.2 2.0 - 4.0 g/dL    A-G Ratio 1.2 1.1 - 2.2     CBC WITH AUTOMATED DIFF    Collection Time: 10/24/22  3:58 PM   Result Value Ref Range    WBC 10.4 4.1 - 11.1 K/uL    RBC 4.87 4.10 - 5.70 M/uL    HGB 14.5 12.1 - 17.0 g/dL    HCT 44.4 36.6 - 50.3 %    MCV 91.2 80.0 - 99.0 FL    MCH 29.8 26.0 - 34.0 PG    MCHC 32.7 30.0 - 36.5 g/dL    RDW 13.0 11.5 - 14.5 %    PLATELET 911 138 - 796 K/uL    MPV 10.1 8.9 - 12.9 FL    NRBC 0.0 0.0  WBC    ABSOLUTE NRBC 0. 00 0.00 - 0.01 K/uL    NEUTROPHILS 66 32 - 75 %    LYMPHOCYTES 22 12 - 49 %    MONOCYTES 9 5 - 13 %    EOSINOPHILS 2 0 - 7 %    BASOPHILS 0 0 - 1 %    IMMATURE GRANULOCYTES 1 (H) 0 - 0.5 %    ABS. NEUTROPHILS 6.9 1.8 - 8.0 K/UL    ABS. LYMPHOCYTES 2.3 0.8 - 3.5 K/UL    ABS. MONOCYTES 1.0 0.0 - 1.0 K/UL    ABS. EOSINOPHILS 0.2 0.0 - 0.4 K/UL    ABS. BASOPHILS 0.0 0.0 - 0.1 K/UL    ABS. IMM. GRANS. 0.1 (H) 0.00 - 0.04 K/UL    DF AUTOMATED     MAGNESIUM    Collection Time: 10/24/22  3:58 PM   Result Value Ref Range    Magnesium 2.1 1.6 - 2.4 mg/dL   ETHYL ALCOHOL    Collection Time: 10/24/22  3:58 PM   Result Value Ref Range    ALCOHOL(ETHYL),SERUM <10 <10 mg/dL   EKG, 12 LEAD, INITIAL    Collection Time: 10/24/22  4:23 PM   Result Value Ref Range    Ventricular Rate 84 BPM    Atrial Rate 84 BPM    P-R Interval 208 ms    QRS Duration 100 ms    Q-T Interval 410 ms    QTC Calculation (Bezet) 484 ms    Calculated P Axis 30 degrees    Calculated R Axis -10 degrees    Calculated T Axis 17 degrees    Diagnosis       Normal sinus rhythm  Prolonged QT  Abnormal ECG  When compared with ECG of 20-OCT-2022 17:33, (Unconfirmed)  Sinus rhythm has replaced Atrial fibrillation  Vent.  rate has decreased BY  68 BPM  ST no longer depressed in Anterolateral leads  T wave inversion no longer evident in Lateral leads  Confirmed by Manny Berry ([id number]) on 10/24/2022 9:44:23 PM     TROPONIN-HIGH SENSITIVITY    Collection Time: 10/24/22  4:24 PM   Result Value Ref Range    Troponin-High Sensitivity 7 0 - 76 ng/L   LITHIUM    Collection Time: 10/24/22  4:24 PM   Result Value Ref Range    Lithium level 1.00 0.60 - 1.20 mmol/L   AMMONIA    Collection Time: 10/24/22  4:24 PM   Result Value Ref Range    Ammonia, plasma 23 <32 umol/L   PROTHROMBIN TIME + INR    Collection Time: 10/24/22  4:24 PM   Result Value Ref Range    Prothrombin time 12.7 11.9 - 14.6 sec    INR 0.9 0.9 - 1.1     PTT    Collection Time: 10/24/22  4:24 PM   Result Value Ref Range    aPTT 24.8 21.2 - 34.1 sec    aPTT, therapeutic range   82 - 109 sec   CK    Collection Time: 10/24/22  4:24 PM   Result Value Ref Range    CK 99 39 - 308 U/L   TSH 3RD GENERATION    Collection Time: 10/24/22  4:24 PM   Result Value Ref Range    TSH 2.35 0.36 - 3.74 uIU/mL   URINALYSIS W/ REFLEX CULTURE    Collection Time: 10/24/22  7:08 PM    Specimen: Urine   Result Value Ref Range    Color Yellow/Straw      Appearance Clear Clear      Specific gravity 1.008 1.003 - 1.030      pH (UA) 7.0 5.0 - 8.0      Protein Negative Negative mg/dL    Glucose Negative Negative mg/dL    Ketone Negative Negative mg/dL    Bilirubin Negative Negative      Blood Small (A) Negative      Urobilinogen 0.1 0.1 - 1.0 EU/dL    Nitrites Negative Negative      Leukocyte Esterase Negative Negative      UA:UC IF INDICATED Culture not indicated by UA result Culture not indicated by UA result      WBC 0-4 0 - 4 /hpf    RBC 0-5 0 - 5 /hpf    Bacteria Negative Negative /hpf   DRUG SCREEN, URINE    Collection Time: 10/24/22  7:08 PM   Result Value Ref Range    AMPHETAMINES Negative Negative      BARBITURATES Negative Negative      BENZODIAZEPINES Negative Negative      COCAINE Negative Negative      METHADONE Negative Negative      OPIATES Negative Negative      PCP(PHENCYCLIDINE) Negative Negative      THC (TH-CANNABINOL) Negative Negative      Drug screen comment        This test is a screen for drugs of abuse in a medical setting only (i.e., they are unconfirmed results and as such must not be used for non-medical purposes, e.g.,employment testing, legal testing). Due to its inherent nature, false positive (FP) and false negative (FN) results may be obtained. Therefore, if necessary for medical care, recommend confirmation of positive findings by GC/MS.      COVID-19 RAPID TEST    Collection Time: 10/24/22  7:59 PM   Result Value Ref Range    COVID-19 rapid test Not Detected Not Detected     MRSA SCREEN - PCR (NASAL)    Collection Time: 10/24/22 10:45 PM   Result Value Ref Range    MRSA by PCR, Nasal Not Detected Not Detected     METABOLIC PANEL, COMPREHENSIVE    Collection Time: 10/25/22  4:40 AM   Result Value Ref Range    Sodium 142 136 - 145 mmol/L    Potassium 3.4 (L) 3.5 - 5.1 mmol/L    Chloride 113 (H) 97 - 108 mmol/L    CO2 25 21 - 32 mmol/L    Anion gap 4 (L) 5 - 15 mmol/L    Glucose 95 65 - 100 mg/dL    BUN 10 6 - 20 mg/dL    Creatinine 0.94 0.70 - 1.30 mg/dL    BUN/Creatinine ratio 11 (L) 12 - 20      eGFR >60 >60 ml/min/1.73m2    Calcium 8.2 (L) 8.5 - 10.1 mg/dL    Bilirubin, total 0.5 0.2 - 1.0 mg/dL    AST (SGOT) 14 (L) 15 - 37 U/L    ALT (SGPT) 22 12 - 78 U/L    Alk. phosphatase 55 45 - 117 U/L    Protein, total 5.7 (L) 6.4 - 8.2 g/dL    Albumin 2.8 (L) 3.5 - 5.0 g/dL    Globulin 2.9 2.0 - 4.0 g/dL    A-G Ratio 1.0 (L) 1.1 - 2.2     MAGNESIUM    Collection Time: 10/25/22  4:40 AM   Result Value Ref Range    Magnesium 2.0 1.6 - 2.4 mg/dL   CBC WITH AUTOMATED DIFF    Collection Time: 10/25/22  4:40 AM   Result Value Ref Range    WBC 8.1 4.1 - 11.1 K/uL    RBC 4.00 (L) 4.10 - 5.70 M/uL    HGB 12.2 12.1 - 17.0 g/dL    HCT 36.0 (L) 36.6 - 50.3 %    MCV 90.0 80.0 - 99.0 FL    MCH 30.5 26.0 - 34.0 PG    MCHC 33.9 30.0 - 36.5 g/dL    RDW 12.8 11.5 - 14.5 %    PLATELET 566 132 - 581 K/uL    MPV 10.1 8.9 - 12.9 FL    NRBC 0.0 0.0  WBC    ABSOLUTE NRBC 0.00 0.00 - 0.01 K/uL    NEUTROPHILS 65 32 - 75 %    LYMPHOCYTES 22 12 - 49 %    MONOCYTES 10 5 - 13 %    EOSINOPHILS 2 0 - 7 %    BASOPHILS 0 0 - 1 %    IMMATURE GRANULOCYTES 1 (H) 0 - 0.5 %    ABS. NEUTROPHILS 5.3 1.8 - 8.0 K/UL    ABS. LYMPHOCYTES 1.8 0.8 - 3.5 K/UL    ABS. MONOCYTES 0.8 0.0 - 1.0 K/UL    ABS. EOSINOPHILS 0.1 0.0 - 0.4 K/UL    ABS. BASOPHILS 0.0 0.0 - 0.1 K/UL    ABS. IMM. GRANS. 0.1 (H) 0.00 - 0.04 K/UL    DF AUTOMATED         XR CHEST PORT   Final Result   Left lower airspace disease. Assessment:  New onset seizures.   Possibly medication related, i.e. Wellbutrin    Left lower lobe pneumonia, suspect aspiration    Schizoaffective disorder   -Patient on a host of psychotropics including risperidone, lithium, Haldol, fluoxetine, Wellbutrin and valbenazine    Intellectual disability      Plan: Will change ceftriaxone to Zosyn  Check procalcitonin    I spoke with neurology. No antiepileptic therapy recommended at this time. She recommends we stop Wellbutrin    Care Plan discussed with: Nurse    Disposition: Transfer to floor    Total time spent with patient: 30 minutes.     Nery Huerta MD

## 2022-10-25 NOTE — PROGRESS NOTES
TRANSFER - OUT REPORT:    Verbal report given to Diana Wu RN on Shirley T Candi  being transferred to #[id number] (unit) for routine progression of care       Report consisted of patients Situation, Background, Assessment and   Recommendations(SBAR). Information from the following report(s) SBAR, Kardex, ED Summary, and Cardiac Rhythm    was reviewed with the receiving nurse. Lines:   Peripheral IV 10/24/22 Left Antecubital (Active)   Site Assessment Clean, dry, & intact 10/25/22 0700   Phlebitis Assessment 0 10/25/22 0700   Infiltration Assessment 0 10/25/22 0700   Dressing Status Clean, dry, & intact 10/25/22 0700   Dressing Type Transparent 10/25/22 0700   Hub Color/Line Status Pink 10/25/22 0700   Action Taken Open ports on tubing capped 10/25/22 0700   Alcohol Cap Used Yes 10/25/22 0700       Peripheral IV 10/24/22 Distal;Right Basilic (Active)   Site Assessment Clean, dry, & intact 10/25/22 1100   Phlebitis Assessment 0 10/25/22 1100   Infiltration Assessment 0 10/25/22 1100   Dressing Status Clean, dry, & intact 10/25/22 1100   Dressing Type Transparent 10/25/22 1100   Hub Color/Line Status Pink 10/25/22 1100   Action Taken Open ports on tubing capped 10/25/22 1100   Alcohol Cap Used Yes 10/25/22 1100        Opportunity for questions and clarification was provided.       Patient transported with:   Registered Nurse

## 2022-10-25 NOTE — PROGRESS NOTES
Problem: Discharge Planning  Goal: *Discharge to safe environment  Outcome: Progressing Towards Goal  Goal: *Knowledge of medication management  Outcome: Progressing Towards Goal  Goal: *Knowledge of discharge instructions  Outcome: Progressing Towards Goal     Problem: Patient Education: Go to Patient Education Activity  Goal: Patient/Family Education  Outcome: Progressing Towards Goal     Problem: Falls - Risk of  Goal: *Absence of Falls  Description: Document Sophie Hao Fall Risk and appropriate interventions in the flowsheet.   Outcome: Progressing Towards Goal  Note: Fall Risk Interventions:            Medication Interventions: Bed/chair exit alarm                   Problem: Patient Education: Go to Patient Education Activity  Goal: Patient/Family Education  Outcome: Progressing Towards Goal

## 2022-10-25 NOTE — CONSULTS
NEUROLOGY CONSULT    Name [de-identified] Candi Age 59 y.o. MRN [id number]  1965     Referring Physician: Austin Avila NP      Chief Complaint:  Seizures     Mr. He Peña is a 59year old [place] male with a history of schizophrenia and on numerous medications who was admitted to ED for seizures. I called his brother Pablo Tsai this morning to obtain a clear history. His brother verbalized Mr. Christopher graduated from [employer] and can speak English but since diagnosed with mental illness, sentence or words need to be repeated twice back to back in order for him to understand. Around 2358 on 10/23/22, patient started having seizures. Brother heard a noise and went to check and found his whole body shaking on the floor. His eyes remained open. He denied any foaming in the mouth, incontinence of bowel and bladder. Unable to say how long that episode lasted since he was not there. Took Mr. Christopher to ED, CT scan and blood tests were done overnight and was discharged home around 6 AM. 30 minutes later, he experienced same episode and lasted 1 minute or less and patient was taken back to ED. On arrival at ED, he had another seizure which lasted 1 minute. He was loaded with Keppra 500 mg and a dose of IV ativan given to prevent additional seizures given the frequency. He never had seizure/ activities before and denied any family history of seizures. There was no major change in medications except he had been taking Wellbutrin for years and 2 months ago, he was switched from Wellbutrin 150 mg once daily to twice a day. At the time of my evaluation, Mr. Christopher was awake and alert. Unable to assess mentation due to language barrier but was able to say his name and he speak [place]. He followed commands without any difficulty. Assessment and Plan    1) Seizures: Patient had 3 seizure/activities in the last 3 days. He seizures could be likely related to Wellbutrin. Recommend discontinuing this medication.  Will do mri brain to rule out a structural lesions that can be epileptogenic. Hold off on any antiepileptics unless he has more breakthrough seizures. 2) Schizophrenia: Already on multiple spych meds. Thanks for the consult,    Anabel Sawant  Nurse Practitioner    Collaborating Physician:  Dr. Yessenia Sanchez    No Known Allergies        Current Facility-Administered Medications   Medication Dose Route Frequency    piperacillin-tazobactam (ZOSYN) 3.375 g in 0.9% sodium chloride (MBP/ADV) 100 mL MBP  3.375 g IntraVENous Q8H    doxycycline (VIBRAMYCIN) 100 mg in 0.9% sodium chloride (MBP/ADV) 100 mL MBP  100 mg IntraVENous Q12H    temazepam (RESTORIL) capsule 30 mg  30 mg Oral QHS    haloperidoL (HALDOL) tablet 2 mg  2 mg Oral DAILY    potassium chloride SR (KLOR-CON 10) tablet 10 mEq  10 mEq Oral DAILY    . PHARMACY TO SUBSTITUTE PER PROTOCOL (Reordered from: valbenazine (Ingrezza) 60 mg cap)    Per Protocol    0.9% sodium chloride infusion  100 mL/hr IntraVENous CONTINUOUS    sodium chloride (NS) flush 5-40 mL  5-40 mL IntraVENous Q8H    sodium chloride (NS) flush 5-40 mL  5-40 mL IntraVENous PRN    LORazepam (ATIVAN) injection 1 mg  1 mg IntraVENous PRN    docusate sodium (COLACE) capsule 100 mg  100 mg Oral BID    enoxaparin (LOVENOX) injection 40 mg  40 mg SubCUTAneous Q24H    FLUoxetine (PROzac) capsule 40 mg  40 mg Oral DAILY    FLUoxetine (PROzac) capsule 20 mg  20 mg Oral QHS    risperiDONE (RisperDAL) tablet 3 mg  3 mg Oral BID    lithium carbonate tablet 300 mg  300 mg Oral QHS    [START ON 10/26/2022] lithium carbonate SR (LITHOBID) tablet 600 mg  600 mg Oral DAILY       Past Medical History:   Diagnosis Date    Anxiety     Depression     Intellectual disability     Schizoaffective disorder, bipolar type (HCC)     Schizophrenia ([facility]         Social History     Tobacco Use    Smoking status: Never    Smokeless tobacco: Never   Vaping Use    Vaping Use: Never used   Substance Use Topics    Alcohol use: Never    Drug use: Never Exam  Visit Vitals  BP (!) 91/55 (BP 1 Location: Right upper arm, BP Patient Position: At rest)   Pulse (!) 54   Temp 98.2 °F (36.8 °C)   Resp 13   Ht 5' 8\" (1.727 m)   Wt 62 kg (136 lb 11 oz)   SpO2 99%   BMI 20.78 kg/m²         General: Well developed, well nourished. Patient in no distress   Head: Normocephalic, atraumatic, anicteric sclera   Neck Normal ROM, No thyromegally   Lungs:     Cardiac:    Abd:    Ext: No pedal edema   Skin: Supple no rash     NeurologicExam:  Mental Status: Awake and alert and oriented to person. Speech: Fluent no aphasia or dysarthria. Cranial Nerves:   Pupils are equal round and reactive to light and accommodation, extraocular movements are intact and full, visual fields are intact by confrontation, no nystagmus noted, face is symmetric, sensation in face is intact and symmetric, hearing is intact and symmetric, tongue and uvula are in midline with normal movements, palate is elevating equally, shoulder shrug is intact and symmetric. Motor:  Full and symmetric strength of upper and lower ext . Normal bulk and tone. Reflexes:   Deep tendon reflexes 2+/4 and symmetric. Sensory:   Unable to assess due to language barrier. Gait:  Deferred. Tremor:   Minimal tremors noted in the hands. Cerebellar:  Coordination intact for finger-nose-finger intact.            Lab Review  Lab Results   Component Value Date/Time    WBC 8.1 10/25/2022 04:40 AM    HCT 36.0 (L) 10/25/2022 04:40 AM    HGB 12.2 10/25/2022 04:40 AM    PLATELET 679 58/85/6858 04:40 AM     Lab Results   Component Value Date/Time    Sodium 142 10/25/2022 04:40 AM    Potassium 3.4 (L) 10/25/2022 04:40 AM    Chloride 113 (H) 10/25/2022 04:40 AM    CO2 25 10/25/2022 04:40 AM    Glucose 95 10/25/2022 04:40 AM    BUN 10 10/25/2022 04:40 AM    Creatinine 0.94 10/25/2022 04:40 AM    Calcium 8.2 (L) 10/25/2022 04:40 AM     Lab Results   Component Value Date/Time    Vitamin B12 314 10/05/2020 09:46 AM     Lab Results Component Value Date/Time    LDL, calculated 115 (H) 02/10/2022 01:50 PM     Lab Results   Component Value Date/Time    Hemoglobin A1c 5.2 02/10/2022 01:50 PM    Hemoglobin A1c, External 5.1 01/30/2020 12:00 AM     No components found for: TROPQUANT  No results found for: RANDY

## 2022-10-25 NOTE — PROGRESS NOTES
Zosyn Extended-Infusion Dosing/Monitoring  Current regimen:  new start    Recent Labs     10/25/22  0440 10/24/22  1558 10/24/22  0219   CREA 0.94 1.20 0.83   BUN 10 15 16     Estimated CrCl:  74 mL/min    Plan: Change to 4.5 g IV x 1 over 30 minutes followed by 3.375 g IV over 240 minutes every 8 hours per [facility] P&T Committee Protocol with respect to extended-infusion ?-lactam antibiotics. Pharmacy will continue to monitor patient daily and will make dosage adjustments based upon changing renal function.

## 2022-10-25 NOTE — PROGRESS NOTES
Patient admitted to [facility], received handoff report from Priyanka RN. Dual nurse skin assessment performed with Stacey Caldwell RN, no open wounds or pressure injuries noted at this time, skin is clean dry and intact. Patient's brother brought typed list of patient's home medications with amounts and recent changes. Of note, patient is no longer taking haloperidol tablets; he is taking haloperidol decanoate injection biweekly, last received 10/18 and next dose will be due 11/1; additionally, patient is taking Invega extended release 1.5 mg daily. Patient's brother will provide both Claudell Bran from home, as pharmacy reports they only have the 3 mg Invega available and the tablets cannot be split. Notified Dr. Serena Brown, stated ok to discontinue haloperidol tablets and he will address med reconciliation.

## 2022-10-25 NOTE — PROGRESS NOTES
Reason for Admission: Seizures                       RUR Score: 13%                    Plan for utilizing home health:     None @ this time/uses no DME. PCP: First and Last name:  Felipa Navarrete NP     Name of Practice:    Are you a current patient: Yes/No: Yes   Approximate date of last visit: Seen a month ago. Can you participate in a virtual visit with your PCP: No                    Current Advanced Directive/Advance Care Plan: Full Code      Healthcare Decision Maker:              Primary Decision Maker: Brooklyn Degrooter - [phone]                  Transition of Care Plan:  ARJUN spoke with brother Denese Boeck). D/C Plan is home with brother & brother to transport. Send Rxs to [facility] on [address] [facility] [address], in [facility] upon discharge.

## 2022-10-25 NOTE — PROGRESS NOTES
Bedside shift change report given to Priyanka RN  (oncoming nurse) by Kirk Elizabeth RN  (offgoing nurse). Report included the following information SBAR.

## 2022-10-26 VITALS
WEIGHT: 136.69 LBS | DIASTOLIC BLOOD PRESSURE: 66 MMHG | HEART RATE: 66 BPM | HEIGHT: 68 IN | BODY MASS INDEX: 20.72 KG/M2 | SYSTOLIC BLOOD PRESSURE: 101 MMHG | OXYGEN SATURATION: 97 % | RESPIRATION RATE: 14 BRPM | TEMPERATURE: 98.7 F

## 2022-10-26 PROCEDURE — 74011250636 HC RX REV CODE- 250/636: Performed by: INTERNAL MEDICINE

## 2022-10-26 PROCEDURE — 74011250637 HC RX REV CODE- 250/637: Performed by: HOSPITALIST

## 2022-10-26 PROCEDURE — 74011000258 HC RX REV CODE- 258: Performed by: INTERNAL MEDICINE

## 2022-10-26 RX ORDER — AMOXICILLIN AND CLAVULANATE POTASSIUM 875; 125 MG/1; MG/1
1 TABLET, FILM COATED ORAL EVERY 12 HOURS
Status: DISCONTINUED | OUTPATIENT
Start: 2022-10-26 | End: 2022-10-26 | Stop reason: HOSPADM

## 2022-10-26 RX ORDER — AMOXICILLIN AND CLAVULANATE POTASSIUM 875; 125 MG/1; MG/1
1 TABLET, FILM COATED ORAL EVERY 12 HOURS
Qty: 14 TABLET | Refills: 0 | Status: SHIPPED | OUTPATIENT
Start: 2022-10-26 | End: 2022-11-02

## 2022-10-26 RX ADMIN — PIPERACILLIN AND TAZOBACTAM 3.38 G: 3; .375 INJECTION, POWDER, FOR SOLUTION INTRAVENOUS at 09:45

## 2022-10-26 RX ADMIN — LITHIUM CARBONATE 600 MG: 300 TABLET, EXTENDED RELEASE ORAL at 09:44

## 2022-10-26 RX ADMIN — RISPERIDONE 3 MG: 2 TABLET ORAL at 09:45

## 2022-10-26 RX ADMIN — DOCUSATE SODIUM 100 MG: 100 CAPSULE, LIQUID FILLED ORAL at 09:45

## 2022-10-26 RX ADMIN — POTASSIUM CHLORIDE 10 MEQ: 750 TABLET, FILM COATED, EXTENDED RELEASE ORAL at 09:45

## 2022-10-26 RX ADMIN — PIPERACILLIN AND TAZOBACTAM 3.38 G: 3; .375 INJECTION, POWDER, FOR SOLUTION INTRAVENOUS at 00:19

## 2022-10-26 RX ADMIN — FLUOXETINE 40 MG: 20 CAPSULE ORAL at 09:44

## 2022-10-26 NOTE — DISCHARGE SUMMARY
Hospitalist Discharge Summary     Patient ID:    Gaurav Mccormack  [id number]  59 y.o.  1965    Admit date: 10/24/2022    Discharge date : 10/26/2022    Chronic Diagnoses:    Problem List as of 10/26/2022 Date Reviewed: 10/24/2022            Codes Class Noted - Resolved    Seizure ([facility] ICD-10-CM: R56.9  ICD-9-CM: 780.39  10/24/2022 - Present        Status epilepticus ([facility] ICD-10-CM: G40.901  ICD-9-CM: 345.3  10/24/2022 - Present        Vitamin D deficiency ICD-10-CM: E55.9  ICD-9-CM: 268.9  5/12/2022 - Present        Depression with suicidal ideation ICD-10-CM: F32.A, R45.851  ICD-9-CM: 989, V62.84  9/5/2021 - Present        Gait disturbance ICD-10-CM: R26.9  ICD-9-CM: 781.2  6/10/2021 - Present        Schizophrenia ([facility] ICD-10-CM: F20.9  ICD-9-CM: 295.90  7/31/2020 - Present        Tremor ICD-10-CM: R25.1  ICD-9-CM: 781.0  7/31/2020 - Present        Type 2 diabetes mellitus without complication ([place] [address].) [id number]-[id number]: E11.9  ICD-9-CM: 250.00  7/31/2020 - Present        Memory impairment ICD-10-CM: R41.3  ICD-9-CM: 780.93  7/31/2020 - Present       22    Final Diagnoses: Active Problems:    Seizure ([facility] (10/24/2022)      Status epilepticus ([facility] (10/24/2022)        Reason for Hospitalization:  Seizures     Hospital Course:   59 y.o. male with a history of schizophrenia, depression and multiple psychiatric medications is brought to the emergency room early morning around 1:00 as brother found that he has some seizure-like activity when he was sitting. It lasted only 2 minutes but brought him for evaluation, as he was doing fairly okay and work-up was negative, was discharged home this morning. But again around 14:45 just before presentation to the emergency room had another seizure that lasted a minute.   So brought him again to the emergency room, while in the waiting room he had a seizure that was witnessed by the .  Immediately he was brought inside, and ED physician felt he has some postictal status. Did not have any more episodes in the emergency room but due to new onset seizures that were recurrent admitted for further evaluation. He is on Wellbutrin first long time, he was taking only once a day, recently was increased to twice a day. Also Cogentin was changed to [employer]. Haldol was also changed to [facility] -this changes was done almost 2 months ago. CT head  did not show acute intracranial process, however, evidence of ventriculomegaly, compatible with central atrophy. Neurology recommending discontinuing the wellbutrin  as could be related to his recent seizures. He will continue with his current medications prescribed by his PCP and psychiatrist, and in addition will take oral antibiotics for left lower lobe airspace disease , suggestive of pneumonia. He will follow up with his PCP in 2 weeks and neurology in one week for further outpatient evaluation. Pt is stable for discharge home today with family. Discharge Medications:   Current Discharge Medication List        START taking these medications    Details   amoxicillin-clavulanate (AUGMENTIN) 875-125 mg per tablet Take 1 Tablet by mouth every twelve (12) hours for 7 days. Indications: pneumonia caused by bacteria  Qty: 14 Tablet, Refills: 0  Start date: 10/26/2022, End date: 11/2/2022           CONTINUE these medications which have NOT CHANGED    Details   valbenazine (Ingrezza) 60 mg cap Take 1 Capsule by mouth nightly. temazepam (RESTORIL) 30 mg capsule Take 30 mg by mouth nightly. Invega 1.5 mg ER tablet Take 1.5 mg by mouth daily. !! FLUoxetine (PROzac) 20 mg capsule Take 40 mg by mouth daily. !! FLUoxetine (PROzac) 20 mg capsule Take 20 mg by mouth nightly. risperiDONE (RisperDAL) 3 mg tablet Take 3 mg by mouth two (2) times a day. lithium carbonate 300 mg capsule Take 300 mg by mouth nightly.       potassium chloride (KAON 10%) 20 mEq/15 mL solution Take 15 mL by mouth daily.  Qty: 480 mL, Refills: 3    Associated Diagnoses: Hypokalemia      lithium carbonate SR (LITHOBID) 300 mg CR tablet Take 600 mg by mouth daily. !! - Potential duplicate medications found. Please discuss with provider. STOP taking these medications       buPROPion SR (WELLBUTRIN SR) 150 mg SR tablet Comments:   Reason for Stopping:         haloperidol decanoate (HALDOL DECANOATE) 100 mg/mL injection Comments:   Reason for Stopping: Follow up Care:    1. Dore Merlin, NP in 2 weeks. Follow-up Information       Follow up With Specialties Details Why Contact Info    Dore Merlin, NP Nurse Practitioner Follow up in 2 week(s)  [place] [address]  [phone]      Servando Yin MD Neurology Follow up in 1 week(s) follow up after hospital admissin for seizures [address] [place] [address]  [phone]                * Follow-up Care/Patient Instructions: Activity: Activity as tolerated  Diet: Regular Diet  Wound Care: None needed      Condition at Discharge:  Stable  __________________________________________________________________    Disposition  Home or Self Care  ____________________________________________________________________    Code Status:  Full Code  ___________________________________________________________________    Discharge Exam:  Patient seen and examined by me on discharge day. Physical Exam  Constitutional:       General: He is not in acute distress. Cardiovascular:      Rate and Rhythm: Normal rate and regular rhythm. Pulses: Normal pulses. Heart sounds: Normal heart sounds. Pulmonary:      Effort: Pulmonary effort is normal.      Breath sounds: Normal breath sounds. Abdominal:      General: Bowel sounds are normal.      Palpations: Abdomen is soft. Musculoskeletal:         General: Normal range of motion. Skin:     General: Skin is warm and dry.    Neurological:      Mental Status: He is oriented to person, place, and time. Psychiatric:         Mood and Affect: Mood normal.         Behavior: Behavior normal.        CONSULTATIONS: Neurology    Significant Diagnostic Studies:   No results found for this or any previous visit (from the past 24 hour(s)). XR CHEST PORT   Final Result   Left lower airspace disease. MRI BRAIN W WO CONT    (Results Pending)       Discharge: time spent 35 minutes in discharge  Education and counseling.      Signed:  Janus Hodgkins, NP  10/26/2022  11:31 AM

## 2022-10-26 NOTE — PROGRESS NOTES
Went over discharge paperwork with patient and brother, all questions answered. E=prescription sent to pharmacy, follow up appointments made. Patient to have MRI outpatient. Patient medications retrieved from pharmacy. IV line removed. Patient escorted downstairs via wheelchair by nursing, left for home via car with brother.

## 2022-10-26 NOTE — CONSULTS
NEUROLOGY CONSULT    Name [de-identified] Candi Age 59 y.o. MRN [id number]  1965     Referring Physician: Amaris Lindsey NP      Chief Complaint:  Seizures     Mr. Bianca Tapia is a 59year old [place] male with a history of schizophrenia and on numerous medications who was admitted to ED for seizures. I called his brother Walker Face this morning to obtain a clear history. His brother verbalized Mr. Christopher graduated from [employer] and can speak English but since diagnosed with mental illness, sentence or words need to be repeated twice back to back in order for him to understand. Around 2358 on 10/23/22, patient started having seizures. Brother heard a noise and went to check and found his whole body shaking on the floor. His eyes remained open. He denied any foaming in the mouth, incontinence of bowel and bladder. Unable to say how long that episode lasted since he was not there. Took Mr. Christopher to ED, CT scan and blood tests were done overnight and was discharged home around 6 AM. 30 minutes later, he experienced same episode and lasted 1 minute or less and patient was taken back to ED. On arrival at ED, he had another seizure which lasted 1 minute. He was loaded with Keppra 500 mg and a dose of IV ativan given to prevent additional seizures given the frequency. He never had seizure/ activities before and denied any family history of seizures. There was no major change in medications except he had been taking Wellbutrin for years and 2 months ago, he was switched from Wellbutrin 150 mg once daily to twice a day. At the time of my evaluation, Mr. Christopher was awake and alert. Unable to assess mentation due to language barrier but was able to say his name and he speak [place]. He followed commands without any difficulty. Subjective: No seizures overnight    Assessment and Plan    1) Seizures: Patient had 3 seizure/activities in the last 3 days. He seizures could be likely related to Wellbutrin. Recommend discontinuing this medication. I am told MRI machine is broken and he cannot have an MRI. We will will see him in the clinic and if he is still having more seizures we will do an MRI outpatient. No antiepileptics for now since if the cost was Wellbutrin antiplatelets are not indicated  2) Schizophrenia: Already on multiple spych meds.     Thanks for the consult,        Collaborating Physician:  Dr. Pramod Hunt    No Known Allergies        Current Facility-Administered Medications   Medication Dose Route Frequency    piperacillin-tazobactam (ZOSYN) 3.375 g in 0.9% sodium chloride (MBP/ADV) 100 mL MBP  3.375 g IntraVENous Q8H    temazepam (RESTORIL) capsule 30 mg  30 mg Oral QHS    potassium chloride SR (KLOR-CON 10) tablet 10 mEq  10 mEq Oral DAILY    valbenazine cap 60 mg (Patient Supplied)  60 mg Oral QHS    sodium chloride (NS) flush 5-40 mL  5-40 mL IntraVENous Q8H    sodium chloride (NS) flush 5-40 mL  5-40 mL IntraVENous PRN    LORazepam (ATIVAN) injection 1 mg  1 mg IntraVENous PRN    docusate sodium (COLACE) capsule 100 mg  100 mg Oral BID    enoxaparin (LOVENOX) injection 40 mg  40 mg SubCUTAneous Q24H    FLUoxetine (PROzac) capsule 40 mg  40 mg Oral DAILY    FLUoxetine (PROzac) capsule 20 mg  20 mg Oral QHS    risperiDONE (RisperDAL) tablet 3 mg  3 mg Oral BID    lithium carbonate tablet 300 mg  300 mg Oral QHS    lithium carbonate SR (LITHOBID) tablet 600 mg  600 mg Oral DAILY       Past Medical History:   Diagnosis Date    Anxiety     Depression     Intellectual disability     Schizoaffective disorder, bipolar type (HCC)     Schizophrenia (HCC)         Social History     Tobacco Use    Smoking status: Never    Smokeless tobacco: Never   Vaping Use    Vaping Use: Never used   Substance Use Topics    Alcohol use: Never    Drug use: Never            Exam  Visit Vitals  /66 (BP 1 Location: Right upper arm, BP Patient Position: Sitting)   Pulse 66   Temp 98.7 °F (37.1 °C)   Resp 14   Ht 5' 7.99\" (1.727 m)   Wt 62 kg (136 lb 11 oz) SpO2 97%   BMI 20.79 kg/m²         General: Well developed, well nourished. Patient in no distress   Head: Normocephalic, atraumatic, anicteric sclera   Neck Normal ROM, No thyromegally   Lungs:     Cardiac:    Abd:    Ext: No pedal edema   Skin: Supple no rash     NeurologicExam:  Mental Status: Awake and alert and oriented to person, place and time normal speech   Speech: Fluent no aphasia or dysarthria. Cranial Nerves:   Pupils are equal round and reactive to light and accommodation, extraocular movements are intact and full, visual fields are intact by confrontation, no nystagmus noted, face is symmetric, sensation in face is intact and symmetric, hearing is intact and symmetric, tongue and uvula are in midline with normal movements, palate is elevating equally, shoulder shrug is intact and symmetric. Motor:  Full and symmetric strength of upper and lower ext . Normal bulk and tone. Reflexes:   Deep tendon reflexes 2+/4 and symmetric. Sensory:   Unable to assess due to language barrier. Gait:  Deferred. Tremor:   Minimal tremors noted in the hands. Cerebellar:  Coordination intact for finger-nose-finger intact.            Lab Review  Lab Results   Component Value Date/Time    WBC 8.1 10/25/2022 04:40 AM    HCT 36.0 (L) 10/25/2022 04:40 AM    HGB 12.2 10/25/2022 04:40 AM    PLATELET 989 42/00/4629 04:40 AM     Lab Results   Component Value Date/Time    Sodium 142 10/25/2022 04:40 AM    Potassium 3.4 (L) 10/25/2022 04:40 AM    Chloride 113 (H) 10/25/2022 04:40 AM    CO2 25 10/25/2022 04:40 AM    Glucose 95 10/25/2022 04:40 AM    BUN 10 10/25/2022 04:40 AM    Creatinine 0.94 10/25/2022 04:40 AM    Calcium 8.2 (L) 10/25/2022 04:40 AM     Lab Results   Component Value Date/Time    Vitamin B12 314 10/05/2020 09:46 AM     Lab Results   Component Value Date/Time    LDL, calculated 115 (H) 02/10/2022 01:50 PM     Lab Results   Component Value Date/Time    Hemoglobin A1c 5.2 02/10/2022 01:50 PM Hemoglobin A1c, External 5.1 01/30/2020 12:00 AM     No components found for: TROPQUANT  No results found for: RANDY          No no and does not like to wear off that quickly so you can put her she is on 80 daily is a normal sinus put her back on the 80 does give him the pills yeah and to sign off on

## 2022-10-26 NOTE — PROGRESS NOTES
CM noted discharge order. Patient is clear to discharge home self care by CM. Nurse is aware. Discharge plan of care/case management plan validated with provider discharge order. Medicare pt has received, reviewed, and signed 2nd IM letter informing them of their right to appeal the discharge. Signed copied has been placed on pt bedside chart.

## 2022-10-26 NOTE — PROGRESS NOTES
Problem: Discharge Planning  Goal: *Discharge to safe environment  Outcome: Progressing Towards Goal  Goal: *Knowledge of medication management  Outcome: Progressing Towards Goal  Goal: *Knowledge of discharge instructions  Outcome: Progressing Towards Goal     Problem: Patient Education: Go to Patient Education Activity  Goal: Patient/Family Education  Outcome: Progressing Towards Goal     Problem: Falls - Risk of  Goal: *Absence of Falls  Description: Document Ashish Fail Fall Risk and appropriate interventions in the flowsheet.   Outcome: Progressing Towards Goal  Note: Fall Risk Interventions:  Mobility Interventions: Bed/chair exit alarm    Mentation Interventions: Adequate sleep, hydration, pain control, Bed/chair exit alarm    Medication Interventions: Assess postural VS orthostatic hypotension, Bed/chair exit alarm

## 2022-10-26 NOTE — PROGRESS NOTES
Spoke to brother Renetta Arnold Candi regarding patient discharge, states he will arrive 1300 to sign discharge paperwork and  patient for home.

## 2022-10-27 ENCOUNTER — PATIENT OUTREACH (OUTPATIENT)
Dept: CASE MANAGEMENT | Age: 57
End: 2022-10-27

## 2022-10-28 NOTE — PROGRESS NOTES
Care Transitions Outreach Attempt    Call within 2 business days of discharge: Yes   Attempted to reach patient for transitions of care follow up. Unable to reach patient. Patient: Blanka Meza Candi Patient : 1965 MRN: [id number]    Last Discharge [address]       Date Complaint Diagnosis Description Type Department Provider    10/24/22 Seizure Seizure ([facility] . .. ED to Hosp-Admission (Discharged) (ADMIT) Sukhi Lewis MD; Nancy Davila . ..    10/24/22 Seizure Seizure [facility]) ED ([facility]) Hadley Kirby MD              Was this an external facility discharge?  No     Noted following upcoming appointments from discharge chart review:   [facility] follow up appointment(s):   Future Appointments   Date Time Provider Ana M Rodriguez   2022  3:00 PM [facility] BS AMB   2022 11:00 AM Arianne Pichardo DO [facility] BS AMB

## 2022-10-30 LAB
BACTERIA SPEC CULT: NORMAL
SPECIAL REQUESTS,SREQ: NORMAL

## 2022-11-01 ENCOUNTER — CLINICAL SUPPORT (OUTPATIENT)
Dept: FAMILY MEDICINE CLINIC | Age: 57
End: 2022-11-01

## 2022-11-01 DIAGNOSIS — F20.9 SCHIZOPHRENIA, UNSPECIFIED TYPE (HCC): Primary | ICD-10-CM

## 2022-11-01 NOTE — PROGRESS NOTES
Pt came in today for a Haloperidol  injection, tolerated injection well he was brought by his brother  - [facility]- [id number]  1mL  - Lot#- [id number]  - Exp. 5//2023  Site- right deltoid  Chief Complaint   Patient presents with    Injection     Halperodol injection  Nurse visit

## 2022-11-03 ENCOUNTER — OFFICE VISIT (OUTPATIENT)
Dept: FAMILY MEDICINE CLINIC | Age: 57
End: 2022-11-03
Payer: MEDICARE

## 2022-11-03 VITALS
BODY MASS INDEX: 26.5 KG/M2 | DIASTOLIC BLOOD PRESSURE: 65 MMHG | TEMPERATURE: 96.3 F | WEIGHT: 135 LBS | SYSTOLIC BLOOD PRESSURE: 108 MMHG | OXYGEN SATURATION: 98 % | HEART RATE: 56 BPM | HEIGHT: 60 IN

## 2022-11-03 DIAGNOSIS — T78.40XA ALLERGY, INITIAL ENCOUNTER: Primary | ICD-10-CM

## 2022-11-03 PROCEDURE — 99214 OFFICE O/P EST MOD 30 MIN: CPT | Performed by: FAMILY MEDICINE

## 2022-11-03 RX ORDER — FLUTICASONE PROPIONATE 50 MCG
2 SPRAY, SUSPENSION (ML) NASAL
Qty: 1 EACH | Refills: 5 | Status: SHIPPED | OUTPATIENT
Start: 2022-11-03

## 2022-11-03 RX ORDER — LORATADINE 10 MG/1
10 TABLET ORAL
Qty: 30 TABLET | Refills: 5 | Status: SHIPPED | OUTPATIENT
Start: 2022-11-03 | End: 2022-11-16

## 2022-11-03 NOTE — PROGRESS NOTES
1. Have you been to the ER, urgent care clinic since your last visit? Hospitalized since your last visit? yes, [facility], stomach flu    2. Have you seen or consulted any other health care providers outside of the [address] [place] [address] since your last visit? Include any pap smears or colon screening.  No    Chief Complaint   Patient presents with    Cold Symptoms     Visit Vitals  /65 (BP 1 Location: Right upper arm, BP Patient Position: Sitting, BP Cuff Size: Large adult)   Pulse (!) 56   Temp (!) 96.3 °F (35.7 °C) (Temporal)   Ht 5' (1.524 m)   Wt 135 lb (61.2 kg)   SpO2 98%   BMI 26.37 kg/m²

## 2022-11-03 NOTE — PROGRESS NOTES
Subjective  Chief Complaint   Patient presents with    Cold Symptoms     HPI:  [de-identified] T Candi is a 59 y.o. male with medical problems as listed below who presents for rhinorrhea. Patient is accompanied by his brother who provides all of the history. Rhinorrhea and watery eyes: Duration, last few days. Had GI issues the whole month of October, but diarrhea is now resolved and as of yesterday, patient's appetite has picked up. Brother is concerned that he might be allergic to [address] or some other kind of food. Brother has given him Zyrtec which didn't seem to help. He has also been giving Tylenol which does seem to help. Brother has noticed that patient is weak but patient just got out of hospital for seizures and was also diagnosed with left lower lobe pneumonia for which he is taking Augmentin. He has 3 tabs left. He has a follow up appointment with Neurology in 2 weeks, and he is supposed to get an MRI Brain at the hospital when their machine is up and running again. Patient Active Problem List   Diagnosis Code    Schizophrenia ([facility] F20.9    Tremor R25.1    Type 2 diabetes mellitus without complication (HCC) T25.9    Memory impairment R41.3    Gait disturbance R26.9    Depression with suicidal ideation F32. A, R45.851    Vitamin D deficiency E55.9    Seizure ([facility] R56.9    Status epilepticus ([facility] G40.901     Family History   Problem Relation Age of Onset    Cancer Father         bone    Pancreatic Cancer Father     Dementia Mother      Social History     Tobacco Use    Smoking status: Never    Smokeless tobacco: Never   Vaping Use    Vaping Use: Never used   Substance Use Topics    Alcohol use: Never    Drug use: Never     Current Outpatient Medications on File Prior to Visit   Medication Sig Dispense Refill    valbenazine (Ingrezza) 60 mg cap Take 1 Capsule by mouth nightly. temazepam (RESTORIL) 30 mg capsule Take 30 mg by mouth nightly. Invega 1.5 mg ER tablet Take 1.5 mg by mouth daily. FLUoxetine (PROzac) 20 mg capsule Take 40 mg by mouth daily. FLUoxetine (PROzac) 20 mg capsule Take 20 mg by mouth nightly. risperiDONE (RisperDAL) 3 mg tablet Take 3 mg by mouth two (2) times a day. lithium carbonate 300 mg capsule Take 300 mg by mouth nightly. potassium chloride (KAON 10%) 20 mEq/15 mL solution Take 15 mL by mouth daily. 480 mL 3    lithium carbonate SR (LITHOBID) 300 mg CR tablet Take 600 mg by mouth daily. [] amoxicillin-clavulanate (AUGMENTIN) 875-125 mg per tablet Take 1 Tablet by mouth every twelve (12) hours for 7 days. Indications: pneumonia caused by bacteria 14 Tablet 0     No current facility-administered medications on file prior to visit. Not on File  Review of Systems   Unable to perform ROS: Medical condition     Objective  Visit Vitals  /65 (BP 1 Location: Right upper arm, BP Patient Position: Sitting, BP Cuff Size: Large adult)   Pulse (!) 56   Temp (!) 96.3 °F (35.7 °C) (Temporal)   Ht 5' (1.524 m)   Wt 135 lb (61.2 kg)   SpO2 98%   BMI 26.37 kg/m²     Physical Exam  Constitutional:       General: He is not in acute distress. Appearance: Normal appearance. He is not ill-appearing. HENT:      Head: Normocephalic and atraumatic. Right Ear: External ear normal. There is impacted cerumen. Left Ear: External ear normal. There is impacted cerumen. Nose: Congestion and rhinorrhea present. Mouth/Throat:      Mouth: Mucous membranes are moist.      Pharynx: Oropharynx is clear. No oropharyngeal exudate or posterior oropharyngeal erythema. Eyes:      Extraocular Movements: Extraocular movements intact. Conjunctiva/sclera: Conjunctivae normal.   Cardiovascular:      Rate and Rhythm: Normal rate and regular rhythm. Heart sounds: Normal heart sounds. No murmur heard. Pulmonary:      Effort: Pulmonary effort is normal. No respiratory distress. Breath sounds: Normal breath sounds.    Musculoskeletal: General: No swelling. Normal range of motion. Cervical back: Normal range of motion and neck supple. Right lower leg: No edema. Left lower leg: No edema. Lymphadenopathy:      Cervical: No cervical adenopathy. Skin:     General: Skin is warm and dry. Neurological:      General: No focal deficit present. Mental Status: He is alert. Assessment & Plan    ICD-10-CM ICD-9-CM    1. Allergy, initial encounter  T78.40XA 995.3         Diagnoses and all orders for this visit:    1. Allergy, initial encounter  Symptoms sound to be most consistent with allergies. Possibly related to pneumonia which patient was diagnosed with during his recent hospitalization. Advised to finish full course of Augmentin. Rx for Claritin and Flonase sent to pharmacy. Counseled on return precautions should patient start to feel worse. Other orders  -     loratadine (Claritin) 10 mg tablet; Take 1 Tablet by mouth daily as needed for Allergies. -     fluticasone propionate (FLONASE) 50 mcg/actuation nasal spray; 2 Sprays by Both Nostrils route daily as needed for Rhinitis or Allergies. Patient has follow up in the next two weeks to establish care. Aspects of this note have been generated using voice recognition software. Despite editing, there may be some syntax errors.     Vini Crane, DO

## 2022-11-15 ENCOUNTER — CLINICAL SUPPORT (OUTPATIENT)
Dept: FAMILY MEDICINE CLINIC | Age: 57
End: 2022-11-15
Payer: MEDICARE

## 2022-11-15 DIAGNOSIS — F20.9 SCHIZOPHRENIA, UNSPECIFIED TYPE (HCC): Primary | ICD-10-CM

## 2022-11-15 PROCEDURE — 99211 OFF/OP EST MAY X REQ PHY/QHP: CPT | Performed by: FAMILY MEDICINE

## 2022-11-15 NOTE — PROGRESS NOTES
Pt came in today for a Haloperidol  injection, tolerated injection well he was brought by his brother  - [facility]- [id number]  1mL  - Lot#- [id number]  - Exp. 5//2023  Site- left deltoid

## 2022-11-16 ENCOUNTER — OFFICE VISIT (OUTPATIENT)
Dept: FAMILY MEDICINE CLINIC | Age: 57
End: 2022-11-16
Payer: MEDICARE

## 2022-11-16 VITALS
SYSTOLIC BLOOD PRESSURE: 129 MMHG | DIASTOLIC BLOOD PRESSURE: 79 MMHG | OXYGEN SATURATION: 97 % | WEIGHT: 141 LBS | RESPIRATION RATE: 16 BRPM | TEMPERATURE: 97.7 F | HEART RATE: 78 BPM | HEIGHT: 60 IN | BODY MASS INDEX: 27.68 KG/M2

## 2022-11-16 DIAGNOSIS — Z11.59 NEED FOR HEPATITIS C SCREENING TEST: ICD-10-CM

## 2022-11-16 DIAGNOSIS — R45.851 DEPRESSION WITH SUICIDAL IDEATION: ICD-10-CM

## 2022-11-16 DIAGNOSIS — E87.6 HYPOKALEMIA: Primary | ICD-10-CM

## 2022-11-16 DIAGNOSIS — Z76.89 ENCOUNTER TO ESTABLISH CARE WITH NEW DOCTOR: ICD-10-CM

## 2022-11-16 DIAGNOSIS — F32.A DEPRESSION WITH SUICIDAL IDEATION: ICD-10-CM

## 2022-11-16 DIAGNOSIS — F20.0 PARANOID SCHIZOPHRENIA (HCC): ICD-10-CM

## 2022-11-16 DIAGNOSIS — R73.03 PREDIABETES: ICD-10-CM

## 2022-11-16 DIAGNOSIS — E27.8 ADRENAL INCIDENTALOMA (HCC): ICD-10-CM

## 2022-11-16 PROBLEM — G40.901 STATUS EPILEPTICUS (HCC): Status: RESOLVED | Noted: 2022-10-24 | Resolved: 2022-11-16

## 2022-11-16 RX ORDER — HALOPERIDOL DECANOATE 100 MG/ML
INJECTION INTRAMUSCULAR EVERY 2 WEEKS
COMMUNITY
End: 2022-11-29 | Stop reason: SDUPTHER

## 2022-11-16 RX ORDER — FLUOXETINE HYDROCHLORIDE 40 MG/1
40 CAPSULE ORAL DAILY
COMMUNITY

## 2022-11-16 NOTE — PROGRESS NOTES
1. Have you been to the ER, urgent care clinic since your last visit? Hospitalized since your last visit? Yes- [facility] 10/24//2022 due to seizure    2. Have you seen or consulted any other health care providers outside of the [address] [place] [address] since your last visit? Include any pap smears or colon screening.  No  Chief Complaint   Patient presents with    Hospital Follow Up     For seizures     Visit Vitals  /79 (BP 1 Location: Right upper arm, BP Patient Position: Sitting, BP Cuff Size: Adult)   Pulse 78   Temp 97.7 °F (36.5 °C) (Temporal)   Resp 16   Ht 5' (1.524 m)   Wt 141 lb (64 kg)   SpO2 97%   BMI 27.54 kg/m²

## 2022-11-16 NOTE — PATIENT INSTRUCTIONS
Vaccines due: COVID booster, influenza vaccine, Shingrix (shingles vaccine)    Plan to repeat imaging of the adrenal nodule in 1 year.

## 2022-11-16 NOTE — PROGRESS NOTES
Subjective  Chief Complaint   Patient presents with    [facility]     HPI:  Katherine Christopher is a 59 y.o. male with medical problems as listed below who presents to [facility]. Past medical history: Schizophrenia, prediabetes, depression, vitamin D deficiency, history of seizure, adrenal incidentaloma  Medications: Invega, temazepam, Ingrezza, Haldol, lithium 600mg qAM and 300mg qHS, Prozac 40mg qAM and 20mg qHS, potassium 20 mEq, Flonase  Allergies: NKDA  Specialists: Dr. Dank Blount in [place] (Psychiatry)  Hospitalization: Recent hospitalization in October for first time seizure thought to be secondary to changes in psychiatric medications as well as Wellbutrin which has since been discontinued. Patient was also diagnosed with pneumonia and treated. He is feeling much better since his hospital follow up visit. Surgical history: None  Family history: Dementia (mom). Pancreatic cancer (dad). Social history: No tobacco, no alcohol, no illicit drug use. Colon cancer screening: Last colonoscopy 10 years ago, normal. Brother and patient elect to discontinue colon cancer screening. Immunizations: Brother plans to go to pharmacy for influenza vaccine and COVID booster. He will ask about shingles vaccine as well. Schizophrenia: All psychiatric medications are prescribed by Dr. Dank Blount. [facility]. Patient comes into this office every 2 weeks to have Haldol injections per Dr. Dank Blount. Prediabetes: Diet controlled. Last A1c 5.2 in 02/2022. Patient Active Problem List   Diagnosis Code    Schizophrenia ([facility] [address] F20.9    Tremor R25.1    Prediabetes R73.03    Memory impairment R41.3    Gait disturbance R26.9    Depression with suicidal ideation F32. A, R45.851    Vitamin D deficiency E55.9    Seizure ([facility] [address] R56.9    Adrenal incidentaloma ([facility] [address] E27.8     Family History   Problem Relation Age of Onset    Dementia Mother     Pancreatic Cancer Father      Social History     Tobacco Use    Smoking status: Never Smokeless tobacco: Never   Vaping Use    Vaping Use: Never used   Substance Use Topics    Alcohol use: Never    Drug use: Never     Current Outpatient Medications on File Prior to Visit   Medication Sig Dispense Refill    haloperidol decanoate (HALDOL DECANOATE) 100 mg/mL injection by IntraMUSCular route Once every 2 weeks. FLUoxetine (PROzac) 40 mg capsule Take 40 mg by mouth daily. fluticasone propionate (FLONASE) 50 mcg/actuation nasal spray 2 Sprays by Both Nostrils route daily as needed for Rhinitis or Allergies. 1 Each 5    valbenazine (Ingrezza) 60 mg cap Take 1 Capsule by mouth nightly. temazepam (RESTORIL) 30 mg capsule Take 30 mg by mouth nightly. Invega 1.5 mg ER tablet Take 1.5 mg by mouth daily. FLUoxetine (PROzac) 20 mg capsule Take 20 mg by mouth daily. 3 times per day 60 mg      risperiDONE (RisperDAL) 3 mg tablet Take 3 mg by mouth two (2) times a day. lithium carbonate 300 mg capsule Take 300 mg by mouth nightly. potassium chloride (KAON 10%) 20 mEq/15 mL solution Take 15 mL by mouth daily. 480 mL 3    lithium carbonate SR (LITHOBID) 300 mg CR tablet Take 600 mg by mouth daily. [DISCONTINUED] loratadine (Claritin) 10 mg tablet Take 1 Tablet by mouth daily as needed for Allergies. (Patient not taking: Reported on 11/16/2022) 30 Tablet 5    [DISCONTINUED] FLUoxetine (PROzac) 20 mg capsule Take 20 mg by mouth nightly. (Patient not taking: Reported on 11/16/2022)       No current facility-administered medications on file prior to visit. No Known Allergies  Review of Systems   Constitutional:  Negative for chills and fever. Respiratory:  Negative for cough, shortness of breath and wheezing. Cardiovascular:  Negative for chest pain, palpitations and leg swelling. Gastrointestinal:  Negative for abdominal pain, constipation, diarrhea, nausea and vomiting. Neurological:  Negative for weakness and headaches.    Psychiatric/Behavioral:  Negative for depression. The patient is not nervous/anxious. Objective  Visit Vitals  /79 (BP 1 Location: Right upper arm, BP Patient Position: Sitting, BP Cuff Size: Adult)   Pulse 78   Temp 97.7 °F (36.5 °C) (Temporal)   Resp 16   Ht 5' (1.524 m)   Wt 141 lb (64 kg)   SpO2 97%   BMI 27.54 kg/m²     Physical Exam  Constitutional:       General: He is not in acute distress. Appearance: Normal appearance. He is not ill-appearing. HENT:      Head: Normocephalic and atraumatic. Eyes:      Extraocular Movements: Extraocular movements intact. Conjunctiva/sclera: Conjunctivae normal.   Cardiovascular:      Rate and Rhythm: Normal rate and regular rhythm. Heart sounds: Normal heart sounds. No murmur heard. Pulmonary:      Effort: Pulmonary effort is normal. No respiratory distress. Breath sounds: Normal breath sounds. Musculoskeletal:         General: No swelling. Normal range of motion. Cervical back: Normal range of motion and neck supple. Right lower leg: No edema. Left lower leg: No edema. Skin:     General: Skin is warm and dry. Neurological:      General: No focal deficit present. Mental Status: He is alert and oriented to person, place, and time. Assessment & Plan    ICD-10-CM ICD-9-CM    1. Hypokalemia  T42.6 746.7 METABOLIC PANEL, BASIC      2. Adrenal incidentaloma ([address]  E27.8 255.8       3. Need for hepatitis C screening test  Z11.59 V73.89 HEPATITIS C AB      4. Paranoid schizophrenia ([facility] [address]  F20.0 295.30       5. Depression with suicidal ideation  F32. A 311     R45. 851 V62.84       6. Prediabetes  R73.03 790.29       7. Encounter to establish care with new doctor  Z76.89 V65.8         Diagnoses and all orders for this visit:    1. Hypokalemia  -     METABOLIC PANEL, BASIC  2. Adrenal incidentaloma ([address]  Repeat imaging in 12 months. 3. Need for hepatitis C screening test  -     HEPATITIS C AB; Future  4.  Paranoid schizophrenia ([facility] [address]  Managed per  Jennifer. Continue lithium, Ingrezza, and Haldol as prescribed. 5. Depression with suicidal ideation  Managed per Dr. Jaylan Sun. Continue fluoxetine 40mg daily. 6. Prediabetes  Last A1c 5.2 in 02/2022. Plan to repeat in 1 year. 7. Encounter to establish care with new doctor  Chart reviewed. History collected from patient's brother who is caregiver. Care gaps discussed. Aspects of this note have been generated using voice recognition software. Despite editing, there may be some syntax errors. Follow-up and Dispositions    Return in about 1 year (around 11/16/2023) for Medicare wellness.        Winnebagojosh Bundy, DO

## 2022-11-17 LAB
BUN SERPL-MCNC: 15 MG/DL (ref 6–24)
BUN/CREAT SERPL: 13 (ref 9–20)
CALCIUM SERPL-MCNC: 9.1 MG/DL (ref 8.7–10.2)
CHLORIDE SERPL-SCNC: 104 MMOL/L (ref 96–106)
CO2 SERPL-SCNC: 21 MMOL/L (ref 20–29)
CREAT SERPL-MCNC: 1.15 MG/DL (ref 0.76–1.27)
EGFR: 73 ML/MIN/1.73
GLUCOSE SERPL-MCNC: 154 MG/DL (ref 70–99)
HCV AB S/CO SERPL IA: <0.1 S/CO RATIO (ref 0–0.9)
POTASSIUM SERPL-SCNC: 3.7 MMOL/L (ref 3.5–5.2)
SODIUM SERPL-SCNC: 141 MMOL/L (ref 134–144)

## 2022-11-17 NOTE — PROGRESS NOTES
Glucose was elevated at 154. Make sure you're maintaining a healthy diabetic diet. Electrolytes and kidney function were normal.   Hepatitis C screening was negative.

## 2022-11-22 ENCOUNTER — TELEPHONE (OUTPATIENT)
Dept: FAMILY MEDICINE CLINIC | Age: 57
End: 2022-11-22

## 2022-11-29 ENCOUNTER — CLINICAL SUPPORT (OUTPATIENT)
Dept: FAMILY MEDICINE CLINIC | Age: 57
End: 2022-11-29
Payer: MEDICARE

## 2022-11-29 DIAGNOSIS — F20.0 PARANOID SCHIZOPHRENIA (HCC): Primary | ICD-10-CM

## 2022-11-29 RX ORDER — HALOPERIDOL DECANOATE 100 MG/ML
100 INJECTION INTRAMUSCULAR
Status: SHIPPED | OUTPATIENT
Start: 2022-11-29

## 2022-11-29 NOTE — PROGRESS NOTES
Haloperidol Decanoate 100mg/ml  NDC: [address]  Lot#: [id number]  Exp: 05/2023    Location: [place] [place]  *brother states no issues with last dose

## 2022-12-01 RX ADMIN — HALOPERIDOL DECANOATE 100 MG: 100 INJECTION INTRAMUSCULAR at 15:00

## 2022-12-27 ENCOUNTER — CLINICAL SUPPORT (OUTPATIENT)
Dept: FAMILY MEDICINE CLINIC | Age: 57
End: 2022-12-27
Payer: MEDICARE

## 2022-12-27 DIAGNOSIS — F20.0 PARANOID SCHIZOPHRENIA (HCC): Primary | ICD-10-CM

## 2022-12-27 PROCEDURE — 96372 THER/PROPH/DIAG INJ SC/IM: CPT | Performed by: FAMILY MEDICINE

## 2022-12-27 NOTE — PROGRESS NOTES
Haloperidol Decanoate 50mg/ml  NDC: 76008-42776  Lot#: [id number]  Exp: 03/2024     Location: [place] [place]  *brother states no issues with last dose

## 2023-01-06 ENCOUNTER — OFFICE VISIT (OUTPATIENT)
Dept: FAMILY MEDICINE CLINIC | Age: 58
End: 2023-01-06
Payer: MEDICARE

## 2023-01-06 VITALS
SYSTOLIC BLOOD PRESSURE: 128 MMHG | HEIGHT: 60 IN | TEMPERATURE: 97.3 F | DIASTOLIC BLOOD PRESSURE: 75 MMHG | OXYGEN SATURATION: 97 % | HEART RATE: 66 BPM | WEIGHT: 141 LBS | RESPIRATION RATE: 18 BRPM | BODY MASS INDEX: 27.68 KG/M2

## 2023-01-06 DIAGNOSIS — J30.9 ALLERGIC RHINITIS, UNSPECIFIED SEASONALITY, UNSPECIFIED TRIGGER: Primary | ICD-10-CM

## 2023-01-06 PROCEDURE — G9717 DOC PT DX DEP/BP F/U NT REQ: HCPCS | Performed by: FAMILY MEDICINE

## 2023-01-06 PROCEDURE — G8419 CALC BMI OUT NRM PARAM NOF/U: HCPCS | Performed by: FAMILY MEDICINE

## 2023-01-06 PROCEDURE — 99213 OFFICE O/P EST LOW 20 MIN: CPT | Performed by: FAMILY MEDICINE

## 2023-01-06 PROCEDURE — G8427 DOCREV CUR MEDS BY ELIG CLIN: HCPCS | Performed by: FAMILY MEDICINE

## 2023-01-06 PROCEDURE — 3017F COLORECTAL CA SCREEN DOC REV: CPT | Performed by: FAMILY MEDICINE

## 2023-01-06 RX ORDER — PALIPERIDONE 3 MG/1
3 TABLET, EXTENDED RELEASE ORAL
COMMUNITY
Start: 2022-12-12

## 2023-01-06 RX ORDER — ZOLPIDEM TARTRATE 10 MG/1
TABLET ORAL
COMMUNITY
Start: 2023-01-05 | End: 2023-01-06

## 2023-01-06 NOTE — PROGRESS NOTES
1. Have you been to the ER, urgent care clinic since your last visit? Hospitalized since your last visit? No    2. Have you seen or consulted any other health care providers outside of the [address] [place] [address] since your last visit? Include any pap smears or colon screening.  No    Chief Complaint   Patient presents with    Nasal Discharge     Visit Vitals  /75 (BP 1 Location: Left upper arm, BP Patient Position: Sitting, BP Cuff Size: Adult long)   Pulse 66   Temp 97.3 °F (36.3 °C) (Temporal)   Resp 18   Ht 5' (1.524 m)   Wt 141 lb (64 kg)   SpO2 97%   BMI 27.54 kg/m²

## 2023-01-06 NOTE — PROGRESS NOTES
Subjective  Chief Complaint   Patient presents with    Nasal Discharge     HPI:  [de-identified] T Candi is a 59 y.o. male with medical problems as listed below who presents for rhinorrhea. Patient is accompanied by brother who is main caretaker and history is collected from him. Rhinitis: Ongoing issue for months. Patient has been taking Flonase and Claritin but it has not been helping. He has also tried Zyrtec in the recent past without much success. Patient Active Problem List   Diagnosis Code    Schizophrenia ([facility] [address] F20.9    Tremor R25.1    Prediabetes R73.03    Memory impairment R41.3    Gait disturbance R26.9    Depression with suicidal ideation F32. A, R45.851    Vitamin D deficiency E55.9    Seizure ([facility] [address] R56.9    Ad[facility]al incidentaloma ([facility] E27.8     Family History   Problem Relation Age of Onset    Dementia Mother     Pancreatic Cancer Father      Social History     Tobacco Use    Smoking status: Never    Smokeless tobacco: Never   Vaping Use    Vaping Use: Never used   Substance Use Topics    Alcohol use: Never    Drug use: Never     Current Outpatient Medications on File Prior to Visit   Medication Sig Dispense Refill    Invega 3 mg SR tablet Take 3 mg by mouth nightly. FLUoxetine (PROzac) 40 mg capsule Take 40 mg by mouth daily. fluticasone propionate (FLONASE) 50 mcg/actuation nasal spray 2 Sprays by Both Nostrils route daily as needed for Rhinitis or Allergies. 1 Each 5    temazepam (RESTORIL) 30 mg capsule Take 30 mg by mouth nightly. FLUoxetine (PROzac) 20 mg capsule Take 20 mg by mouth daily. 3 times per day 60 mg      [DISCONTINUED] zolpidem (AMBIEN) 10 mg tablet  (Patient not taking: Reported on 1/6/2023)      lithium carbonate 300 mg capsule Take 300 mg by mouth nightly. [DISCONTINUED] valbenazine (Ingrezza) 60 mg cap Take 1 Capsule by mouth nightly.  (Patient not taking: Reported on 1/6/2023)      [DISCONTINUED] potassium chloride (KAON 10%) 20 mEq/15 mL solution Take 15 mL by mouth daily. (Patient not taking: Reported on 1/6/2023) 480 mL 3    [DISCONTINUED] lithium carbonate SR (LITHOBID) 300 mg CR tablet Take 600 mg by mouth daily. (Patient not taking: Reported on 1/6/2023)       Current Facility-Administered Medications on File Prior to Visit   Medication Dose Route Frequency Provider Last Rate Last Admin    haloperidol decanoate (HALDOL DECANOATE) 100 mg/mL LA injection 100 mg  100 mg IntraMUSCular Q28D Jovana Pichardor, DO   100 mg at 12/01/22 1500     No Known Allergies  Review of Systems   Constitutional:  Negative for chills and fever. HENT:  Negative for ear pain and sinus pain. Rhinorrhea   Respiratory:  Negative for cough and shortness of breath. Objective  Visit Vitals  /75 (BP 1 Location: Left upper arm, BP Patient Position: Sitting, BP Cuff Size: Adult long)   Pulse 66   Temp 97.3 °F (36.3 °C) (Temporal)   Resp 18   Ht 5' (1.524 m)   Wt 141 lb (64 kg)   SpO2 97%   BMI 27.54 kg/m²     Physical Exam  Constitutional:       General: He is not in acute distress. Appearance: Normal appearance. He is not ill-appearing. HENT:      Head: Normocephalic and atraumatic. Eyes:      Extraocular Movements: Extraocular movements intact. Conjunctiva/sclera: Conjunctivae normal.   Pulmonary:      Effort: Pulmonary effort is normal. No respiratory distress. Musculoskeletal:         General: No swelling. Normal range of motion. Cervical back: Normal range of motion and neck supple. Skin:     General: Skin is warm and dry. Neurological:      General: No focal deficit present. Mental Status: He is alert. Assessment & Plan    ICD-10-CM ICD-9-CM    1. Allergic rhinitis, unspecified seasonality, unspecified trigger  J30.9 477.9         Diagnoses and all orders for this visit:    1. Allergic rhinitis, unspecified seasonality, unspecified trigger  After discussion, patient is incorrectly administering nasal spray.  Reviewed proper administration of Flonase. If this doesn't work, suggested trial of azelastine nasal spray. Discontinue Claritin. Recommended trial of nasal saline rinses if patient can comply. If none of the above improves symptoms, brother would like for referral for allergy testing.      Marcia Perry, DO

## 2023-01-17 ENCOUNTER — TELEPHONE (OUTPATIENT)
Dept: FAMILY MEDICINE CLINIC | Age: 58
End: 2023-01-17

## 2023-01-17 DIAGNOSIS — J30.9 ALLERGIC RHINITIS, UNSPECIFIED SEASONALITY, UNSPECIFIED TRIGGER: Primary | ICD-10-CM

## 2023-01-17 NOTE — TELEPHONE ENCOUNTER
Patients brother called and stated patients nose is still running and would like to have a referral to an Allergy doctor. Patients brother stated this was talked about with Dr. Rafiq Bradley. Please [phone] when done.

## 2023-01-20 NOTE — TELEPHONE ENCOUNTER
Please let patient know referral to ENT has been placed for allergies. He can call Dr. Carvajal Monday phone number to set up an appointment.

## 2023-01-30 ENCOUNTER — OFFICE VISIT (OUTPATIENT)
Dept: ENT CLINIC | Age: 58
End: 2023-01-30
Payer: MEDICARE

## 2023-01-30 VITALS
SYSTOLIC BLOOD PRESSURE: 122 MMHG | DIASTOLIC BLOOD PRESSURE: 78 MMHG | OXYGEN SATURATION: 98 % | WEIGHT: 142 LBS | RESPIRATION RATE: 18 BRPM | HEIGHT: 61 IN | BODY MASS INDEX: 26.81 KG/M2 | HEART RATE: 62 BPM

## 2023-01-30 DIAGNOSIS — J30.9 CHRONIC ALLERGIC RHINITIS: Primary | ICD-10-CM

## 2023-01-30 DIAGNOSIS — H61.23 IMPACTED CERUMEN OF BOTH EARS: ICD-10-CM

## 2023-01-30 PROCEDURE — 69210 REMOVE IMPACTED EAR WAX UNI: CPT | Performed by: NURSE PRACTITIONER

## 2023-01-30 PROCEDURE — 99202 OFFICE O/P NEW SF 15 MIN: CPT | Performed by: NURSE PRACTITIONER

## 2023-01-30 NOTE — PROGRESS NOTES
Chief Complaint   Patient presents with    Watery Eyes    Nasal Discharge    New Patient       Visit Vitals  /78 (BP 1 Location: Right upper arm, BP Patient Position: Sitting, BP Cuff Size: Adult)   Pulse 62   Resp 18   Ht 5' 1\" (1.549 m)   Wt 142 lb (64.4 kg)   SpO2 98%   BMI 26.83 kg/m²

## 2023-01-30 NOTE — PROGRESS NOTES
Subjective: Shirley Christopher   59 y.o.   1965     New Patient Visit  This is a 59 y.o. male who presents to the office today with chief complaint of runny nose and watery eyes. They have tried multiple medications for this. He has not had any relief. He has tried flonase, zyrtec, and claritin. His symptoms are moderate. Nothing seems to make it better or worse. Location - Nose and eyes    Quality - runny nose and itchy watery eyes     Severity -  moderate    Duration - 6 months     Timing - constant    Context - Patient has tried multiple drugs including Claritin, Zyrtec, and Fluticasone with no relief     Modifying Features - none    Associated symptoms/signs - none      Review of Systems  Review of Systems   Constitutional: Negative. HENT:  Positive for congestion. Runny nose    Eyes:         Watery eyes    Respiratory:  Positive for sputum production. Negative for cough, hemoptysis, shortness of breath and wheezing. Cardiovascular: Negative. Gastrointestinal: Negative. Genitourinary: Negative. Musculoskeletal: Negative. Skin: Negative. Neurological: Negative. Endo/Heme/Allergies: Negative. Psychiatric/Behavioral: Negative. Past Medical History:   Diagnosis Date    Anxiety     Depression     Intellectual disability     Schizoaffective disorder, bipolar type ([facility] [address]     Schizophrenia ([facility] [address]      History reviewed. No pertinent surgical history. Family History   Problem Relation Age of Onset    Dementia Mother     Pancreatic Cancer Father      Social History     Tobacco Use    Smoking status: Never    Smokeless tobacco: Never   Substance Use Topics    Alcohol use: Never      Prior to Admission medications    Medication Sig Start Date End Date Taking? Authorizing Provider   Invega 3 mg SR tablet Take 3 mg by mouth nightly. 12/12/22  Yes Provider, Historical   FLUoxetine (PROzac) 40 mg capsule Take 60 mg by mouth daily.    Yes Provider, Historical   temazepam (RESTORIL) 30 mg capsule Take 30 mg by mouth nightly. 9/29/22  Yes Provider, Historical   lithium carbonate 300 mg capsule Take 900 mg by mouth nightly. Yes Provider, Historical   fluticasone propionate (FLONASE) 50 mcg/actuation nasal spray 2 Sprays by Both Nostrils route daily as needed for Rhinitis or Allergies. Patient not taking: Reported on 1/30/2023 11/3/22   Trevor Corado,    FLUoxetine (PROzac) 20 mg capsule Take 20 mg by mouth daily. 3 times per day 60 mg  Patient not taking: Reported on 1/30/2023    Other, MD Adithya        No Known Allergies      Objective:     Visit Vitals  /78 (BP 1 Location: Right upper arm, BP Patient Position: Sitting, BP Cuff Size: Adult)   Pulse 62   Resp 18   Ht 5' 1\" (1.549 m)   Wt 142 lb (64.4 kg)   SpO2 98%   BMI 26.83 kg/m²        Physical Exam  Constitutional:       General: He is not in acute distress. Appearance: Normal appearance. He is normal weight. He is not ill-appearing, toxic-appearing or diaphoretic. HENT:      Head: Normocephalic and atraumatic. Right Ear: Tympanic membrane and external ear normal. There is impacted cerumen. Left Ear: Tympanic membrane, ear canal and external ear normal. There is impacted cerumen. Nose: Rhinorrhea present. Mouth/Throat:      Mouth: Mucous membranes are moist.      Pharynx: Oropharynx is clear. No oropharyngeal exudate or posterior oropharyngeal erythema. Eyes:      Extraocular Movements: Extraocular movements intact. Conjunctiva/sclera: Conjunctivae normal.      Pupils: Pupils are equal, round, and reactive to light. Neck:      Vascular: No carotid bruit. Cardiovascular:      Rate and Rhythm: Normal rate and regular rhythm. Pulmonary:      Effort: Pulmonary effort is normal.      Breath sounds: Normal breath sounds. Abdominal:      General: Abdomen is flat. Musculoskeletal:         General: Normal range of motion. Cervical back: Normal range of motion. No rigidity or tenderness. Lymphadenopathy:      Cervical: No cervical adenopathy. Skin:     General: Skin is warm. Capillary Refill: Capillary refill takes less than 2 seconds. Neurological:      General: No focal deficit present. Mental Status: He is alert and oriented to person, place, and time. Mental status is at baseline. Psychiatric:         Mood and Affect: Mood normal.         Thought Content: Thought content normal.       Assessment/Plan:     Encounter Diagnoses   Name Primary? Chronic allergic rhinitis Yes    Impacted cerumen of both ears      No orders of the defined types were placed in this encounter.    -Chronic Rhinitis   Has been on many treatments with no decrease in symptoms. Will refer for allergy testing. Once allergy testing is completed may try singulair and astelin, starting treatment now would not be extremely beneficial as he would have to stop for allergy testing.     -Impacted cerumen in both ears    -Cerumen cleaned with suction, able to clear bilateral canals, right canal erythematous. Applied 4 drops cirpodex prior to patient departure.            Thank you for referring this patient,    Drea Flores AGACNP-BC     [address]  ENT  [phone]

## 2023-02-27 ENCOUNTER — OFFICE VISIT (OUTPATIENT)
Dept: FAMILY MEDICINE CLINIC | Age: 58
End: 2023-02-27
Payer: MEDICARE

## 2023-02-27 ENCOUNTER — NURSE TRIAGE (OUTPATIENT)
Dept: OTHER | Facility: CLINIC | Age: 58
End: 2023-02-27

## 2023-02-27 VITALS
OXYGEN SATURATION: 97 % | WEIGHT: 143 LBS | HEART RATE: 76 BPM | SYSTOLIC BLOOD PRESSURE: 129 MMHG | BODY MASS INDEX: 27 KG/M2 | DIASTOLIC BLOOD PRESSURE: 80 MMHG | RESPIRATION RATE: 18 BRPM | TEMPERATURE: 97.5 F | HEIGHT: 61 IN

## 2023-02-27 DIAGNOSIS — K08.9 POOR DENTITION: ICD-10-CM

## 2023-02-27 PROCEDURE — G8427 DOCREV CUR MEDS BY ELIG CLIN: HCPCS | Performed by: FAMILY MEDICINE

## 2023-02-27 PROCEDURE — G8419 CALC BMI OUT NRM PARAM NOF/U: HCPCS | Performed by: FAMILY MEDICINE

## 2023-02-27 PROCEDURE — 99213 OFFICE O/P EST LOW 20 MIN: CPT | Performed by: FAMILY MEDICINE

## 2023-02-27 PROCEDURE — 3017F COLORECTAL CA SCREEN DOC REV: CPT | Performed by: FAMILY MEDICINE

## 2023-02-27 PROCEDURE — G9717 DOC PT DX DEP/BP F/U NT REQ: HCPCS | Performed by: FAMILY MEDICINE

## 2023-02-27 NOTE — PROGRESS NOTES
1. Have you been to the ER, urgent care clinic since your last visit? Hospitalized since your last visit? No    2. Have you seen or consulted any other health care providers outside of the [address] [place] [address] since your last visit? Include any pap smears or colon screening.  No    Chief Complaint   Patient presents with    Mouth Swelling     Visit Vitals  /80 (BP 1 Location: Right upper arm, BP Patient Position: Sitting, BP Cuff Size: Adult)   Pulse 76   Temp 97.5 °F (36.4 °C) (Temporal)   Resp 18   Ht 5' 1\" (1.549 m)   Wt 143 lb (64.9 kg)   SpO2 97%   BMI 27.02 kg/m²

## 2023-02-27 NOTE — TELEPHONE ENCOUNTER
[facility]    Received call from [place] at [facility] with Red Flag Complaint. Subjective: Caller states \"my brother says he has swollen lips and gums. I looked at his mouth and his lips don't look swollen but the gums look discolored\"     Current Symptoms:     Onset: 2 weeks ago;     Associated Symptoms:     Pain Severity:  denies    Temperature:      What has been tried:     LMP: NA Pregnant: NA    Recommended disposition: See PCP within 3 Days    Care advice provided, patient verbalizes understanding; denies any other questions or concerns; instructed to call back for any new or worsening symptoms. Patient/Caller agrees with recommended disposition; writer provided warm transfer to [place] at [facility] for appointment scheduling    Attention Provider: Thank you for allowing me to participate in the care of your patient. The patient was connected to triage in response to information provided to the ECC. Please do not respond through this encounter as the response is not directed to a shared pool.     Reason for Disposition   Patient wants to be seen    Protocols used: Mouth Symptoms-ADULT-[place]

## 2023-02-27 NOTE — PROGRESS NOTES
Subjective  Chief Complaint   Patient presents with    Mouth Swelling     HPI:  [de-identified] T Candi is a 59 y.o. male with medical problems as listed below who presents for subjective swelling of lips and gums. Patient is accompanied by brother who is primary caregiver and provides all of history. Swelling: Brother reports that patient started complaining of swelling of his gums and lips today. No new food or medications. No difficulty breathing, no pain. The brother states that he can't see any swelling. They are advised to be seen today by triage nurse. Patient Active Problem List   Diagnosis Code    Schizophrenia ([facility] F20.9    Tremor R25.1    Prediabetes R73.03    Memory impairment R41.3    Gait disturbance R26.9    Depression with suicidal ideation F32. A, R45.851    Vitamin D deficiency E55.9    Seizure ([facility] [address] R56.9    Adrenal incidentaloma ([facility] E27.8    Poor dentition K08.9     Family History   Problem Relation Age of Onset    Dementia Mother     Pancreatic Cancer Father      Social History     Tobacco Use    Smoking status: Never    Smokeless tobacco: Never   Vaping Use    Vaping Use: Never used   Substance Use Topics    Alcohol use: Never    Drug use: Never     Current Outpatient Medications on File Prior to Visit   Medication Sig Dispense Refill    Invega 3 mg SR tablet Take 3 mg by mouth nightly. FLUoxetine (PROzac) 40 mg capsule Take 60 mg by mouth daily. temazepam (RESTORIL) 30 mg capsule Take 30 mg by mouth nightly. lithium carbonate 300 mg capsule Take 900 mg by mouth nightly. fluticasone propionate (FLONASE) 50 mcg/actuation nasal spray 2 Sprays by Both Nostrils route daily as needed for Rhinitis or Allergies. (Patient not taking: No sig reported) 1 Each 5    FLUoxetine (PROzac) 20 mg capsule Take 20 mg by mouth daily.  3 times per day 60 mg (Patient not taking: No sig reported)       Current Facility-Administered Medications on File Prior to Visit   Medication Dose Route Frequency Provider Last Rate Last Admin    haloperidol decanoate (HALDOL DECANOATE) 100 mg/mL LA injection 100 mg  100 mg IntraMUSCular Q28D Arnulfo Pichardo DO   100 mg at 12/01/22 1500     No Known Allergies  Review of Systems   Constitutional: Negative. HENT: Negative. Objective  Visit Vitals  /80 (BP 1 Location: Right upper arm, BP Patient Position: Sitting, BP Cuff Size: Adult)   Pulse 76   Temp 97.5 °F (36.4 °C) (Temporal)   Resp 18   Ht 5' 1\" (1.549 m)   Wt 143 lb (64.9 kg)   SpO2 97%   BMI 27.02 kg/m²     Physical Exam  Constitutional:       General: He is not in acute distress. Appearance: Normal appearance. HENT:      Head: Normocephalic and atraumatic. Mouth/Throat:      Mouth: Mucous membranes are moist.      Pharynx: Oropharynx is clear. No oropharyngeal exudate or posterior oropharyngeal erythema. Comments: No swelling of intraoral tissue or lips  Pulmonary:      Effort: Pulmonary effort is normal. No respiratory distress. Musculoskeletal:         General: Normal range of motion. Cervical back: Normal range of motion. Skin:     General: Skin is warm and dry. Neurological:      General: No focal deficit present. Mental Status: He is alert. Assessment & Plan    ICD-10-CM ICD-9-CM    1. Poor dentition  K08.9 525.9         Diagnoses and all orders for this visit:    1. Poor dentition  No obvious swelling of lips, tongue, mucosal tissue or gingiva. Patient does have poor dentition. He is missing most of his upper teeth. There is possibly a small, non-inflamed blister of the upper gingiva. Encouraged routine dental care and advised brother to keep scheduled follow up with dentist which is in March.      Sunny Quiñones DO

## 2023-03-21 ENCOUNTER — OFFICE VISIT (OUTPATIENT)
Dept: ENT CLINIC | Age: 58
End: 2023-03-21

## 2023-03-21 VITALS — DIASTOLIC BLOOD PRESSURE: 76 MMHG | SYSTOLIC BLOOD PRESSURE: 114 MMHG | OXYGEN SATURATION: 95 % | HEART RATE: 65 BPM

## 2023-03-21 DIAGNOSIS — J30.9 CHRONIC ALLERGIC RHINITIS: Primary | ICD-10-CM

## 2023-03-21 LAB
ALTERNARIA TENUIS IGE, RESP1ALTN: <3
ASPERGILLUS FUMIGATUS 1: <3
BAHIA GRASS,G17A: <3
BERMUDA GRASS IGE, RESP1BERG: <3
BIRCH,TRE1: <3
CAT EPITHELIUM, IGE, CAT: <3
CLADOSPORIUM, IGE, CLAD: <3
COCKROACH,GERMAN, 670778: <3
COTTONWOOD,IGE, CTWD: <3
DOG DANDER,IGE, DOGD: <3
DUST MITE: <3
ELM,IGE, ELM: <3
ENGLISH PLANTAIN, IGE, EGPL: <3
LAMBS QUARTER, IGE, LAMQ: <3
MAPLE/BOX ELDER,TRE3: <3
MOUSE EPITHELIA, 069518: <3
OAK, IGE, OAK: <3
OTHER,OTHU: <3
PIGWEED,WEE7: <3
RAGWEED MIX IGE: <3
SYCAMORE,TRE7: <3
T057-IGE CEDAR, RED: <3
TIMOTHY GRASS IGE, RESP1TIMG: <3
WHITE ASH, ASHW1M: <3

## 2023-03-21 RX ORDER — EPINEPHRINE 0.3 MG/.3ML
0.3 INJECTION SUBCUTANEOUS
Qty: 1 EACH | Refills: 1 | Status: SHIPPED | OUTPATIENT
Start: 2023-03-21 | End: 2023-03-21

## 2023-03-21 RX ORDER — DERMATOPHAGOIDES PTERONYSSINUS AND DERMATOPHAGOIDES FARINAE 6; 6 [ARB'U]/1; [ARB'U]/1
1 TABLET SUBLINGUAL DAILY
Qty: 30 TABLET | Refills: 1 | Status: SHIPPED | OUTPATIENT
Start: 2023-03-21 | End: 2023-04-20

## 2023-03-21 NOTE — PROGRESS NOTES
I have reviewed the notes, assessments, and/or procedures performed by Milton Crum RN, I concur with her/his documentation of [facility] Candi.

## 2023-03-21 NOTE — PROGRESS NOTES
Subjective: Shirley Christopher   59 y.o.   1965     Followup Visit  This is a 59 y.o. male who is being seen for follow-up after allergy testing. He was found to have allergies to dust mites and mouse epithelium. He has no known exposure to mice. Does not attend day programs or go to facilities where mice may be present. Review of Systems  Review of Systems   Constitutional: Negative. HENT:  Positive for congestion and sinus pain. Eyes: Negative. Respiratory:  Positive for cough. Cardiovascular: Negative. Gastrointestinal: Negative. Genitourinary: Negative. Musculoskeletal: Negative. Skin: Negative. Neurological: Negative. Endo/Heme/Allergies:  Positive for environmental allergies. Psychiatric/Behavioral: Negative. Past Medical History:   Diagnosis Date    Anxiety     Depression     Intellectual disability     Schizoaffective disorder, bipolar type ([facility] [address]     Schizophrenia ([facility]      Prior to Admission medications    Medication Sig Start Date End Date Taking? Authorizing Provider   Invega 3 mg SR tablet Take 3 mg by mouth nightly. 12/12/22   Provider, Historical   FLUoxetine (PROzac) 40 mg capsule Take 60 mg by mouth daily. Provider, Historical   fluticasone propionate (FLONASE) 50 mcg/actuation nasal spray 2 Sprays by Both Nostrils route daily as needed for Rhinitis or Allergies. Patient not taking: No sig reported 11/3/22   Sunday Pichardo,    temazepam (RESTORIL) 30 mg capsule Take 30 mg by mouth nightly. 9/29/22   Provider, Historical   FLUoxetine (PROzac) 20 mg capsule Take 20 mg by mouth daily. 3 times per day 60 mg  Patient not taking: No sig reported    Other, MD Adithya   lithium carbonate 300 mg capsule Take 900 mg by mouth nightly. Provider, Historical            Objective: There were no vitals taken for this visit. Physical Exam  Constitutional:       General: He is not in acute distress. Appearance: Normal appearance.  He is normal weight. He is not ill-appearing, toxic-appearing or diaphoretic. HENT:      Head: Normocephalic and atraumatic. Right Ear: Tympanic membrane, ear canal and external ear normal. There is no impacted cerumen. Left Ear: Tympanic membrane, ear canal and external ear normal. There is no impacted cerumen. Nose: Congestion and rhinorrhea present. Mouth/Throat:      Mouth: Mucous membranes are moist.      Pharynx: Oropharynx is clear. No oropharyngeal exudate or posterior oropharyngeal erythema. Eyes:      Extraocular Movements: Extraocular movements intact. Conjunctiva/sclera: Conjunctivae normal.      Pupils: Pupils are equal, round, and reactive to light. Neck:      Vascular: No carotid bruit. Cardiovascular:      Rate and Rhythm: Normal rate and regular rhythm. Heart sounds: No murmur heard. No friction rub. No gallop. Pulmonary:      Effort: Pulmonary effort is normal.      Breath sounds: Normal breath sounds. Abdominal:      General: Abdomen is flat. Bowel sounds are normal. There is no distension. Palpations: Abdomen is soft. There is no mass. Tenderness: There is no abdominal tenderness. There is no guarding or rebound. Hernia: No hernia is present. Musculoskeletal:         General: No swelling, tenderness, deformity or signs of injury. Normal range of motion. Cervical back: Normal range of motion and neck supple. No rigidity or tenderness. Lymphadenopathy:      Cervical: No cervical adenopathy. Skin:     General: Skin is warm and dry. Capillary Refill: Capillary refill takes less than 2 seconds. Coloration: Skin is not jaundiced or pale. Findings: No bruising, erythema, lesion or rash. Neurological:      General: No focal deficit present. Mental Status: He is alert and oriented to person, place, and time. Mental status is at baseline. Cranial Nerves: No cranial nerve deficit. Sensory: No sensory deficit. Motor: No weakness. Coordination: Coordination normal.      Gait: Gait normal.      Deep Tendon Reflexes: Reflexes normal.   Psychiatric:         Mood and Affect: Mood normal.         Behavior: Behavior normal.         Thought Content: Thought content normal.         Judgment: Judgment normal.       Assessment/Plan:     Encounter Diagnoses   Name Primary? Chronic allergic rhinitis Yes     Orders Placed This Encounter    REFERRAL TO ALLERGY     Chronic rhinitis secondary to allegies  -Found to be dust mites and mouse  - Will plan for treatment with SL Odactra.

## 2023-04-05 ENCOUNTER — TELEPHONE (OUTPATIENT)
Dept: ENT CLINIC | Age: 58
End: 2023-04-05

## 2023-04-05 NOTE — TELEPHONE ENCOUNTER
Pt brother called to ask if we have gotten the authorization for this pt allergy medication. .    Pt stated he was not recommended for allergy injections but for another type of medication.      Please advise pt brother

## 2023-04-17 ENCOUNTER — OFFICE VISIT (OUTPATIENT)
Dept: ENT CLINIC | Age: 58
End: 2023-04-17

## 2023-04-17 VITALS
WEIGHT: 142 LBS | DIASTOLIC BLOOD PRESSURE: 84 MMHG | BODY MASS INDEX: 26.81 KG/M2 | OXYGEN SATURATION: 98 % | HEIGHT: 61 IN | HEART RATE: 63 BPM | SYSTOLIC BLOOD PRESSURE: 120 MMHG

## 2023-04-17 DIAGNOSIS — J30.89 ALLERGIC RHINITIS DUE TO DUST MITE: Primary | ICD-10-CM

## 2023-04-17 NOTE — PROGRESS NOTES
I have reviewed the notes, assessments, and/or procedures performed by Neel Bass RN, I [facility] with her[facility].

## 2023-05-02 ENCOUNTER — TELEPHONE (OUTPATIENT)
Dept: ENT CLINIC | Age: 58
End: 2023-05-02

## 2023-07-26 ENCOUNTER — OFFICE VISIT (OUTPATIENT)
Age: 58
End: 2023-07-26

## 2023-07-26 VITALS
DIASTOLIC BLOOD PRESSURE: 90 MMHG | BODY MASS INDEX: 27.38 KG/M2 | RESPIRATION RATE: 16 BRPM | OXYGEN SATURATION: 99 % | HEART RATE: 88 BPM | WEIGHT: 145 LBS | HEIGHT: 61 IN | SYSTOLIC BLOOD PRESSURE: 160 MMHG

## 2023-07-26 DIAGNOSIS — J30.89 NON-SEASONAL ALLERGIC RHINITIS DUE TO OTHER ALLERGIC TRIGGER: ICD-10-CM

## 2023-07-26 DIAGNOSIS — J31.0 CHRONIC RHINITIS: Primary | ICD-10-CM

## 2023-07-26 RX ORDER — DERMATOPHAGOIDES PTERONYSSINUS AND DERMATOPHAGOIDES FARINAE 6; 6 [ARB'U]/1; [ARB'U]/1
1 TABLET SUBLINGUAL DAILY
Qty: 90 TABLET | Refills: 1 | Status: SHIPPED | OUTPATIENT
Start: 2023-07-26 | End: 2023-10-24

## 2023-07-26 RX ORDER — IPRATROPIUM BROMIDE 21 UG/1
1 SPRAY, METERED NASAL 2 TIMES DAILY PRN
Qty: 30 ML | Refills: 3 | Status: SHIPPED | OUTPATIENT
Start: 2023-07-26

## 2023-07-28 NOTE — PROGRESS NOTES
Otolaryngology-Head and Neck Surgery  Follow Up Patient Visit     Patient: Ramakrishna Wilson  YOB: 1965  MRN: [id number]  Date of Service:  7/26/2023    Chief Complaint: Follow up allergies     History of Present Illness: Ramakrishna Wilson is a 59 y.o. male who was seen previously by Skye Mercer NP    With history of dust and mice allergies, on odactra for the last 2-3 months    Cont to have chronic rhinitis which is bothersome    Pt and brother wonder about alternate options    Past Medical History:  Past Medical History:   Diagnosis Date    Anxiety     Depression     Intellectual disability     Schizoaffective disorder, bipolar type ([address])     Schizophrenia ([address])        Past Surgical History:   History reviewed. No pertinent surgical history. Medications:   Current Outpatient Medications   Medication Instructions    FLUoxetine (PROZAC) 20 mg, Oral, DAILY    FLUoxetine (PROZAC) 60 mg, Oral, DAILY    fluticasone (FLONASE) 50 MCG/ACT nasal spray 2 sprays, Nasal, DAILY PRN    house dust mite allergen extract (ODACTRA) 12 SQ-HDM SUBL sublingual tablet 1 each, Oral, DAILY    ipratropium (ATROVENT) 0.03 % nasal spray 1 spray, Each Nostril, 2 TIMES DAILY PRN    lithium 900 mg, Oral    paliperidone (INVEGA) 3 mg, Oral    temazepam (RESTORIL) 30 mg, Oral       Allergies:   No Known Allergies    Social History:   Social History     Tobacco Use    Smoking status: Never    Smokeless tobacco: Never   Substance Use Topics    Alcohol use: Never    Drug use: Never       Family History:  Family History   Problem Relation Age of Onset    Dementia Mother     Pancreatic Cancer Father        Review of Systems:  Consitutional: denies fever, excessive weight gain or loss. Eyes: denies diplopia, eye pain. Integumentary: denies new concerning skin lesions. Ears, Nose, Mouth, Throat: denies except as per HPI.   Endocrine: denies hot or cold intolerance, increased thirst.  Respiratory: denies cough, hemoptysis, wheezing  Gastrointestinal: denies

## 2023-08-09 ENCOUNTER — TELEMEDICINE (OUTPATIENT)
Facility: CLINIC | Age: 58
End: 2023-08-09
Payer: MEDICARE

## 2023-08-09 DIAGNOSIS — R35.89 POLYURIA: Primary | ICD-10-CM

## 2023-08-09 DIAGNOSIS — E11.9 TYPE 2 DIABETES MELLITUS WITHOUT COMPLICATION, WITHOUT LONG-TERM CURRENT USE OF INSULIN (HCC): ICD-10-CM

## 2023-08-09 PROBLEM — R63.1 EXCESSIVE THIRST: Status: ACTIVE | Noted: 2023-08-09

## 2023-08-09 PROBLEM — G83.20 MONOPARESIS OF ARM (HCC): Status: ACTIVE | Noted: 2023-08-09

## 2023-08-09 PROBLEM — F25.9 SCHIZOAFFECTIVE SCHIZOPHRENIA (HCC): Status: ACTIVE | Noted: 2023-08-09

## 2023-08-09 PROBLEM — M79.603 PAIN IN UPPER LIMB: Status: ACTIVE | Noted: 2023-08-09

## 2023-08-09 PROCEDURE — 1036F TOBACCO NON-USER: CPT | Performed by: FAMILY MEDICINE

## 2023-08-09 PROCEDURE — 3017F COLORECTAL CA SCREEN DOC REV: CPT | Performed by: FAMILY MEDICINE

## 2023-08-09 PROCEDURE — G8427 DOCREV CUR MEDS BY ELIG CLIN: HCPCS | Performed by: FAMILY MEDICINE

## 2023-08-09 PROCEDURE — 99214 OFFICE O/P EST MOD 30 MIN: CPT | Performed by: FAMILY MEDICINE

## 2023-08-09 PROCEDURE — G8419 CALC BMI OUT NRM PARAM NOF/U: HCPCS | Performed by: FAMILY MEDICINE

## 2023-08-09 PROCEDURE — 3046F HEMOGLOBIN A1C LEVEL >9.0%: CPT | Performed by: FAMILY MEDICINE

## 2023-08-09 PROCEDURE — 2022F DILAT RTA XM EVC RTNOPTHY: CPT | Performed by: FAMILY MEDICINE

## 2023-08-09 SDOH — ECONOMIC STABILITY: FOOD INSECURITY: WITHIN THE PAST 12 MONTHS, THE FOOD YOU BOUGHT JUST DIDN'T LAST AND YOU DIDN'T HAVE MONEY TO GET MORE.: NEVER TRUE

## 2023-08-09 SDOH — ECONOMIC STABILITY: INCOME INSECURITY: HOW HARD IS IT FOR YOU TO PAY FOR THE VERY BASICS LIKE FOOD, HOUSING, MEDICAL CARE, AND HEATING?: NOT HARD AT ALL

## 2023-08-09 SDOH — ECONOMIC STABILITY: HOUSING INSECURITY
IN THE LAST 12 MONTHS, WAS THERE A TIME WHEN YOU DID NOT HAVE A STEADY PLACE TO SLEEP OR SLEPT IN A SHELTER (INCLUDING NOW)?: NO

## 2023-08-09 SDOH — ECONOMIC STABILITY: TRANSPORTATION INSECURITY
IN THE PAST 12 MONTHS, HAS LACK OF TRANSPORTATION KEPT YOU FROM MEETINGS, WORK, OR FROM GETTING THINGS NEEDED FOR DAILY LIVING?: NO

## 2023-08-09 SDOH — ECONOMIC STABILITY: TRANSPORTATION INSECURITY
IN THE PAST 12 MONTHS, HAS THE LACK OF TRANSPORTATION KEPT YOU FROM MEDICAL APPOINTMENTS OR FROM GETTING MEDICATIONS?: NO

## 2023-08-09 SDOH — ECONOMIC STABILITY: HOUSING INSECURITY: IN THE LAST 12 MONTHS, HOW MANY PLACES HAVE YOU LIVED?: 1

## 2023-08-09 SDOH — ECONOMIC STABILITY: FOOD INSECURITY: WITHIN THE PAST 12 MONTHS, YOU WORRIED THAT YOUR FOOD WOULD RUN OUT BEFORE YOU GOT MONEY TO BUY MORE.: NEVER TRUE

## 2023-08-09 SDOH — ECONOMIC STABILITY: INCOME INSECURITY: IN THE LAST 12 MONTHS, WAS THERE A TIME WHEN YOU WERE NOT ABLE TO PAY THE MORTGAGE OR RENT ON TIME?: NO

## 2023-08-09 SDOH — HEALTH STABILITY: PHYSICAL HEALTH: ON AVERAGE, HOW MANY MINUTES DO YOU ENGAGE IN EXERCISE AT THIS LEVEL?: 0 MIN

## 2023-08-09 SDOH — HEALTH STABILITY: PHYSICAL HEALTH: ON AVERAGE, HOW MANY DAYS PER WEEK DO YOU ENGAGE IN MODERATE TO STRENUOUS EXERCISE (LIKE A BRISK WALK)?: 0 DAYS

## 2023-08-09 ASSESSMENT — SOCIAL DETERMINANTS OF HEALTH (SDOH)
WITHIN THE LAST YEAR, HAVE YOU BEEN HUMILIATED OR EMOTIONALLY ABUSED IN OTHER WAYS BY YOUR PARTNER OR EX-PARTNER?: NO
HOW OFTEN DO YOU ATTEND CHURCH OR RELIGIOUS SERVICES?: MORE THAN 4 TIMES PER YEAR
WITHIN THE LAST YEAR, HAVE YOU BEEN KICKED, HIT, SLAPPED, OR OTHERWISE PHYSICALLY HURT BY YOUR PARTNER OR EX-PARTNER?: NO
HOW OFTEN DO YOU ATTENT MEETINGS OF THE CLUB OR ORGANIZATION YOU BELONG TO?: NEVER
WITHIN THE LAST YEAR, HAVE TO BEEN RAPED OR FORCED TO HAVE ANY KIND OF SEXUAL ACTIVITY BY YOUR PARTNER OR EX-PARTNER?: NO
IN A TYPICAL WEEK, HOW MANY TIMES DO YOU TALK ON THE PHONE WITH FAMILY, FRIENDS, OR NEIGHBORS?: TWICE A WEEK
ARE YOU MARRIED, WIDOWED, DIVORCED, SEPARATED, NEVER MARRIED, OR LIVING WITH A PARTNER?: NEVER MARRIED
HOW OFTEN DO YOU GET TOGETHER WITH FRIENDS OR RELATIVES?: MORE THAN THREE TIMES A WEEK
DO YOU BELONG TO ANY CLUBS OR ORGANIZATIONS SUCH AS CHURCH GROUPS UNIONS, FRATERNAL OR ATHLETIC GROUPS, OR SCHOOL GROUPS?: NO
WITHIN THE LAST YEAR, HAVE YOU BEEN AFRAID OF YOUR PARTNER OR EX-PARTNER?: NO

## 2023-08-09 ASSESSMENT — LIFESTYLE VARIABLES
HOW OFTEN DO YOU HAVE A DRINK CONTAINING ALCOHOL: NEVER
HOW MANY STANDARD DRINKS CONTAINING ALCOHOL DO YOU HAVE ON A TYPICAL DAY: PATIENT DOES NOT DRINK

## 2023-08-09 NOTE — PROGRESS NOTES
1. Have you been to the ER, urgent care clinic since your last visit? Hospitalized since your last visit? No    2. Have you seen or consulted any other health care providers outside of the [address] [place] [address] Avenue since your last visit? Include any pap smears or colon screening. Yes, [facility]    Chief Complaint   Patient presents with    6 Month Follow-Up    Urinary Frequency    Other    c  There were no vitals taken for this visit.
AM    An electronic signature was used to authenticate this note.     Follow-up and Dispositions    Return in about 3 months (around 11/9/2023) for Medicare wellness exam.       Alen Landeros, DO

## 2023-08-11 LAB
APPEARANCE UR: CLEAR
BACTERIA #/AREA URNS HPF: NORMAL /[HPF]
BILIRUB UR QL STRIP: NEGATIVE
CASTS URNS QL MICRO: NORMAL /LPF
COLOR UR: YELLOW
EPI CELLS #/AREA URNS HPF: NORMAL /HPF (ref 0–10)
GLUCOSE UR QL STRIP: NEGATIVE
HBA1C MFR BLD: 5.5 % (ref 4.8–5.6)
HGB UR QL STRIP: NEGATIVE
KETONES UR QL STRIP: NEGATIVE
LEUKOCYTE ESTERASE UR QL STRIP: NEGATIVE
MICRO URNS: NORMAL
MICRO URNS: NORMAL
NITRITE UR QL STRIP: NEGATIVE
PH UR STRIP: 7 [PH] (ref 5–7.5)
PROT UR QL STRIP: NEGATIVE
RBC #/AREA URNS HPF: NORMAL /HPF (ref 0–2)
SP GR UR STRIP: 1.01 (ref 1–1.03)
URINALYSIS REFLEX: NORMAL
UROBILINOGEN UR STRIP-MCNC: 0.2 MG/DL (ref 0.2–1)
WBC #/AREA URNS HPF: NORMAL /HPF (ref 0–5)

## 2023-08-25 ENCOUNTER — HOSPITAL ENCOUNTER (EMERGENCY)
Facility: HOSPITAL | Age: 58
Discharge: HOME | End: 2023-08-25
Attending: EMERGENCY MEDICINE
Payer: MEDICARE

## 2023-08-25 ENCOUNTER — APPOINTMENT (OUTPATIENT)
Facility: HOSPITAL | Age: 58
End: 2023-08-25
Payer: MEDICARE

## 2023-08-25 ENCOUNTER — TELEPHONE (OUTPATIENT)
Facility: CLINIC | Age: 58
End: 2023-08-25

## 2023-08-25 ENCOUNTER — PATIENT MESSAGE (OUTPATIENT)
Facility: CLINIC | Age: 58
End: 2023-08-25

## 2023-08-25 VITALS
TEMPERATURE: 97.9 F | SYSTOLIC BLOOD PRESSURE: 133 MMHG | HEIGHT: 61 IN | DIASTOLIC BLOOD PRESSURE: 72 MMHG | WEIGHT: 145 LBS | HEART RATE: 80 BPM | BODY MASS INDEX: 27.38 KG/M2 | OXYGEN SATURATION: 97 % | RESPIRATION RATE: 18 BRPM

## 2023-08-25 DIAGNOSIS — N28.9 RENAL INSUFFICIENCY: ICD-10-CM

## 2023-08-25 DIAGNOSIS — G20 PARKINSON'S DISEASE (TREMOR, STIFFNESS, SLOW MOTION, UNSTABLE POSTURE) (HCC): Primary | ICD-10-CM

## 2023-08-25 LAB
ALBUMIN SERPL-MCNC: 3.7 G/DL (ref 3.5–5)
ALBUMIN/GLOB SERPL: 1.1 (ref 1.1–2.2)
ALP SERPL-CCNC: 89 U/L (ref 45–117)
ALT SERPL-CCNC: 26 U/L (ref 12–78)
ANION GAP SERPL CALC-SCNC: 10 MMOL/L (ref 5–15)
AST SERPL W P-5'-P-CCNC: 13 U/L (ref 15–37)
BASOPHILS # BLD: 0 K/UL (ref 0–0.1)
BASOPHILS NFR BLD: 0 % (ref 0–1)
BILIRUB SERPL-MCNC: 0.4 MG/DL (ref 0.2–1)
BUN SERPL-MCNC: 23 MG/DL (ref 6–20)
BUN/CREAT SERPL: 15 (ref 12–20)
CA-I BLD-MCNC: 9.2 MG/DL (ref 8.5–10.1)
CHLORIDE SERPL-SCNC: 111 MMOL/L (ref 97–108)
CO2 SERPL-SCNC: 24 MMOL/L (ref 21–32)
CREAT SERPL-MCNC: 1.55 MG/DL (ref 0.7–1.3)
DIFFERENTIAL METHOD BLD: ABNORMAL
EKG ATRIAL RATE: 80 BPM
EKG DIAGNOSIS: NORMAL
EKG P AXIS: 74 DEGREES
EKG P-R INTERVAL: 186 MS
EKG Q-T INTERVAL: 404 MS
EKG QRS DURATION: 88 MS
EKG QTC CALCULATION (BAZETT): 465 MS
EKG R AXIS: -6 DEGREES
EKG T AXIS: 33 DEGREES
EKG VENTRICULAR RATE: 80 BPM
EOSINOPHIL # BLD: 0.2 K/UL (ref 0–0.4)
EOSINOPHIL NFR BLD: 2 % (ref 0–7)
ERYTHROCYTE [DISTWIDTH] IN BLOOD BY AUTOMATED COUNT: 12.5 % (ref 11.5–14.5)
GLOBULIN SER CALC-MCNC: 3.3 G/DL (ref 2–4)
GLUCOSE SERPL-MCNC: 151 MG/DL (ref 65–100)
HCT VFR BLD AUTO: 41.4 % (ref 36.6–50.3)
HGB BLD-MCNC: 13.7 G/DL (ref 12.1–17)
IMM GRANULOCYTES # BLD AUTO: 0 K/UL (ref 0–0.04)
IMM GRANULOCYTES NFR BLD AUTO: 0 % (ref 0–0.5)
INR PPP: 1 (ref 0.9–1.1)
LITHIUM SERPL-SCNC: 0.6 MMOL/L (ref 0.6–1.2)
LYMPHOCYTES # BLD: 0.9 K/UL (ref 0.8–3.5)
LYMPHOCYTES NFR BLD: 10 % (ref 12–49)
MAGNESIUM SERPL-MCNC: 2.2 MG/DL (ref 1.6–2.4)
MCH RBC QN AUTO: 29.7 PG (ref 26–34)
MCHC RBC AUTO-ENTMCNC: 33.1 G/DL (ref 30–36.5)
MCV RBC AUTO: 89.6 FL (ref 80–99)
MONOCYTES # BLD: 0.8 K/UL (ref 0–1)
MONOCYTES NFR BLD: 9 % (ref 5–13)
NEUTS SEG # BLD: 7.6 K/UL (ref 1.8–8)
NEUTS SEG NFR BLD: 79 % (ref 32–75)
NRBC # BLD: 0 K/UL (ref 0–0.01)
NRBC BLD-RTO: 0 PER 100 WBC
PLATELET # BLD AUTO: 241 K/UL (ref 150–400)
PMV BLD AUTO: 10.2 FL (ref 8.9–12.9)
POTASSIUM SERPL-SCNC: 3.7 MMOL/L (ref 3.5–5.1)
PROT SERPL-MCNC: 7 G/DL (ref 6.4–8.2)
PROTHROMBIN TIME: 13.2 SEC (ref 11.9–14.6)
RBC # BLD AUTO: 4.62 M/UL (ref 4.1–5.7)
SODIUM SERPL-SCNC: 145 MMOL/L (ref 136–145)
TROPONIN I SERPL HS-MCNC: 9 NG/L (ref 0–76)
WBC # BLD AUTO: 9.7 K/UL (ref 4.1–11.1)

## 2023-08-25 PROCEDURE — 80178 ASSAY OF LITHIUM: CPT

## 2023-08-25 PROCEDURE — 85025 COMPLETE CBC W/AUTO DIFF WBC: CPT

## 2023-08-25 PROCEDURE — 93005 ELECTROCARDIOGRAM TRACING: CPT | Performed by: EMERGENCY MEDICINE

## 2023-08-25 PROCEDURE — 80053 COMPREHEN METABOLIC PANEL: CPT

## 2023-08-25 PROCEDURE — 99284 EMERGENCY DEPT VISIT MOD MDM: CPT

## 2023-08-25 PROCEDURE — 84484 ASSAY OF TROPONIN QUANT: CPT

## 2023-08-25 PROCEDURE — 36415 COLL VENOUS BLD VENIPUNCTURE: CPT

## 2023-08-25 PROCEDURE — 85610 PROTHROMBIN TIME: CPT

## 2023-08-25 PROCEDURE — 70450 CT HEAD/BRAIN W/O DYE: CPT

## 2023-08-25 PROCEDURE — 83735 ASSAY OF MAGNESIUM: CPT

## 2023-08-25 RX ORDER — BENZTROPINE MESYLATE 0.5 MG/1
0.5 TABLET ORAL 2 TIMES DAILY
Qty: 60 TABLET | Refills: 1 | Status: SHIPPED | OUTPATIENT
Start: 2023-08-25

## 2023-08-25 ASSESSMENT — PAIN - FUNCTIONAL ASSESSMENT
PAIN_FUNCTIONAL_ASSESSMENT: NONE - DENIES PAIN
PAIN_FUNCTIONAL_ASSESSMENT: NONE - DENIES PAIN

## 2023-08-25 ASSESSMENT — LIFESTYLE VARIABLES
HOW MANY STANDARD DRINKS CONTAINING ALCOHOL DO YOU HAVE ON A TYPICAL DAY: PATIENT DOES NOT DRINK
HOW OFTEN DO YOU HAVE A DRINK CONTAINING ALCOHOL: NEVER

## 2023-08-25 NOTE — DISCHARGE INSTRUCTIONS
Thank you! Thank you for allowing me to care for you in the emergency department. It is my goal to provide you with excellent care. If you have not received excellent quality care, please ask to speak to the nurse manager. Please fill out the survey that will come to you by mail or email since we listen to your feedback! Below you will find a list of your tests from today's visit. Should you have any questions, please do not hesitate to call the emergency department.     Labs  Recent Results (from the past 12 hour(s))   EKG 12 Lead    Collection Time: 08/25/23 11:40 AM   Result Value Ref Range    Ventricular Rate 80 BPM    Atrial Rate 80 BPM    P-R Interval 186 ms    QRS Duration 88 ms    Q-T Interval 404 ms    QTc Calculation (Bazett) 465 ms    P Axis 74 degrees    R Axis -6 degrees    T Axis 33 degrees    Diagnosis       Normal sinus rhythm  Possible Left atrial enlargement  Left ventricular hypertrophy  Abnormal ECG    Confirmed by ANNIE MELENDEZ, Nickola Hodgkins ([id number]) on 8/25/2023 12:32:28 PM     CBC with Auto Differential    Collection Time: 08/25/23 12:04 PM   Result Value Ref Range    WBC 9.7 4.1 - 11.1 K/uL    RBC 4.62 4.10 - 5.70 M/uL    Hemoglobin 13.7 12.1 - 17.0 g/dL    Hematocrit 41.4 36.6 - 50.3 %    MCV 89.6 80.0 - 99.0 FL    MCH 29.7 26.0 - 34.0 PG    MCHC 33.1 30.0 - 36.5 g/dL    RDW 12.5 11.5 - 14.5 %    Platelets 034 312 - 047 K/uL    MPV 10.2 8.9 - 12.9 FL    Nucleated RBCs 0.0 0.0  WBC    nRBC 0.00 0.00 - 0.01 K/uL    Neutrophils % 79 (H) 32 - 75 %    Lymphocytes % 10 (L) 12 - 49 %    Monocytes % 9 5 - 13 %    Eosinophils % 2 0 - 7 %    Basophils % 0 0 - 1 %    Immature Granulocytes 0 0 - 0.5 %    Neutrophils Absolute 7.6 1.8 - 8.0 K/UL    Lymphocytes Absolute 0.9 0.8 - 3.5 K/UL    Monocytes Absolute 0.8 0.0 - 1.0 K/UL    Eosinophils Absolute 0.2 0.0 - 0.4 K/UL    Basophils Absolute 0.0 0.0 - 0.1 K/UL    Absolute Immature Granulocyte 0.0 0.00 - 0.04 K/UL    Differential Type AUTOMATED     CMP

## 2023-08-25 NOTE — TELEPHONE ENCOUNTER
Patients brother called and would like the name of the specialist for Parkinson Disease so he can set up an appointment. Please call back at [phone].

## 2023-08-25 NOTE — TELEPHONE ENCOUNTER
TEL ENC also created based on this request. Spoke w/ brother about concerns. Please refer to [facility] for documentation.

## 2023-08-25 NOTE — TELEPHONE ENCOUNTER
From: [facility]  To: Dr. Grupo Holder: 8/25/2023 9:05 AM EDT  Subject: Request doctor for parkinson's symptoms    Caleb Spencer Cera,    I am writing on behalf of my brother, Keara Wilson. I think my brother has early signs of parkinson. He has tremor in both hands, probably his feet also, drooling, swallowing problem, restlessness. Please recommend the name of specialist doctor for me to call or you can have one of nurse setup appointment for my brother.     Thank you very much,  Margarita Stewart  [phone]

## 2023-08-25 NOTE — ED PROVIDER NOTES
[facility] EMERGENCY DEPT  EMERGENCY DEPARTMENT HISTORY AND PHYSICAL EXAM      Date: 8/25/2023  Patient Name: Alfa Hines  MRN: [id number]  [address] [facility] 1965  Date of evaluation: 8/25/2023  Provider: Ricky Stoner MD   Note Started: 2:35 PM EDT 8/25/23    HISTORY OF PRESENT ILLNESS     Chief Complaint   Patient presents with    Extremity Weakness    Drooling       History Provided By: Patient    HPI: Alfa Hines is a 59 y.o. male with a history of schizophrenia. Multiple medications adjusted recently. Now having flat stare drooling and grossly weak. Went to the PCP today with his family they referred him to the ER as a stroke work-up although symptoms have been ongoing for months. No falls or trauma. PAST MEDICAL HISTORY   Past Medical History:  Past Medical History:   Diagnosis Date    Anxiety     Depression     Intellectual disability     Schizoaffective disorder, bipolar type ([address]     Schizophrenia ([address])        Past Surgical History:  History reviewed. No pertinent surgical history. Family History:  Family History   Problem Relation Age of Onset    Dementia Mother     Pancreatic Cancer Father        Social History:  Social History     Tobacco Use    Smoking status: Never    Smokeless tobacco: Never   Substance Use Topics    Alcohol use: Never    Drug use: Never       Allergies:  No Known Allergies    PCP: Alen Landeros DO    Current Meds:   No current facility-administered medications for this encounter.      Current Outpatient Medications   Medication Sig Dispense Refill    benztropine (COGENTIN) 0.5 MG tablet Take 1 tablet by mouth 2 times daily 60 tablet 1    ipratropium (ATROVENT) 0.03 % nasal spray 1 spray by Each Nostril route 2 times daily as needed for Rhinitis 30 mL 3    house dust mite allergen extract (ODACTRA) 12 SQ-HDM SUBL sublingual tablet Take 1 tablet by mouth daily 90 tablet 1    FLUoxetine (PROZAC) 20 MG capsule Take 1 capsule by mouth daily      FLUoxetine (PROZAC) 40 MG capsule Take

## 2023-08-25 NOTE — TELEPHONE ENCOUNTER
[employer] message also sent from patient regarding this info being requested. Ca Bangura states that he thinks pt is showing early signs of Parkinson. Explains he is showing tremor in both hands, feet, and drooling. Also states that pt is having issues swallowing and experiencing restlessness. Called brother to get more information. States tremors have started a couple of months ago. Inquired about any recent weakness, and he states that pt is complaining of weakness in both legs. Inquired about any facial drooping, and brother asked pt to smile. Brother states he was unsure of any drooping, but states his smile seemed \"tilted\"- Was unable to notify me of how long this has been present. Inquired w/ LPN based on symptoms that were reported, and she recommends pt go to ER in order to get evaluated. Notified brother and he verbalized understanding.  States he will go to [facility] ED

## 2023-08-25 NOTE — ED TRIAGE NOTES
States left arm weakness and tremors for new month, states drooling as well for few months, wants to r/o CVA vs Parkinsons

## 2023-10-17 ENCOUNTER — OFFICE VISIT (OUTPATIENT)
Age: 58
End: 2023-10-17
Payer: MEDICARE

## 2023-10-17 VITALS
SYSTOLIC BLOOD PRESSURE: 128 MMHG | DIASTOLIC BLOOD PRESSURE: 82 MMHG | HEIGHT: 61 IN | BODY MASS INDEX: 27.38 KG/M2 | WEIGHT: 145 LBS | HEART RATE: 70 BPM | OXYGEN SATURATION: 97 %

## 2023-10-17 DIAGNOSIS — J30.89 OTHER ALLERGIC RHINITIS: ICD-10-CM

## 2023-10-17 DIAGNOSIS — J31.0 CHRONIC RHINITIS: Primary | ICD-10-CM

## 2023-10-17 PROCEDURE — G8427 DOCREV CUR MEDS BY ELIG CLIN: HCPCS | Performed by: OTOLARYNGOLOGY

## 2023-10-17 PROCEDURE — G8419 CALC BMI OUT NRM PARAM NOF/U: HCPCS | Performed by: OTOLARYNGOLOGY

## 2023-10-17 PROCEDURE — 1036F TOBACCO NON-USER: CPT | Performed by: OTOLARYNGOLOGY

## 2023-10-17 PROCEDURE — 99213 OFFICE O/P EST LOW 20 MIN: CPT | Performed by: OTOLARYNGOLOGY

## 2023-10-17 PROCEDURE — G8484 FLU IMMUNIZE NO ADMIN: HCPCS | Performed by: OTOLARYNGOLOGY

## 2023-10-17 PROCEDURE — 3017F COLORECTAL CA SCREEN DOC REV: CPT | Performed by: OTOLARYNGOLOGY

## 2023-11-09 ENCOUNTER — OFFICE VISIT (OUTPATIENT)
Facility: CLINIC | Age: 58
End: 2023-11-09
Payer: MEDICARE

## 2023-11-09 VITALS
WEIGHT: 151 LBS | DIASTOLIC BLOOD PRESSURE: 73 MMHG | OXYGEN SATURATION: 99 % | SYSTOLIC BLOOD PRESSURE: 126 MMHG | HEIGHT: 61 IN | BODY MASS INDEX: 28.51 KG/M2 | TEMPERATURE: 98.6 F | RESPIRATION RATE: 18 BRPM | HEART RATE: 61 BPM

## 2023-11-09 DIAGNOSIS — E27.8 ADRENAL INCIDENTALOMA (HCC): ICD-10-CM

## 2023-11-09 DIAGNOSIS — Z00.00 MEDICARE ANNUAL WELLNESS VISIT, SUBSEQUENT: Primary | ICD-10-CM

## 2023-11-09 DIAGNOSIS — R73.03 PREDIABETES: ICD-10-CM

## 2023-11-09 DIAGNOSIS — Z13.220 SCREENING CHOLESTEROL LEVEL: ICD-10-CM

## 2023-11-09 DIAGNOSIS — Z00.00 MEDICARE ANNUAL WELLNESS VISIT, SUBSEQUENT: ICD-10-CM

## 2023-11-09 DIAGNOSIS — R93.89 ABNORMAL FINDINGS ON DIAGNOSTIC IMAGING OF OTHER SPECIFIED BODY STRUCTURES: ICD-10-CM

## 2023-11-09 DIAGNOSIS — F20.9 SCHIZOPHRENIA, UNSPECIFIED TYPE (HCC): ICD-10-CM

## 2023-11-09 DIAGNOSIS — F32.A DEPRESSION WITH SUICIDAL IDEATION: ICD-10-CM

## 2023-11-09 DIAGNOSIS — R45.851 DEPRESSION WITH SUICIDAL IDEATION: ICD-10-CM

## 2023-11-09 PROBLEM — E11.9 TYPE 2 DIABETES MELLITUS WITHOUT COMPLICATION (HCC): Status: RESOLVED | Noted: 2023-08-09 | Resolved: 2023-11-09

## 2023-11-09 PROCEDURE — 1036F TOBACCO NON-USER: CPT | Performed by: FAMILY MEDICINE

## 2023-11-09 PROCEDURE — G8427 DOCREV CUR MEDS BY ELIG CLIN: HCPCS | Performed by: FAMILY MEDICINE

## 2023-11-09 PROCEDURE — 99213 OFFICE O/P EST LOW 20 MIN: CPT | Performed by: FAMILY MEDICINE

## 2023-11-09 PROCEDURE — G8484 FLU IMMUNIZE NO ADMIN: HCPCS | Performed by: FAMILY MEDICINE

## 2023-11-09 PROCEDURE — G8419 CALC BMI OUT NRM PARAM NOF/U: HCPCS | Performed by: FAMILY MEDICINE

## 2023-11-09 PROCEDURE — 3017F COLORECTAL CA SCREEN DOC REV: CPT | Performed by: FAMILY MEDICINE

## 2023-11-09 PROCEDURE — G0439 PPPS, SUBSEQ VISIT: HCPCS | Performed by: FAMILY MEDICINE

## 2023-11-09 RX ORDER — RISPERIDONE 3 MG/1
3 TABLET ORAL DAILY
COMMUNITY

## 2023-11-09 ASSESSMENT — PATIENT HEALTH QUESTIONNAIRE - PHQ9
2. FEELING DOWN, DEPRESSED OR HOPELESS: 0
SUM OF ALL RESPONSES TO PHQ QUESTIONS 1-9: 0
5. POOR APPETITE OR OVEREATING: 0
SUM OF ALL RESPONSES TO PHQ QUESTIONS 1-9: 0
9. THOUGHTS THAT YOU WOULD BE BETTER OFF DEAD, OR OF HURTING YOURSELF: 0
SUM OF ALL RESPONSES TO PHQ QUESTIONS 1-9: 0
SUM OF ALL RESPONSES TO PHQ QUESTIONS 1-9: 0
3. TROUBLE FALLING OR STAYING ASLEEP: 0
1. LITTLE INTEREST OR PLEASURE IN DOING THINGS: 0
7. TROUBLE CONCENTRATING ON THINGS, SUCH AS READING THE NEWSPAPER OR WATCHING TELEVISION: 0
SUM OF ALL RESPONSES TO PHQ9 QUESTIONS 1 & 2: 0
8. MOVING OR SPEAKING SO SLOWLY THAT OTHER PEOPLE COULD HAVE NOTICED. OR THE OPPOSITE, BEING SO FIGETY OR RESTLESS THAT YOU HAVE BEEN MOVING AROUND A LOT MORE THAN USUAL: 0
4. FEELING TIRED OR HAVING LITTLE ENERGY: 0
SUM OF ALL RESPONSES TO PHQ9 QUESTIONS 1 & 2: 0
1. LITTLE INTEREST OR PLEASURE IN DOING THINGS: 0
SUM OF ALL RESPONSES TO PHQ QUESTIONS 1-9: 0
10. IF YOU CHECKED OFF ANY PROBLEMS, HOW DIFFICULT HAVE THESE PROBLEMS MADE IT FOR YOU TO DO YOUR WORK, TAKE CARE OF THINGS AT HOME, OR GET ALONG WITH OTHER PEOPLE: 0
SUM OF ALL RESPONSES TO PHQ QUESTIONS 1-9: 0
6. FEELING BAD ABOUT YOURSELF - OR THAT YOU ARE A FAILURE OR HAVE LET YOURSELF OR YOUR FAMILY DOWN: 0
2. FEELING DOWN, DEPRESSED OR HOPELESS: 0

## 2023-11-09 ASSESSMENT — COLUMBIA-SUICIDE SEVERITY RATING SCALE - C-SSRS
7. DID THIS OCCUR IN THE LAST THREE MONTHS: NO
4. HAVE YOU HAD THESE THOUGHTS AND HAD SOME INTENTION OF ACTING ON THEM?: NO
3. HAVE YOU BEEN THINKING ABOUT HOW YOU MIGHT KILL YOURSELF?: NO
5. HAVE YOU STARTED TO WORK OUT OR WORKED OUT THE DETAILS OF HOW TO KILL YOURSELF? DO YOU INTEND TO CARRY OUT THIS PLAN?: NO

## 2023-11-09 NOTE — PROGRESS NOTES
Medicare Annual Wellness Visit    Keara Wilson is here for Medicare AWV    Assessment & Plan   Medicare annual wellness visit, subsequent  -     Comprehensive Metabolic Panel; Future  -     Lipid Panel; Future  -     CBC; Future  Chart reviewed and updated as needed. Care gaps discussed. Brother and patient defer colon cancer screening. Annual labs ordered and pending. Screening cholesterol level  -     Lipid Panel; Future    Prediabetes  Last A1c 5.5 in 08/2023. Adrenal incidentaloma ([address])  Discovered in 10/2022. Will order surveillance CT abdomen/pelvis. Schizophrenia, unspecified type ([address])  Depression with suicidal ideation  Managed by Psychiatry. Continue temazepam, risperidone, paliperidone, fluoxetine, and benztropine as prescribed. Recommendations for Preventive Services Due: see orders and patient instructions/AVS.  Recommended screening schedule for the next 5-10 years is provided to the patient in written form: see Patient Instructions/AVS.     Return in about 6 months (around 5/9/2024) for Chronic problems ([facility]). Subjective   The following acute and/or chronic problems were also addressed today. Brother provides all of history. Diet: Eating normal now but it has to be soft while waiting on dentures. Eating rice with peanut soup and ground beef. He doesn't like vegetables. Drinks coffee in am, orange juice. Drinks 4 cans of diet Pepsi. Gets water through ice in his drinks. Exercise: Exercises 4 times per day on treadmill and bicycle. Lives with brother who is main caregiver. Schizophrenia/depression: Patient is seeing new Psychiatry in [facility]). Cannot recall the name of the provider. No longer seeing Dr. Hemalatha Robertson. Added benztropine 1/2mg BID in the ED which patient continues to take. Also taking risperidone, temazepam, lithium, fluoxetine, and paliperidone.      Adrenal incidentaloma: Seen on CT abdomen/pelvis in 10/2022 which was ordered for

## 2023-11-15 LAB
ALBUMIN SERPL-MCNC: 4.1 G/DL (ref 3.8–4.9)
ALBUMIN/GLOB SERPL: 1.9 {RATIO} (ref 1.2–2.2)
ALP SERPL-CCNC: 77 IU/L (ref 44–121)
ALT SERPL-CCNC: 14 IU/L (ref 0–44)
AST SERPL-CCNC: 18 IU/L (ref 0–40)
BILIRUB SERPL-MCNC: 0.4 MG/DL (ref 0–1.2)
BUN SERPL-MCNC: 10 MG/DL (ref 8–27)
BUN/CREAT SERPL: 9 (ref 10–24)
CALCIUM SERPL-MCNC: 9 MG/DL (ref 8.6–10.2)
CHLORIDE SERPL-SCNC: 108 MMOL/L (ref 96–106)
CHOLEST SERPL-MCNC: 259 MG/DL (ref 100–199)
CO2 SERPL-SCNC: 21 MMOL/L (ref 20–29)
CREAT SERPL-MCNC: 1.06 MG/DL (ref 0.76–1.27)
EGFRCR SERPLBLD CKD-EPI 2021: 80 ML/MIN/1.73
ERYTHROCYTE [DISTWIDTH] IN BLOOD BY AUTOMATED COUNT: 13.1 % (ref 11.6–15.4)
GLOBULIN SER CALC-MCNC: 2.2 G/DL (ref 1.5–4.5)
GLUCOSE SERPL-MCNC: 110 MG/DL (ref 70–99)
HCT VFR BLD AUTO: 40.2 % (ref 37.5–51)
HDLC SERPL-MCNC: 44 MG/DL
HGB BLD-MCNC: 13.4 G/DL (ref 13–17.7)
LDLC SERPL CALC-MCNC: 181 MG/DL (ref 0–99)
MCH RBC QN AUTO: 29.5 PG (ref 26.6–33)
MCHC RBC AUTO-ENTMCNC: 33.3 G/DL (ref 31.5–35.7)
MCV RBC AUTO: 89 FL (ref 79–97)
PLATELET # BLD AUTO: 206 X10E3/UL (ref 150–450)
POTASSIUM SERPL-SCNC: 4.1 MMOL/L (ref 3.5–5.2)
PROT SERPL-MCNC: 6.3 G/DL (ref 6–8.5)
RBC # BLD AUTO: 4.54 X10E6/UL (ref 4.14–5.8)
SODIUM SERPL-SCNC: 144 MMOL/L (ref 134–144)
TRIGL SERPL-MCNC: 184 MG/DL (ref 0–149)
VLDLC SERPL CALC-MCNC: 34 MG/DL (ref 5–40)
WBC # BLD AUTO: 5.8 X10E3/UL (ref 3.4–10.8)

## 2023-11-20 PROBLEM — M79.603 PAIN IN UPPER LIMB: Status: RESOLVED | Noted: 2023-08-09 | Resolved: 2023-11-20

## 2023-11-20 PROBLEM — R56.9 SEIZURE (HCC): Status: RESOLVED | Noted: 2022-10-24 | Resolved: 2023-11-20

## 2023-11-20 PROBLEM — R41.3 MEMORY IMPAIRMENT: Status: RESOLVED | Noted: 2020-07-31 | Resolved: 2023-11-20

## 2023-11-20 PROBLEM — R63.1 EXCESSIVE THIRST: Status: RESOLVED | Noted: 2023-08-09 | Resolved: 2023-11-20

## 2023-11-20 PROBLEM — R25.1 TREMOR: Status: RESOLVED | Noted: 2020-07-31 | Resolved: 2023-11-20

## 2023-11-20 PROBLEM — F25.9 SCHIZOAFFECTIVE SCHIZOPHRENIA (HCC): Status: RESOLVED | Noted: 2023-08-09 | Resolved: 2023-11-20

## 2023-11-29 ENCOUNTER — HOSPITAL ENCOUNTER (OUTPATIENT)
Facility: HOSPITAL | Age: 58
Discharge: HOME | End: 2023-12-02
Attending: FAMILY MEDICINE
Payer: MEDICARE

## 2023-11-29 DIAGNOSIS — R93.89 ABNORMAL FINDINGS ON DIAGNOSTIC IMAGING OF OTHER SPECIFIED BODY STRUCTURES: ICD-10-CM

## 2023-11-29 DIAGNOSIS — E27.8 ADRENAL INCIDENTALOMA (HCC): ICD-10-CM

## 2023-11-29 PROCEDURE — 6360000004 HC RX CONTRAST MEDICATION: Performed by: FAMILY MEDICINE

## 2023-11-29 PROCEDURE — 74170 CT ABD WO CNTRST FLWD CNTRST: CPT

## 2023-11-29 RX ADMIN — IOPAMIDOL 100 ML: 755 INJECTION, SOLUTION INTRAVENOUS at 14:44

## 2024-01-17 ENCOUNTER — PATIENT MESSAGE (OUTPATIENT)
Facility: CLINIC | Age: 59
End: 2024-01-17

## 2024-01-17 NOTE — TELEPHONE ENCOUNTER
From: Keara Wilson  To: Dr. Debbie Naqvi  Sent: 1/17/2024 3:50 PM EST  Subject: atorvastatin    Hello,  On Jan 12, you email this to him \"I've just sent atorvastatin to your pharmacy. Let us know if you have trouble filling the prescription!\"   Debbie Naqvi, DO    It looks [facility] (Located in [place], Store ID: [id number]) did not receive the prescription. Please send it again. I am reply email for my brother (Keara Steve).     Thank you,  Berhane Wilson

## 2024-01-17 NOTE — TELEPHONE ENCOUNTER
Requested Prescriptions     Pending Prescriptions Disp Refills    atorvastatin (LIPITOR) 20 MG tablet 90 tablet 3     Sig: Take 1 tablet by mouth daily         Medication did not process through to the pharmacy when sent on 1/12/24

## 2024-01-18 RX ORDER — ATORVASTATIN CALCIUM 20 MG/1
20 TABLET, FILM COATED ORAL DAILY
Qty: 90 TABLET | Refills: 3 | Status: SHIPPED | OUTPATIENT
Start: 2024-01-18

## 2024-06-11 ENCOUNTER — OFFICE VISIT (OUTPATIENT)
Facility: CLINIC | Age: 59
End: 2024-06-11
Payer: MEDICARE

## 2024-06-11 VITALS
BODY MASS INDEX: 30.02 KG/M2 | RESPIRATION RATE: 18 BRPM | WEIGHT: 159 LBS | DIASTOLIC BLOOD PRESSURE: 78 MMHG | HEART RATE: 70 BPM | TEMPERATURE: 95.9 F | SYSTOLIC BLOOD PRESSURE: 124 MMHG | HEIGHT: 61 IN | OXYGEN SATURATION: 98 %

## 2024-06-11 DIAGNOSIS — I51.7 CARDIOMEGALY: ICD-10-CM

## 2024-06-11 DIAGNOSIS — E27.8 ADRENAL INCIDENTALOMA (HCC): ICD-10-CM

## 2024-06-11 DIAGNOSIS — E78.00 PURE HYPERCHOLESTEROLEMIA: ICD-10-CM

## 2024-06-11 DIAGNOSIS — F20.9 SCHIZOPHRENIA, UNSPECIFIED TYPE (HCC): Primary | ICD-10-CM

## 2024-06-11 DIAGNOSIS — G83.20 MONOPARESIS OF ARM (HCC): ICD-10-CM

## 2024-06-11 PROCEDURE — 99214 OFFICE O/P EST MOD 30 MIN: CPT | Performed by: FAMILY MEDICINE

## 2024-06-11 PROCEDURE — G8417 CALC BMI ABV UP PARAM F/U: HCPCS | Performed by: FAMILY MEDICINE

## 2024-06-11 PROCEDURE — G8427 DOCREV CUR MEDS BY ELIG CLIN: HCPCS | Performed by: FAMILY MEDICINE

## 2024-06-11 PROCEDURE — 3017F COLORECTAL CA SCREEN DOC REV: CPT | Performed by: FAMILY MEDICINE

## 2024-06-11 PROCEDURE — 1036F TOBACCO NON-USER: CPT | Performed by: FAMILY MEDICINE

## 2024-06-11 ASSESSMENT — PATIENT HEALTH QUESTIONNAIRE - PHQ9: DEPRESSION UNABLE TO ASSESS: PT REFUSES

## 2024-06-11 NOTE — PROGRESS NOTES
\"Have you been to the ER, urgent care clinic since your last visit?  Hospitalized since your last visit?\"    NO    “Have you seen or consulted any other health care providers outside of [facility] since your last visit?”    NO        “Have you had a colorectal cancer screening such as a colonoscopy/FIT/Cologuard?    NO    Date of last Colonoscopy: 5/21/2009  No cologuard on file  No FIT/FOBT on file   No flexible sigmoidoscopy on file     Chief Complaint   Patient presents with    Follow-up     /78 (Site: Left Upper Arm, Position: Sitting, Cuff Size: Large Adult)   Pulse 70   Temp (!) 95.9 °F (35.5 °C) (Temporal)   Resp 18   Ht 1.549 m (5' 1\")   Wt 72.1 kg (159 lb)   SpO2 98%   BMI 30.04 kg/m²       Click Here for Release of Records Request

## 2024-06-11 NOTE — PROGRESS NOTES
Subjective  Chief Complaint   Patient presents with    Follow-up     HPI:  Keaar Wilson is a 58 y.o. male with medical problems as listed below who presents for follow up of chronic conditions. He is accompanied by his brother/caregiver.     Cardiomegaly: Incidental finding on CT abdomen/pelvis. Denies any orthopnea, lower extremity swelling, dyspnea on exertion. Rides his stationary bike without any problems according to brother.     HLD: Recall, patient started on atorvastatin after his most recent blood work in 11/2023. Brother endorses compliance.     Schizophrenia: Brother reports that his psychiatrist tried to wean patient off of risperidone, but after 3 weeks, there were behavioral disturbances. So, overall, psychiatric medications have not changed. Patient is due to check lithium level in the near future per Psychiatry.     Patient Active Problem List   Diagnosis    Schizophrenia (HCC)    Gait disturbance    Depression with suicidal ideation    Vitamin D deficiency    Prediabetes    Adrenal incidentaloma (HCC)    Poor dentition    Monoparesis of arm (HCC)     Family History   Problem Relation Age of Onset    Dementia Mother     High Cholesterol Mother     Osteoporosis Mother     Vision Loss Mother     Pancreatic Cancer Father     Cancer Father       Social History     Tobacco Use    Smoking status: Never    Smokeless tobacco: Never   Substance Use Topics    Alcohol use: Never    Drug use: Never     Current Outpatient Medications on File Prior to Visit   Medication Sig Dispense Refill    atorvastatin (LIPITOR) 20 MG tablet Take 1 tablet by mouth daily 90 tablet 3    risperiDONE (RISPERDAL) 3 MG tablet Take 1 tablet by mouth daily      benztropine (COGENTIN) 0.5 MG tablet Take 1 tablet by mouth 2 times daily 60 tablet 1    FLUoxetine (PROZAC) 20 MG capsule Take 1 capsule by mouth daily      FLUoxetine (PROZAC) 40 MG capsule Take 60 mg by mouth daily      lithium 300 MG capsule Take 1 capsule by mouth 2 times

## 2024-07-01 ENCOUNTER — HOSPITAL ENCOUNTER (OUTPATIENT)
Facility: HOSPITAL | Age: 59
Discharge: HOME | End: 2024-07-03
Attending: FAMILY MEDICINE
Payer: MEDICARE

## 2024-07-01 DIAGNOSIS — I51.7 CARDIOMEGALY: ICD-10-CM

## 2024-07-01 LAB
ECHO AO ASC DIAM: 3.3 CM
ECHO AO ROOT DIAM: 3.3 CM
ECHO AV AREA PEAK VELOCITY: 3.9 CM2
ECHO AV AREA VTI: 4 CM2
ECHO AV MEAN GRADIENT: 4 MMHG
ECHO AV MEAN VELOCITY: 0.9 M/S
ECHO AV PEAK GRADIENT: 8 MMHG
ECHO AV PEAK VELOCITY: 1.4 M/S
ECHO AV VELOCITY RATIO: 0.93
ECHO AV VTI: 29.1 CM
ECHO EST RA PRESSURE: 3 MMHG
ECHO HV DIASTOLIC PEAK VELOCITY: 54 CM/S
ECHO HV SYSTOLIC TO DIASTOLIC VELOCITY RATIO: 0.4 NO UNITS
ECHO HV SYTOLIC PEAK VELOCITY: 20 CM/S
ECHO LA AREA 2C: 9.9 CM2
ECHO LA AREA 4C: 16.6 CM2
ECHO LA DIAMETER: 4.3 CM
ECHO LA MAJOR AXIS: 4.6 CM
ECHO LA MINOR AXIS: 4 CM
ECHO LA TO AORTIC ROOT RATIO: 1.3
ECHO LA VOL BP: 33 ML (ref 18–58)
ECHO LA VOL MOD A2C: 20 ML (ref 18–58)
ECHO LA VOL MOD A4C: 48 ML (ref 18–58)
ECHO LV E' LATERAL VELOCITY: 10 CM/S
ECHO LV E' SEPTAL VELOCITY: 11 CM/S
ECHO LV EDV A2C: 69 ML
ECHO LV EDV A4C: 83 ML
ECHO LV EJECTION FRACTION A2C: 70 %
ECHO LV EJECTION FRACTION A4C: 46 %
ECHO LV EJECTION FRACTION BIPLANE: 58 % (ref 55–100)
ECHO LV ESV A2C: 21 ML
ECHO LV ESV A4C: 45 ML
ECHO LV FRACTIONAL SHORTENING: 36 % (ref 28–44)
ECHO LV INTERNAL DIMENSION DIASTOLIC: 4.7 CM (ref 4.2–5.9)
ECHO LV INTERNAL DIMENSION SYSTOLIC: 3 CM
ECHO LV IVSD: 1 CM (ref 0.6–1)
ECHO LV MASS 2D: 187.5 G (ref 88–224)
ECHO LV POSTERIOR WALL DIASTOLIC: 1.2 CM (ref 0.6–1)
ECHO LV RELATIVE WALL THICKNESS RATIO: 0.51
ECHO LVOT AREA: 4.2 CM2
ECHO LVOT AV VTI INDEX: 0.95
ECHO LVOT DIAM: 2.3 CM
ECHO LVOT MEAN GRADIENT: 3 MMHG
ECHO LVOT PEAK GRADIENT: 7 MMHG
ECHO LVOT PEAK VELOCITY: 1.3 M/S
ECHO LVOT SV: 115 ML
ECHO LVOT VTI: 27.7 CM
ECHO MV A VELOCITY: 0.82 M/S
ECHO MV E VELOCITY: 0.7 M/S
ECHO MV E/A RATIO: 0.85
ECHO MV E/E' LATERAL: 7
ECHO MV E/E' RATIO (AVERAGED): 6.68
ECHO MV E/E' SEPTAL: 6.36
ECHO MV REGURGITANT PEAK GRADIENT: 3 MMHG
ECHO MV REGURGITANT PEAK VELOCITY: 0.9 M/S
ECHO MV REGURGITANT VTIA: 22.6 CM
ECHO PULMONARY ARTERY END DIASTOLIC PRESSURE: 4 MMHG
ECHO PV MAX VELOCITY: 0.7 M/S
ECHO PV MEAN GRADIENT: 1 MMHG
ECHO PV MEAN VELOCITY: 0.5 M/S
ECHO PV PEAK GRADIENT: 2 MMHG
ECHO PV REGURGITANT MAX VELOCITY: 1 M/S
ECHO PV VTI: 15.8 CM
ECHO RA AREA 4C: 10.5 CM2
ECHO RA VOLUME: 29 ML
ECHO RIGHT VENTRICULAR SYSTOLIC PRESSURE (RVSP): 11 MMHG
ECHO RV BASAL DIMENSION: 3.6 CM
ECHO RV FREE WALL PEAK S': 15 CM/S
ECHO RV MID DIMENSION: 3.4 CM
ECHO RV TAPSE: 2.4 CM (ref 1.7–?)
ECHO TV REGURGITANT MAX VELOCITY: 1.41 M/S
ECHO TV REGURGITANT PEAK GRADIENT: 8 MMHG
Lab: 32 CM/S

## 2024-07-01 PROCEDURE — 93306 TTE W/DOPPLER COMPLETE: CPT

## 2024-12-03 ENCOUNTER — OFFICE VISIT (OUTPATIENT)
Facility: CLINIC | Age: 59
End: 2024-12-03
Payer: MEDICARE

## 2024-12-03 VITALS
RESPIRATION RATE: 18 BRPM | DIASTOLIC BLOOD PRESSURE: 73 MMHG | HEART RATE: 53 BPM | OXYGEN SATURATION: 96 % | WEIGHT: 157 LBS | TEMPERATURE: 97.8 F | SYSTOLIC BLOOD PRESSURE: 128 MMHG | HEIGHT: 61 IN | BODY MASS INDEX: 29.64 KG/M2

## 2024-12-03 DIAGNOSIS — Z00.00 MEDICARE ANNUAL WELLNESS VISIT, SUBSEQUENT: Primary | ICD-10-CM

## 2024-12-03 DIAGNOSIS — E55.9 VITAMIN D DEFICIENCY: ICD-10-CM

## 2024-12-03 DIAGNOSIS — E78.00 PURE HYPERCHOLESTEROLEMIA: ICD-10-CM

## 2024-12-03 DIAGNOSIS — F20.9 SCHIZOPHRENIA, UNSPECIFIED TYPE (HCC): ICD-10-CM

## 2024-12-03 DIAGNOSIS — R73.03 PREDIABETES: ICD-10-CM

## 2024-12-03 DIAGNOSIS — Z53.20 COLON CANCER SCREENING DECLINED: ICD-10-CM

## 2024-12-03 PROCEDURE — G8484 FLU IMMUNIZE NO ADMIN: HCPCS | Performed by: FAMILY MEDICINE

## 2024-12-03 PROCEDURE — G0439 PPPS, SUBSEQ VISIT: HCPCS | Performed by: FAMILY MEDICINE

## 2024-12-03 PROCEDURE — 3017F COLORECTAL CA SCREEN DOC REV: CPT | Performed by: FAMILY MEDICINE

## 2024-12-03 SDOH — ECONOMIC STABILITY: FOOD INSECURITY: WITHIN THE PAST 12 MONTHS, THE FOOD YOU BOUGHT JUST DIDN'T LAST AND YOU DIDN'T HAVE MONEY TO GET MORE.: NEVER TRUE

## 2024-12-03 SDOH — ECONOMIC STABILITY: FOOD INSECURITY: WITHIN THE PAST 12 MONTHS, YOU WORRIED THAT YOUR FOOD WOULD RUN OUT BEFORE YOU GOT MONEY TO BUY MORE.: NEVER TRUE

## 2024-12-03 SDOH — ECONOMIC STABILITY: INCOME INSECURITY: HOW HARD IS IT FOR YOU TO PAY FOR THE VERY BASICS LIKE FOOD, HOUSING, MEDICAL CARE, AND HEATING?: NOT HARD AT ALL

## 2024-12-03 ASSESSMENT — PATIENT HEALTH QUESTIONNAIRE - PHQ9
SUM OF ALL RESPONSES TO PHQ QUESTIONS 1-9: 0
1. LITTLE INTEREST OR PLEASURE IN DOING THINGS: NOT AT ALL
2. FEELING DOWN, DEPRESSED OR HOPELESS: NOT AT ALL
SUM OF ALL RESPONSES TO PHQ9 QUESTIONS 1 & 2: 0
SUM OF ALL RESPONSES TO PHQ QUESTIONS 1-9: 0

## 2024-12-03 NOTE — PROGRESS NOTES
\"Have you been to the ER, urgent care clinic since your last visit?  Hospitalized since your last visit?\"    NO    “Have you seen or consulted any other health care providers outside our system since your last visit?”    NO      “Have you had a colorectal cancer screening such as a colonoscopy/FIT/Cologuard?    NO    Date of last Colonoscopy: 5/21/2009  No cologuard on file  No FIT/FOBT on file   No flexible sigmoidoscopy on file          Chief Complaint   Patient presents with    Medicare AWV     /73 (Site: Left Upper Arm, Position: Sitting, Cuff Size: Large Adult)   Temp 97.8 °F (36.6 °C) (Temporal)   Resp 18   Ht 1.549 m (5' 1\")   Wt 71.2 kg (157 lb)   SpO2 100%   BMI 29.66 kg/m²

## 2024-12-03 NOTE — PROGRESS NOTES
Medicare Annual Wellness Visit    Keara Wilson is here for Medicare AWV  Accompanied by brother who is primary caregiver and provides history.     Assessment & Plan   Medicare annual wellness visit, subsequent  -     Comprehensive Metabolic Panel  -     CBC with Auto Differential  -     Lipid Panel  Chart reviewed and updated as needed. Care gaps discussed. Annual labs ordered.     Colon cancer screening declined    Vitamin D deficiency  -     Vitamin D 25 Hydroxy  Labs pending. Further recommendations to follow.     Prediabetes  -     Hemoglobin A1C  Last A1c 5.5. Consider deferring repeat A1c for 2 years. Has consistently been in low 5's for last 4 years.     Pure hypercholesterolemia  -     Lipid Panel  Labs pending. Continue atorvastatin 20mg daily for now.     Schizophrenia  Stable. Managed by Psychiatry. Brother inquiring about lithium level but thinks he might have lab order papers at home. Advised that this should be checked by his psychiatrist. Continue paliperidone, fluoxetine, lithium, and benztropine as prescribed.       Recommendations for Preventive Services Due: see orders and patient instructions/AVS.  Recommended screening schedule for the next 5-10 years is provided to the patient in written form: see Patient Instructions/AVS.     Return in about 1 year (around 12/3/2025) for Medicare wellness exam.     Subjective     Diet: Brother reports a well balanced, healthy diet  Exercise: Doing stationary bike daily.   Vaccines: Already had flu/COVID vaccines this fall.   Specialists: Psychiatry (in [place])  Colon cancer screening: Declines screening.     No updates except for a few medication changes per Psychiatry.     Patient's complete Health Risk Assessment and screening values have been reviewed and are found in Flowsheets. The following problems were reviewed today and where indicated follow up appointments were made and/or referrals ordered.    No Positive Risk Factors identified today.

## 2025-01-31 NOTE — TELEPHONE ENCOUNTER
Requested Prescriptions     Pending Prescriptions Disp Refills    atorvastatin (LIPITOR) 20 MG tablet [Pharmacy Med Name: ATORVASTATIN 20 MG TABLET] 90 tablet 3     Sig: TAKE 1 TABLET BY MOUTH EVERY DAY              Date of last OV:12/3/24  Future OV visit scheduled:  [] Yes -> Date:   [x] No    Last Refill: [] N/A  Date:1/18/24  Qty:90  # of refills:3    Med pending for provider review:  [x] Yes  [] No (provide reason why):     Requested Pharmacy updated in ENC:  [x] Yes    Applicable labs (provide date of completion):  [] Diabetic med- A1c:  [] Cholesterol med- Lipids:  [] BP med- CMP or BMP:   [] Thyroid med- TSH:  [] Gout med- Uric acid:  [] Prostate med- PSA:  [] Other (provide what type of med and lab):    Additional notes:

## 2025-02-04 RX ORDER — ATORVASTATIN CALCIUM 20 MG/1
20 TABLET, FILM COATED ORAL DAILY
Qty: 90 TABLET | Refills: 1 | Status: SHIPPED | OUTPATIENT
Start: 2025-02-04

## 2025-04-25 ENCOUNTER — COMMUNITY OUTREACH (OUTPATIENT)
Facility: CLINIC | Age: 60
End: 2025-04-25

## 2025-07-11 ENCOUNTER — OFFICE VISIT (OUTPATIENT)
Facility: CLINIC | Age: 60
End: 2025-07-11

## 2025-07-11 VITALS
BODY MASS INDEX: 24.8 KG/M2 | DIASTOLIC BLOOD PRESSURE: 94 MMHG | SYSTOLIC BLOOD PRESSURE: 138 MMHG | HEIGHT: 67 IN | OXYGEN SATURATION: 96 % | WEIGHT: 158 LBS | RESPIRATION RATE: 16 BRPM | TEMPERATURE: 98.3 F | HEART RATE: 76 BPM

## 2025-07-11 DIAGNOSIS — R60.1 GENERALIZED EDEMA: Primary | ICD-10-CM

## 2025-07-11 DIAGNOSIS — H43.813 PVD (POSTERIOR VITREOUS DETACHMENT), BILATERAL: ICD-10-CM

## 2025-07-11 RX ORDER — FUROSEMIDE 20 MG/1
20 TABLET ORAL DAILY
Qty: 90 TABLET | Refills: 1 | Status: SHIPPED | OUTPATIENT
Start: 2025-07-11

## 2025-07-11 SDOH — ECONOMIC STABILITY: FOOD INSECURITY: WITHIN THE PAST 12 MONTHS, THE FOOD YOU BOUGHT JUST DIDN'T LAST AND YOU DIDN'T HAVE MONEY TO GET MORE.: NEVER TRUE

## 2025-07-11 SDOH — ECONOMIC STABILITY: FOOD INSECURITY: WITHIN THE PAST 12 MONTHS, YOU WORRIED THAT YOUR FOOD WOULD RUN OUT BEFORE YOU GOT MONEY TO BUY MORE.: NEVER TRUE

## 2025-07-11 ASSESSMENT — PATIENT HEALTH QUESTIONNAIRE - PHQ9
7. TROUBLE CONCENTRATING ON THINGS, SUCH AS READING THE NEWSPAPER OR WATCHING TELEVISION: NOT AT ALL
2. FEELING DOWN, DEPRESSED OR HOPELESS: NOT AT ALL
8. MOVING OR SPEAKING SO SLOWLY THAT OTHER PEOPLE COULD HAVE NOTICED. OR THE OPPOSITE, BEING SO FIGETY OR RESTLESS THAT YOU HAVE BEEN MOVING AROUND A LOT MORE THAN USUAL: NOT AT ALL
5. POOR APPETITE OR OVEREATING: NOT AT ALL
9. THOUGHTS THAT YOU WOULD BE BETTER OFF DEAD, OR OF HURTING YOURSELF: NOT AT ALL
SUM OF ALL RESPONSES TO PHQ QUESTIONS 1-9: 0
3. TROUBLE FALLING OR STAYING ASLEEP: NOT AT ALL
SUM OF ALL RESPONSES TO PHQ QUESTIONS 1-9: 0
1. LITTLE INTEREST OR PLEASURE IN DOING THINGS: NOT AT ALL
SUM OF ALL RESPONSES TO PHQ QUESTIONS 1-9: 0
SUM OF ALL RESPONSES TO PHQ QUESTIONS 1-9: 0
6. FEELING BAD ABOUT YOURSELF - OR THAT YOU ARE A FAILURE OR HAVE LET YOURSELF OR YOUR FAMILY DOWN: NOT AT ALL
10. IF YOU CHECKED OFF ANY PROBLEMS, HOW DIFFICULT HAVE THESE PROBLEMS MADE IT FOR YOU TO DO YOUR WORK, TAKE CARE OF THINGS AT HOME, OR GET ALONG WITH OTHER PEOPLE: NOT DIFFICULT AT ALL
4. FEELING TIRED OR HAVING LITTLE ENERGY: NOT AT ALL

## 2025-07-11 NOTE — PROGRESS NOTES
[facility] MEDICINE  [address] [place] [address] [address]   [place] [address]  (P) [phone]   (F) [phone]    Subjective:   Keara Wilson is a 59 y.o. male  established here with complaints of Foot Swelling (B/L swelling ankle x 2 months.)   Reports has happened in the past, have reduced sodium intake but persistent.  No known leg injury.  No weakness per se.    Patient Active Problem List   Diagnosis    Schizophrenia ([facility])    Gait disturbance    Depression with suicidal ideation    Vitamin D deficiency    Prediabetes    Adrenal incidentaloma    Poor dentition    Monoparesis of arm ([facility])      Current Outpatient Medications   Medication Sig Dispense Refill    furosemide (LASIX) 20 MG tablet Take 1 tablet by mouth daily 90 tablet 1    atorvastatin (LIPITOR) 20 MG tablet TAKE 1 TABLET BY MOUTH EVERY DAY 90 tablet 1    benztropine (COGENTIN) 0.5 MG tablet Take 1 tablet by mouth 2 times daily 60 tablet 1    FLUoxetine (PROZAC) 20 MG capsule Take 1 capsule by mouth daily      FLUoxetine (PROZAC) 40 MG capsule Take 60 mg by mouth daily      lithium 300 MG capsule Take 1 capsule by mouth 2 times daily (with meals)      paliperidone (INVEGA) 3 MG extended release tablet Take 2 tablets by mouth       No current facility-administered medications for this visit.      No Known Allergies   Past Medical History:   Diagnosis Date    Anxiety     Depression     Intellectual disability     Schizoaffective disorder, bipolar type ([facility])     Schizophrenia ([facility])       History reviewed. No pertinent surgical history.   Family History   Problem Relation Age of Onset    Dementia Mother     High Cholesterol Mother     Osteoporosis Mother     Vision Loss Mother     Pancreatic Cancer Father     Cancer Father       Social History     Tobacco Use    Smoking status: Never    Smokeless tobacco: Never   Substance Use Topics    Alcohol use: Never        Review of Systems    Pertinent items are noted in HPI.     Objective:   BP (!)

## 2025-07-11 NOTE — PROGRESS NOTES
1. Have you been to the ER, urgent care clinic since your last visit?  Hospitalized since your last visit?No    2. Have you seen or consulted any other health care providers outside of the [facility] since your last visit?  Include any pap smears or colon screening. No    Chief Complaint   Patient presents with    Foot Swelling     B/L swelling ankle x 2 months.     Health Maintenance Due   Topic Date Due    DTaP/Tdap/Td vaccine (1 - Tdap) Never done    Pneumococcal 50+ years Vaccine (1 of 1 - PCV) Never done    Colorectal Cancer Screen  05/21/2019    A1C test (Diabetic or Prediabetic)  08/10/2024    COVID-19 Vaccine (5 - 2024-25 season) 09/01/2024    Lipids  11/14/2024     PHQ-9 Total Score: 0 (7/11/2025  9:11 AM)  Thoughts that you would be better off dead, or of hurting yourself in some way: 0 (7/11/2025  9:11 AM)        7/11/2025     9:00 AM   AMB Abuse Screening   Do you ever feel afraid of your partner? N   Are you in a relationship with someone who physically or mentally threatens you? N   Is it safe for you to go home? Y     Vitals:    07/11/25 0910   BP: (!) 141/102   Pulse: 76   Resp: 16   Temp: 98.3 °F (36.8 °C)   SpO2: 96%     Vitals:    07/11/25 0920   BP: (!) 138/94   Pulse:    Resp:    Temp:    SpO2: